# Patient Record
Sex: MALE | Race: WHITE | NOT HISPANIC OR LATINO
[De-identification: names, ages, dates, MRNs, and addresses within clinical notes are randomized per-mention and may not be internally consistent; named-entity substitution may affect disease eponyms.]

---

## 2017-07-18 ENCOUNTER — TRANSCRIPTION ENCOUNTER (OUTPATIENT)
Age: 62
End: 2017-07-18

## 2018-06-18 ENCOUNTER — APPOINTMENT (OUTPATIENT)
Dept: RADIATION ONCOLOGY | Facility: CLINIC | Age: 63
End: 2018-06-18
Payer: COMMERCIAL

## 2018-06-18 VITALS
WEIGHT: 156.19 LBS | HEIGHT: 67 IN | BODY MASS INDEX: 24.52 KG/M2 | HEART RATE: 57 BPM | TEMPERATURE: 98.2 F | DIASTOLIC BLOOD PRESSURE: 67 MMHG | RESPIRATION RATE: 16 BRPM | OXYGEN SATURATION: 97 % | SYSTOLIC BLOOD PRESSURE: 114 MMHG

## 2018-06-18 PROCEDURE — 99213 OFFICE O/P EST LOW 20 MIN: CPT

## 2020-02-09 ENCOUNTER — INPATIENT (INPATIENT)
Facility: HOSPITAL | Age: 65
LOS: 0 days | Discharge: HOME | DRG: 312 | End: 2020-02-10
Attending: HOSPITALIST | Admitting: FAMILY MEDICINE
Payer: MEDICARE

## 2020-02-09 VITALS — HEIGHT: 67 IN | WEIGHT: 154.98 LBS

## 2020-02-09 DIAGNOSIS — Z85.810 PERSONAL HISTORY OF MALIGNANT NEOPLASM OF TONGUE: ICD-10-CM

## 2020-02-09 DIAGNOSIS — R55 SYNCOPE AND COLLAPSE: ICD-10-CM

## 2020-02-09 DIAGNOSIS — Z87.891 PERSONAL HISTORY OF NICOTINE DEPENDENCE: ICD-10-CM
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
## 2020-02-09 DIAGNOSIS — S61.012A LACERATION WITHOUT FOREIGN BODY OF LEFT THUMB WITHOUT DAMAGE TO NAIL, INITIAL ENCOUNTER: ICD-10-CM

## 2020-02-09 DIAGNOSIS — R00.1 BRADYCARDIA, UNSPECIFIED: ICD-10-CM

## 2020-02-09 DIAGNOSIS — E78.5 HYPERLIPIDEMIA, UNSPECIFIED: ICD-10-CM
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
## 2020-02-09 DIAGNOSIS — R73.03 PREDIABETES: ICD-10-CM

## 2020-02-09 DIAGNOSIS — E03.9 HYPOTHYROIDISM, UNSPECIFIED: ICD-10-CM

## 2020-02-09 LAB
ALBUMIN SERPL ELPH-MCNC: 3.7 G/DL — SIGNIFICANT CHANGE UP (ref 3.3–5)
ALP SERPL-CCNC: 88 U/L — SIGNIFICANT CHANGE UP (ref 40–120)
ALT FLD-CCNC: 20 U/L — SIGNIFICANT CHANGE UP (ref 12–78)
ANION GAP SERPL CALC-SCNC: 4 MMOL/L — LOW (ref 5–17)
APTT BLD: 28.3 SEC — SIGNIFICANT CHANGE UP (ref 27.5–36.3)
AST SERPL-CCNC: 15 U/L — SIGNIFICANT CHANGE UP (ref 15–37)
BASOPHILS # BLD AUTO: 0.03 K/UL — SIGNIFICANT CHANGE UP (ref 0–0.2)
BASOPHILS NFR BLD AUTO: 0.4 % — SIGNIFICANT CHANGE UP (ref 0–2)
BILIRUB SERPL-MCNC: 0.3 MG/DL — SIGNIFICANT CHANGE UP (ref 0.2–1.2)
BUN SERPL-MCNC: 11 MG/DL — SIGNIFICANT CHANGE UP (ref 7–23)
CALCIUM SERPL-MCNC: 8.9 MG/DL — SIGNIFICANT CHANGE UP (ref 8.5–10.1)
CHLORIDE SERPL-SCNC: 107 MMOL/L — SIGNIFICANT CHANGE UP (ref 96–108)
CO2 SERPL-SCNC: 29 MMOL/L — SIGNIFICANT CHANGE UP (ref 22–31)
CREAT SERPL-MCNC: 1.12 MG/DL — SIGNIFICANT CHANGE UP (ref 0.5–1.3)
EOSINOPHIL # BLD AUTO: 0.21 K/UL — SIGNIFICANT CHANGE UP (ref 0–0.5)
EOSINOPHIL NFR BLD AUTO: 2.7 % — SIGNIFICANT CHANGE UP (ref 0–6)
GLUCOSE SERPL-MCNC: 95 MG/DL — SIGNIFICANT CHANGE UP (ref 70–99)
HCT VFR BLD CALC: 41.1 % — SIGNIFICANT CHANGE UP (ref 39–50)
HGB BLD-MCNC: 14.4 G/DL — SIGNIFICANT CHANGE UP (ref 13–17)
IMM GRANULOCYTES NFR BLD AUTO: 0.4 % — SIGNIFICANT CHANGE UP (ref 0–1.5)
INR BLD: 1.04 RATIO — SIGNIFICANT CHANGE UP (ref 0.88–1.16)
LYMPHOCYTES # BLD AUTO: 0.96 K/UL — LOW (ref 1–3.3)
LYMPHOCYTES # BLD AUTO: 12.3 % — LOW (ref 13–44)
MAGNESIUM SERPL-MCNC: 1.9 MG/DL — SIGNIFICANT CHANGE UP (ref 1.6–2.6)
MCHC RBC-ENTMCNC: 32.1 PG — SIGNIFICANT CHANGE UP (ref 27–34)
MCHC RBC-ENTMCNC: 35 GM/DL — SIGNIFICANT CHANGE UP (ref 32–36)
MCV RBC AUTO: 91.5 FL — SIGNIFICANT CHANGE UP (ref 80–100)
MONOCYTES # BLD AUTO: 0.77 K/UL — SIGNIFICANT CHANGE UP (ref 0–0.9)
MONOCYTES NFR BLD AUTO: 9.8 % — SIGNIFICANT CHANGE UP (ref 2–14)
NEUTROPHILS # BLD AUTO: 5.82 K/UL — SIGNIFICANT CHANGE UP (ref 1.8–7.4)
NEUTROPHILS NFR BLD AUTO: 74.4 % — SIGNIFICANT CHANGE UP (ref 43–77)
PLATELET # BLD AUTO: 251 K/UL — SIGNIFICANT CHANGE UP (ref 150–400)
POTASSIUM SERPL-MCNC: 3.9 MMOL/L — SIGNIFICANT CHANGE UP (ref 3.5–5.3)
POTASSIUM SERPL-SCNC: 3.9 MMOL/L — SIGNIFICANT CHANGE UP (ref 3.5–5.3)
PROT SERPL-MCNC: 6.7 GM/DL — SIGNIFICANT CHANGE UP (ref 6–8.3)
PROTHROM AB SERPL-ACNC: 11.6 SEC — SIGNIFICANT CHANGE UP (ref 10–12.9)
RBC # BLD: 4.49 M/UL — SIGNIFICANT CHANGE UP (ref 4.2–5.8)
RBC # FLD: 11.7 % — SIGNIFICANT CHANGE UP (ref 10.3–14.5)
SODIUM SERPL-SCNC: 140 MMOL/L — SIGNIFICANT CHANGE UP (ref 135–145)
TROPONIN I SERPL-MCNC: <0.015 NG/ML — SIGNIFICANT CHANGE UP (ref 0.01–0.04)
TROPONIN I SERPL-MCNC: <0.015 NG/ML — SIGNIFICANT CHANGE UP (ref 0.01–0.04)
TSH SERPL-MCNC: 10.1 UU/ML — HIGH (ref 0.34–4.82)
WBC # BLD: 7.82 K/UL — SIGNIFICANT CHANGE UP (ref 3.8–10.5)
WBC # FLD AUTO: 7.82 K/UL — SIGNIFICANT CHANGE UP (ref 3.8–10.5)

## 2020-02-09 PROCEDURE — 84443 ASSAY THYROID STIM HORMONE: CPT

## 2020-02-09 PROCEDURE — 85730 THROMBOPLASTIN TIME PARTIAL: CPT

## 2020-02-09 PROCEDURE — 85027 COMPLETE CBC AUTOMATED: CPT

## 2020-02-09 PROCEDURE — 93005 ELECTROCARDIOGRAM TRACING: CPT

## 2020-02-09 PROCEDURE — 84436 ASSAY OF TOTAL THYROXINE: CPT

## 2020-02-09 PROCEDURE — 84481 FREE ASSAY (FT-3): CPT

## 2020-02-09 PROCEDURE — 82962 GLUCOSE BLOOD TEST: CPT

## 2020-02-09 PROCEDURE — 84484 ASSAY OF TROPONIN QUANT: CPT | Mod: 91

## 2020-02-09 PROCEDURE — 99285 EMERGENCY DEPT VISIT HI MDM: CPT | Mod: 25

## 2020-02-09 PROCEDURE — 84100 ASSAY OF PHOSPHORUS: CPT

## 2020-02-09 PROCEDURE — 86618 LYME DISEASE ANTIBODY: CPT

## 2020-02-09 PROCEDURE — 83735 ASSAY OF MAGNESIUM: CPT

## 2020-02-09 PROCEDURE — 80061 LIPID PANEL: CPT

## 2020-02-09 PROCEDURE — 83036 HEMOGLOBIN GLYCOSYLATED A1C: CPT

## 2020-02-09 PROCEDURE — 85610 PROTHROMBIN TIME: CPT

## 2020-02-09 PROCEDURE — 80048 BASIC METABOLIC PNL TOTAL CA: CPT

## 2020-02-09 PROCEDURE — 85025 COMPLETE CBC W/AUTO DIFF WBC: CPT

## 2020-02-09 PROCEDURE — 80053 COMPREHEN METABOLIC PANEL: CPT

## 2020-02-09 PROCEDURE — 36415 COLL VENOUS BLD VENIPUNCTURE: CPT

## 2020-02-09 PROCEDURE — 86803 HEPATITIS C AB TEST: CPT

## 2020-02-09 PROCEDURE — 71045 X-RAY EXAM CHEST 1 VIEW: CPT

## 2020-02-09 PROCEDURE — 93306 TTE W/DOPPLER COMPLETE: CPT

## 2020-02-09 RX ORDER — LEVOTHYROXINE SODIUM 125 MCG
25 TABLET ORAL DAILY
Refills: 0 | Status: DISCONTINUED | OUTPATIENT
Start: 2020-02-09 | End: 2020-02-10

## 2020-02-09 RX ORDER — LACTOBACILLUS ACIDOPHILUS 100MM CELL
1 CAPSULE ORAL
Refills: 0 | Status: DISCONTINUED | OUTPATIENT
Start: 2020-02-09 | End: 2020-02-10

## 2020-02-09 RX ORDER — ATORVASTATIN CALCIUM 80 MG/1
80 TABLET, FILM COATED ORAL AT BEDTIME
Refills: 0 | Status: DISCONTINUED | OUTPATIENT
Start: 2020-02-09 | End: 2020-02-10

## 2020-02-09 RX ORDER — ACETAMINOPHEN 500 MG
650 TABLET ORAL EVERY 6 HOURS
Refills: 0 | Status: DISCONTINUED | OUTPATIENT
Start: 2020-02-09 | End: 2020-02-10

## 2020-02-09 RX ORDER — SODIUM CHLORIDE 9 MG/ML
1000 INJECTION, SOLUTION INTRAVENOUS
Refills: 0 | Status: COMPLETED | OUTPATIENT
Start: 2020-02-09 | End: 2020-02-09

## 2020-02-09 RX ORDER — CEPHALEXIN 500 MG
500 CAPSULE ORAL
Refills: 0 | Status: DISCONTINUED | OUTPATIENT
Start: 2020-02-09 | End: 2020-02-10

## 2020-02-09 RX ORDER — ACETAMINOPHEN 500 MG
650 TABLET ORAL ONCE
Refills: 0 | Status: DISCONTINUED | OUTPATIENT
Start: 2020-02-09 | End: 2020-02-10

## 2020-02-09 RX ADMIN — Medication 500 MILLIGRAM(S): at 16:49

## 2020-02-09 RX ADMIN — Medication 500 MILLIGRAM(S): at 23:26

## 2020-02-09 RX ADMIN — SODIUM CHLORIDE 125 MILLILITER(S): 9 INJECTION, SOLUTION INTRAVENOUS at 21:48

## 2020-02-09 RX ADMIN — Medication 1 TABLET(S): at 23:26

## 2020-02-09 RX ADMIN — ATORVASTATIN CALCIUM 80 MILLIGRAM(S): 80 TABLET, FILM COATED ORAL at 21:48

## 2020-02-09 NOTE — H&P ADULT - ASSESSMENT
66 yo male with a pmh/o HLD, former smoker, s/p tongue cancer, hypothyroidism, finger laceration, preDM, admitted for:    Near syncope in setting of bradycardia, trauma  -likely vasovagal in etiology, as pt with laceration to finger just prior to sx. with associated severe pain, diaphoresis, and in ED pt with asymptomatic bradycardia as noted on EKG with sinus meche ranging in mid 40's without other abnormality s/p anesthesia admin for lac repair, now sustaining in 60-70's on monitor  -Pt is an athlete, long term h/o gymnastics  -Pt did not lose consciousness as per him, wife states he was wide eyed for one second?, questionable LOC, pt denies  -HIE review shows sinus bradycardia since 2016  -TTE ordered  -i/o's  -orthostatics  -trend trop, neg x 1  -lyme titer sent by ED  -fall precautions  -cardiology consult  -f/u AM cbc, bmp, coags, mg, phos    Laceration to finger s/p trauma  -wound care consult  -cont ppx abx + bacid  -pain control  -similar episode in 2016 to R 2nd digit as per HIE, seen at     Hypothyroidism  -Just diagnosed as outpatient, TSH at that time 9  -f/u with pmd  -just started on synthroid 25mcg daily    PreDM-denies h/o  -HbA1c (HIE 2016) 6.1, f/u rpt   -f/u rpt, consider carb restriction pending result    HLD  -total cholesterol (HIE 2016) 285   -cont rosuvastatin 20 mg po qd, just started by PMD  -DASH/TLC diet  -diet education, pt eating take out on interview  -f/u lipid panel

## 2020-02-09 NOTE — ED STATDOCS - OBJECTIVE STATEMENT
64 y/o male with PMHx of tongue CA presents to the ED c/o L hand thumb laceration with assoc. pain. Pt was working with wood and sliced his thumb, sustaining lac. Denies numbness, tingling. Pt also with vasovagal syncopal episode after seeing injury. No other acute complaints or injury at this time. Tetanus UTD.

## 2020-02-09 NOTE — ED ADULT TRIAGE NOTE - CHIEF COMPLAINT QUOTE
Patient comes in with laceration to left thumb with razor blade. Patient states he was wood cutting when injury happened. Bleeding controlled at triage. Patient wrapped thumb to control bleeding. Patient states near syncopal episode after seeing injury on his thumb.

## 2020-02-09 NOTE — ED STATDOCS - CARE PLAN
Principal Discharge DX:	Symptomatic bradycardia  Secondary Diagnosis:	Syncope and collapse  Secondary Diagnosis:	Laceration of left thumb without foreign body without damage to nail, initial encounter  Secondary Diagnosis:	Hypothyroidism, unspecified type

## 2020-02-09 NOTE — H&P ADULT - ATTENDING COMMENTS
16 min spent discussing advanced care planning with patient and wife. Pt is full code and accepts CPR and intubation if indicated with his wife to act as proxy should he be unable to make decisions.

## 2020-02-09 NOTE — H&P ADULT - NSICDXFAMILYHX_GEN_ALL_CORE_FT
FAMILY HISTORY:  Maternal family history of cancer, mother  from cancer  No family history of diabetes mellitus, or syncope

## 2020-02-09 NOTE — ED STATDOCS - PROGRESS NOTE DETAILS
64 y/o M with PMH of tongue CA presents with left thumb laceration. Pt states he was working with wood and accidentally cut his thumb with a razor. tetanus is up to date. Wife at bedside states patient had syncopal episode after seeing finger. No other noted injuries. Denies fever, chills, nausea, vomiting, head trauma, CP, SOB. PE: Well appearing. Cardiac: s1s2, bradycardic. Lungs: CTAB. Abdomen: NBS x4, soft, nontender. MSK: 2.5cm V-shaped laceration to dorsal aspect left thumb with slow bleeding. Sensation intact to light touch. Full ROM in thumb. 5/5 UE strength. A/P: 1) Laceration: plan for repair, antibiotics. 2) Bradycardia/syncope. Plan for EKG, reassess. - Clement Booth PA-C EKG prior to lac repair, sinus bradycardia rate of 45. Pt placed on pulse-ox prior to repair, HR ranged 34-53 during repair, primarily staying 40-45bpm. Pt reported feeling lightheaded when HR <40 bpm. states baseline HR in 70s. Denies beta-blocker use, accidental consumption of medication. Will assess labs, CXR, cardiac monitor. Likely admission for symptomatic bradycardia. - Clement Booth PA-C

## 2020-02-09 NOTE — PATIENT PROFILE ADULT - NSPROMUTANXFEARADDRESSFT_GEN_A_NUR
Have You Had Laser Hair Removal Before?: has had previous treatment Number Of Treatments: 2 Keep pt informed

## 2020-02-09 NOTE — ED ADULT NURSE NOTE - OBJECTIVE STATEMENT
Patient presents to ED complaining of laceration. Patient complaining of laceration to L thumb. Patient cut thumb on razor blade, became dizzy diaphoretic, near syncope. Wife witnessed, -LOC, patient did not fall or hit head. Patient denies CP, SOB, dizziness, HA, blurred vision.

## 2020-02-09 NOTE — ED STATDOCS - SECONDARY DIAGNOSIS.
Syncope and collapse Laceration of left thumb without foreign body without damage to nail, initial encounter Hypothyroidism, unspecified type

## 2020-02-09 NOTE — ED STATDOCS - ATTENDING CONTRIBUTION TO CARE
I, Terri Mehta DO,  performed the initial face to face bedside interview with this patient regarding history of present illness, review of symptoms and relevant past medical, social and family history.  I completed an independent physical examination.  I was the initial provider who evaluated this patient. I have signed out the follow up of any pending tests (i.e. labs, radiological studies) to the ACP.  I have communicated the patient’s plan of care and disposition with the ACP.  The history, relevant review of systems, past medical and surgical history, medical decision making, and physical examination was documented by the scribe in my presence and I attest to the accuracy of the documentation.

## 2020-02-09 NOTE — H&P ADULT - HISTORY OF PRESENT ILLNESS
Pt is a 64 yo male with a pmh/o HLD, former smoker, s/p tongue cancer, hypothyroidism, finger laceration, preDM?, who presents to ED after sustaining a laceration to his finger at home and 'almost passing out'. Wife at bedside states she noticed him to be sweaty after cutting his finger and looked glassy eyed for about a sec. before coming back to. Pt states during this time he could see and hear her talking but did not know what was happening and was describes seeing white light shining during that second before regaining mentation. Pt did not fall, did not collapse, did not hit head, no parasthesias, shaking, incontinence, cp, palpitations, n/v/d. In ED finger laceration repaired and pt noted to have sinus bradycardia on EKG on avg. 40's while at rest and on monitor anywhere from 50-60bpm. Upon chart review pt has a h/o sinus bradycardia, in HIE two  visits note HR  HR 54 in 2017, 49 in 2016. Of note, pt is an athlete, long time gymnast.

## 2020-02-10 ENCOUNTER — TRANSCRIPTION ENCOUNTER (OUTPATIENT)
Age: 65
End: 2020-02-10

## 2020-02-10 VITALS
HEART RATE: 53 BPM | OXYGEN SATURATION: 97 % | SYSTOLIC BLOOD PRESSURE: 113 MMHG | RESPIRATION RATE: 18 BRPM | TEMPERATURE: 98 F | DIASTOLIC BLOOD PRESSURE: 70 MMHG

## 2020-02-10 LAB
ANION GAP SERPL CALC-SCNC: 5 MMOL/L — SIGNIFICANT CHANGE UP (ref 5–17)
APTT BLD: 30.4 SEC — SIGNIFICANT CHANGE UP (ref 27.5–36.3)
B BURGDOR C6 AB SER-ACNC: NEGATIVE — SIGNIFICANT CHANGE UP
B BURGDOR IGG+IGM SER-ACNC: 0.08 INDEX — SIGNIFICANT CHANGE UP (ref 0.01–0.89)
BUN SERPL-MCNC: 12 MG/DL — SIGNIFICANT CHANGE UP (ref 7–23)
CALCIUM SERPL-MCNC: 8.8 MG/DL — SIGNIFICANT CHANGE UP (ref 8.5–10.1)
CHLORIDE SERPL-SCNC: 112 MMOL/L — HIGH (ref 96–108)
CHOLEST SERPL-MCNC: 201 MG/DL — HIGH (ref 10–199)
CO2 SERPL-SCNC: 26 MMOL/L — SIGNIFICANT CHANGE UP (ref 22–31)
CREAT SERPL-MCNC: 0.95 MG/DL — SIGNIFICANT CHANGE UP (ref 0.5–1.3)
GLUCOSE SERPL-MCNC: 104 MG/DL — HIGH (ref 70–99)
HBA1C BLD-MCNC: 6 % — HIGH (ref 4–5.6)
HCT VFR BLD CALC: 40.1 % — SIGNIFICANT CHANGE UP (ref 39–50)
HCV AB S/CO SERPL IA: 0.16 S/CO — SIGNIFICANT CHANGE UP (ref 0–0.99)
HCV AB SERPL-IMP: SIGNIFICANT CHANGE UP
HDLC SERPL-MCNC: 46 MG/DL — SIGNIFICANT CHANGE UP
HGB BLD-MCNC: 13.8 G/DL — SIGNIFICANT CHANGE UP (ref 13–17)
INR BLD: 1.07 RATIO — SIGNIFICANT CHANGE UP (ref 0.88–1.16)
LIPID PNL WITH DIRECT LDL SERPL: 124 MG/DL — HIGH
MAGNESIUM SERPL-MCNC: 2 MG/DL — SIGNIFICANT CHANGE UP (ref 1.6–2.6)
MCHC RBC-ENTMCNC: 31.4 PG — SIGNIFICANT CHANGE UP (ref 27–34)
MCHC RBC-ENTMCNC: 34.4 GM/DL — SIGNIFICANT CHANGE UP (ref 32–36)
MCV RBC AUTO: 91.3 FL — SIGNIFICANT CHANGE UP (ref 80–100)
PHOSPHATE SERPL-MCNC: 2.9 MG/DL — SIGNIFICANT CHANGE UP (ref 2.5–4.5)
PLATELET # BLD AUTO: 236 K/UL — SIGNIFICANT CHANGE UP (ref 150–400)
POTASSIUM SERPL-MCNC: 3.8 MMOL/L — SIGNIFICANT CHANGE UP (ref 3.5–5.3)
POTASSIUM SERPL-SCNC: 3.8 MMOL/L — SIGNIFICANT CHANGE UP (ref 3.5–5.3)
PROTHROM AB SERPL-ACNC: 11.9 SEC — SIGNIFICANT CHANGE UP (ref 10–12.9)
RBC # BLD: 4.39 M/UL — SIGNIFICANT CHANGE UP (ref 4.2–5.8)
RBC # FLD: 11.8 % — SIGNIFICANT CHANGE UP (ref 10.3–14.5)
SODIUM SERPL-SCNC: 143 MMOL/L — SIGNIFICANT CHANGE UP (ref 135–145)
T3FREE SERPL-MCNC: 2.52 PG/ML — SIGNIFICANT CHANGE UP (ref 1.8–4.6)
T4 AB SER-ACNC: 3.8 UG/DL — LOW (ref 4.6–12)
TOTAL CHOLESTEROL/HDL RATIO MEASUREMENT: 4.4 RATIO — SIGNIFICANT CHANGE UP (ref 3.4–9.6)
TRIGL SERPL-MCNC: 155 MG/DL — HIGH (ref 10–149)
TROPONIN I SERPL-MCNC: <0.015 NG/ML — SIGNIFICANT CHANGE UP (ref 0.01–0.04)
WBC # BLD: 7.37 K/UL — SIGNIFICANT CHANGE UP (ref 3.8–10.5)
WBC # FLD AUTO: 7.37 K/UL — SIGNIFICANT CHANGE UP (ref 3.8–10.5)

## 2020-02-10 RX ORDER — LEVOTHYROXINE SODIUM 125 MCG
50 TABLET ORAL DAILY
Refills: 0 | Status: DISCONTINUED | OUTPATIENT
Start: 2020-02-10 | End: 2020-02-10

## 2020-02-10 RX ORDER — LEVOTHYROXINE SODIUM 125 MCG
1 TABLET ORAL
Qty: 30 | Refills: 0
Start: 2020-02-10 | End: 2020-03-10

## 2020-02-10 RX ORDER — CEPHALEXIN 500 MG
1 CAPSULE ORAL
Qty: 20 | Refills: 0
Start: 2020-02-10 | End: 2020-02-14

## 2020-02-10 RX ORDER — LEVOTHYROXINE SODIUM 125 MCG
25 TABLET ORAL DAILY
Refills: 0 | Status: DISCONTINUED | OUTPATIENT
Start: 2020-02-10 | End: 2020-02-10

## 2020-02-10 RX ADMIN — Medication 500 MILLIGRAM(S): at 11:58

## 2020-02-10 RX ADMIN — Medication 25 MICROGRAM(S): at 05:25

## 2020-02-10 RX ADMIN — Medication 1 TABLET(S): at 05:25

## 2020-02-10 RX ADMIN — Medication 500 MILLIGRAM(S): at 05:25

## 2020-02-10 NOTE — DISCHARGE NOTE PROVIDER - HOSPITAL COURSE
66 yo male with a PMHx of HLD, former smoker, s/p tongue cancer, hypothyroidism, who presents to ED after sustaining a laceration to his finger at home and 'almost passing out'. Wife at bedside states she noticed him to be sweaty after cutting his finger and looked glassy eyed for about a sec. before coming back to. Patient was admitted to [facility] for near syncopal episode. Patient was admitted to telemetry unit overnight as EKG showed sinus bradycardia. Patient was evaluated by cardiology, TTE was performed. Patient was recently diagnosed with hypothyroidism and was prescribed 25mcg Levothyroxine but patient admits that he hasn't picked up his prescription. Prescription was sent. Laceration of the left thumb was repaired in ED, requiring 7 stiches. Patient was prescribed Keflex QID x5 days. Rest of the hospital course was unremarkable. Patient should follow-up with PCP soon after discharge     At this time, patient is medically optimized to be discharged home. 66 yo male with a PMHx of HLD, former smoker, s/p tongue cancer, hypothyroidism, who presents to ED after sustaining a laceration to his finger at home and 'almost passing out'. Wife at bedside states she noticed him to be sweaty after cutting his finger and looked glassy eyed for about a sec. before coming back to. Patient was admitted to [facility] for near syncopal episode. Patient was admitted to telemetry unit overnight as EKG showed sinus bradycardia. Patient was evaluated by cardiology, TTE was performed. Lyme panel was negative. Patient was recently diagnosed with hypothyroidism and was prescribed 25mcg Levothyroxine but patient admits that he hasn't picked up his prescription. Prescription was resent. Laceration of the left thumb was repaired in ED, requiring 7 stiches. Patient was prescribed Keflex QID x5 days. Rest of the hospital course was unremarkable. Patient should follow-up with PCP soon after discharge     At this time, patient is medically optimized to be discharged home.         SUBJECTIVE: Pt seen and evaluated at bedside. No acute events overnight. No headache, dizziness, chest pain, palpitations. Pain well controlled         REVIEW OF SYSTEMS:    CONSTITUTIONAL: No weakness, fevers or chills    EYES/ENT: No visual changes;  No vertigo or throat pain     NECK: No pain or stiffness    RESPIRATORY: No cough, wheezing, hemoptysis; No shortness of breath    CARDIOVASCULAR: No chest pain or palpitations    GASTROINTESTINAL: No abdominal or epigastric pain. No nausea, vomiting, or hematemesis; No diarrhea or constipation. No melena or hematochezia.    GENITOURINARY: No dysuria, frequency or hematuria    NEUROLOGICAL: No numbness or weakness    SKIN: No itching, burning, rashes, or lesions     All other review of systems is negative unless indicated above        Vital Signs Last 24 Hrs    T(C): 36.7 (10 Feb 2020 11:11), Max: 37 (09 Feb 2020 16:25)    T(F): 98.1 (10 Feb 2020 11:11), Max: 98.6 (09 Feb 2020 16:25)    HR: 53 (10 Feb 2020 11:11) (48 - 73)    BP: 113/70 (10 Feb 2020 11:11) (106/69 - 113/78) 66 yo male with a PMHx of HLD, former smoker, s/p tongue cancer, hypothyroidism, who presents to ED after sustaining a laceration to his finger at home and 'almost passing out'. Wife at bedside states she noticed him to be sweaty after cutting his finger and looked glassy eyed for about a sec. before coming back to. Patient was admitted to [facility] for near syncopal episode. Patient was admitted to telemetry unit overnight as EKG showed sinus bradycardia. Patient was evaluated by cardiology, TTE was performed. Lyme panel was negative. Patient was recently diagnosed with hypothyroidism and was prescribed 25mcg Levothyroxine but patient admits that he hasn't picked up his prescription. Prescription was resent. Laceration of the left thumb was repaired in ED, requiring 7 stiches. Patient was prescribed Keflex QID x5 days. Rest of the hospital course was unremarkable. Patient should follow-up with PCP soon after discharge     At this time, patient is medically optimized to be discharged home.         SUBJECTIVE: Pt seen and evaluated at bedside. No acute events overnight. No headache, dizziness, chest pain, palpitations. Pain well controlled         REVIEW OF SYSTEMS:    CONSTITUTIONAL: No weakness, fevers or chills    EYES/ENT: No visual changes;  No vertigo or throat pain     NECK: No pain or stiffness    RESPIRATORY: No cough, wheezing, hemoptysis; No shortness of breath    CARDIOVASCULAR: No chest pain or palpitations    GASTROINTESTINAL: No abdominal or epigastric pain. No nausea, vomiting, or hematemesis; No diarrhea or constipation. No melena or hematochezia.    GENITOURINARY: No dysuria, frequency or hematuria    NEUROLOGICAL: No numbness or weakness    SKIN: No itching, burning, rashes, or lesions     All other review of systems is negative unless indicated above        Vital Signs Last 24 Hrs    T(C): 36.7 (10 Feb 2020 11:11), Max: 37 (09 Feb 2020 16:25)    T(F): 98.1 (10 Feb 2020 11:11), Max: 98.6 (09 Feb 2020 16:25)    HR: 53 (10 Feb 2020 11:11) (48 - 73)    BP: 113/70 (10 Feb 2020 11:11) (106/69 - 113/78)        PHYSICAL EXAM:    Constitutional: NAD, awake and alert, well-developed    HEENT: PERR, EOMI, Normal Hearing, MMM    Neck: Soft and supple, No LAD, No JVD    Respiratory: Breath sounds are clear bilaterally, No wheezing, rales or rhonchi    Cardiovascular: S1 and S2, +bradycardic    Gastrointestinal: Bowel Sounds present, soft, nontender, nondistended, no guarding, no rebound    Extremities: No peripheral edema, LEFT HAND: +sutures in place, secured in dressing, dressing c/d/i, FROM, NV intact distally     Vascular: 2+ peripheral pulses    Neurological: A/O x 3, no focal deficits    Musculoskeletal: 5/5 strength b/l upper and lower extremities    Skin: No rashes            < from: Xray Chest 1 View- PORTABLE-Urgent (02.09.20 @ 16:41) >        EXAM:  XR CHEST PORTABLE URGENT 1V                                  PROCEDURE DATE:  02/09/2020                  INTERPRETATION:  Chest one view        HISTORY: Bradycardia        COMPARISON STUDY: None available        Frontal expiratory view of the chest shows the heart to be normal in size. The lungs are clear and there is no evidence of pneumothorax nor pleural effusion.        IMPRESSION:    No active pulmonary disease.        Thank you for the courtesy of this referral.        < end of copied text > 66 yo male with a PMHx of HLD, former smoker, s/p tongue cancer, hypothyroidism, who presents to ED after sustaining a laceration to his finger at home and 'almost passing out'. Wife at bedside states she noticed him to be sweaty after cutting his finger and looked glassy eyed for about a sec. before coming back to. Patient was admitted to [facility] for near syncopal episode. Patient was admitted to telemetry unit overnight as EKG showed sinus bradycardia. Troponins x3 were negative. Patient was evaluated by cardiology, TTE was performed. Orthostatic and Lyme panel was negative. Patient was recently diagnosed with hypothyroidism and was prescribed 25mcg Levothyroxine but patient admits that he hasn't picked up his prescription. Prescription was resent. Laceration of the left thumb was repaired in ED, requiring 7 stiches. Patient was prescribed Keflex QID x5 days. Rest of the hospital course was unremarkable. Patient should follow-up with PCP soon after discharge     At this time, patient is medically optimized to be discharged home.         SUBJECTIVE: Pt seen and evaluated at bedside. No acute events overnight. No headache, dizziness, chest pain, palpitations. Pain well controlled         REVIEW OF SYSTEMS:    CONSTITUTIONAL: No weakness, fevers or chills    EYES/ENT: No visual changes;  No vertigo or throat pain     NECK: No pain or stiffness    RESPIRATORY: No cough, wheezing, hemoptysis; No shortness of breath    CARDIOVASCULAR: No chest pain or palpitations    GASTROINTESTINAL: No abdominal or epigastric pain. No nausea, vomiting, or hematemesis; No diarrhea or constipation. No melena or hematochezia.    GENITOURINARY: No dysuria, frequency or hematuria    NEUROLOGICAL: No numbness or weakness    SKIN: No itching, burning, rashes, or lesions     All other review of systems is negative unless indicated above        Vital Signs Last 24 Hrs    T(C): 36.7 (10 Feb 2020 11:11), Max: 37 (09 Feb 2020 16:25)    T(F): 98.1 (10 Feb 2020 11:11), Max: 98.6 (09 Feb 2020 16:25)    HR: 53 (10 Feb 2020 11:11) (48 - 73)    BP: 113/70 (10 Feb 2020 11:11) (106/69 - 113/78)        PHYSICAL EXAM:    Constitutional: NAD, awake and alert, well-developed    HEENT: PERR, EOMI, Normal Hearing, MMM    Neck: Soft and supple, No LAD, No JVD    Respiratory: Breath sounds are clear bilaterally, No wheezing, rales or rhonchi    Cardiovascular: S1 and S2, +bradycardic    Gastrointestinal: Bowel Sounds present, soft, nontender, nondistended, no guarding, no rebound    Extremities: No peripheral edema, LEFT HAND: +sutures in place, secured in dressing, dressing c/d/i, FROM, NV intact distally     Vascular: 2+ peripheral pulses    Neurological: A/O x 3, no focal deficits    Musculoskeletal: 5/5 strength b/l upper and lower extremities    Skin: No rashes            < from: Xray Chest 1 View- PORTABLE-Urgent (02.09.20 @ 16:41) >        EXAM:  XR CHEST PORTABLE URGENT 1V                                  PROCEDURE DATE:  02/09/2020                  INTERPRETATION:  Chest one view        HISTORY: Bradycardia        COMPARISON STUDY: None available        Frontal expiratory view of the chest shows the heart to be normal in size. The lungs are clear and there is no evidence of pneumothorax nor pleural effusion.        IMPRESSION:    No active pulmonary disease.        Thank you for the courtesy of this referral.        < end of copied text > 66 yo male with a PMHx of HLD, former smoker, s/p tongue cancer, hypothyroidism, who presents to ED after sustaining a laceration to his finger at home and 'almost passing out'. Wife at bedside states she noticed him to be sweaty after cutting his finger and looked glassy eyed for about a sec. before coming back to. Patient was admitted to [facility] for near syncopal episode. Patient was admitted to telemetry unit overnight as EKG showed sinus bradycardia. Troponins x3 were negative. Patient was evaluated by cardiology, TTE was performed. Orthostatic and Lyme panel was negative. Patient was recently diagnosed with hypothyroidism and was prescribed 25mcg Levothyroxine but patient admits that he hasn't picked up his prescription. Prescription was resent. Laceration of the left thumb was repaired in ED, requiring 7 stiches. Patient was prescribed Keflex QID x5 days. Rest of the hospital course was unremarkable. Patient should follow-up with PCP soon after discharge     At this time, patient is medically optimized to be discharged home.         Vital Signs Last 24 Hrs    T(C): 36.7 (10 Feb 2020 11:11), Max: 37 (09 Feb 2020 16:25)    T(F): 98.1 (10 Feb 2020 11:11), Max: 98.6 (09 Feb 2020 16:25)    HR: 53 (10 Feb 2020 11:11) (48 - 73)    BP: 113/70 (10 Feb 2020 11:11) (106/69 - 113/78)        PHYSICAL EXAM:    Constitutional: NAD, awake and alert, well-developed    HEENT: PERR, EOMI, Normal Hearing, MMM    Neck: Soft and supple, No LAD, No JVD    Respiratory: Breath sounds are clear bilaterally, No wheezing, rales or rhonchi    Cardiovascular: S1 and S2, +bradycardic    Gastrointestinal: Bowel Sounds present, soft, nontender, nondistended, no guarding, no rebound    Extremities: No peripheral edema, LEFT HAND: +sutures in place, secured in dressing, dressing c/d/i, FROM, NV intact distally     Vascular: 2+ peripheral pulses    Neurological: A/O x 3, no focal deficits    Musculoskeletal: 5/5 strength b/l upper and lower extremities    Skin: Lacerations dressed on R 2nd finger            < from: Xray Chest 1 View- PORTABLE-Urgent (02.09.20 @ 16:41) >        EXAM:  XR CHEST PORTABLE URGENT 1V                                  PROCEDURE DATE:  02/09/2020                  INTERPRETATION:  Chest one view        HISTORY: Bradycardia        COMPARISON STUDY: None available        Frontal expiratory view of the chest shows the heart to be normal in size. The lungs are clear and there is no evidence of pneumothorax nor pleural effusion.        IMPRESSION:    No active pulmonary disease.        Thank you for the courtesy of this referral.        < end of copied text >

## 2020-02-10 NOTE — DISCHARGE NOTE PROVIDER - NSDCCPCAREPLAN_GEN_ALL_CORE_FT
PRINCIPAL DISCHARGE DIAGNOSIS  Diagnosis: Vasovagal near-syncope  Assessment and Plan of Treatment: Syncope is also called fainting or passing out. Syncope is a sudden, temporary loss of consciousness, followed by a fall from a standing or sitting position.   You should follow up with your PCP soon after discharge.      SECONDARY DISCHARGE DIAGNOSES  Diagnosis: Sinus bradycardia  Assessment and Plan of Treatment: Bradycardia is a slow heart rate, usually fewer than 60 beats per minute. A slow heart rate is normal for some people, such as athletes, and needs no treatment. Bradycardia may also be caused by health conditions that do need treatment. Your healthcare provider will tell you what heart rate is too low for you.  Call your doctor or cardiologist if:  You feel lightheaded or faint.  Your pulse rate is lower than healthcare providers say it should be, even with treatment.  You are more tired than usual, even with treatment.  You have questions or concerns about your condition or care.    Diagnosis: Hypothyroidism, unspecified type  Assessment and Plan of Treatment: Hypothyroidism is a condition that develops when the thyroid gland does not make enough thyroid hormone. Thyroid hormones help control body temperature, heart rate, growth, and weight.  You should start taking your Synthroid that was precribed. You should follow up with your PCP to check your thyroid levels       Diagnosis: Laceration of left thumb without foreign body without damage to nail, initial encounter  Assessment and Plan of Treatment: You are discharged on Kelfex (antiobiotic) which you will be taking 4 times a day for 5 days. You should take antibiotics with food and water.   Do not get your wound wet until your healthcare provider says it is okay. Do not soak your wound in water. Do not go swimming until your healthcare provider says it is okay. Carefully wash the wound with soap and water. Gently pat the area dry or allow it to air dry.  Change your bandages when they get wet, dirty, or after washing. Apply new, clean bandages as directed. Do not apply elastic bandages or tape too tight. Do not put powders or lotions over your incision.  Apply antibiotic ointment as directed. Your healthcare provider may give you antibiotic ointment to put over your wound if you have stitches. If you have strips of tape over your incision, let them dry up and fall off on their own. If they do not fall off within 14 days, gently remove them. If you have glue over your wound, do not remove or pick at it. If your glue comes off, do not replace it with glue that you have at home.  Check your wound every day for signs of infection, such as swelling, redness, or pus.  Follow-up with PCP soon after discharge.

## 2020-02-10 NOTE — DISCHARGE NOTE PROVIDER - NSDCMRMEDTOKEN_GEN_ALL_CORE_FT
cephalexin 500 mg oral capsule: 1 cap(s) orally 4 times a day for 5 day course  rosuvastatin 20 mg oral tablet: 1 tab(s) orally once a day  Synthroid 25 mcg (0.025 mg) oral tablet: 1 tab(s) orally once a day

## 2020-02-10 NOTE — ADVANCED PRACTICE NURSE CONSULT - RECOMMEDATIONS
1) Keep left thumb dry and clean.  2) Change dressing daily in hospital; may keep open to air at home  3) Follow-up for suture removal in 10 days

## 2020-02-10 NOTE — PROGRESS NOTE ADULT - SUBJECTIVE AND OBJECTIVE BOX
Pt has been seen and examined with FP resident, resident supervised agree with a/p       Patient is a 65y old  Male who presents with a chief complaint of near syncope (09 Feb 2020 20:07)        HPI:  Pt is a 64 yo male with a pmh/o HLD, former smoker, s/p tongue cancer, hypothyroidism, finger laceration, preDM?, who presents to ED after sustaining a laceration to his finger at home and 'almost passing out'.   PHYSICAL EXAM:  Vital Signs Last 24 Hrs  T(C): 36.6 (10 Feb 2020 05:21), Max: 37 (09 Feb 2020 16:25)  T(F): 97.9 (10 Feb 2020 05:21), Max: 98.6 (09 Feb 2020 16:25)  HR: 48 (10 Feb 2020 05:21) (48 - 73)  BP: 107/56 (10 Feb 2020 05:21) (106/69 - 113/78)  BP(mean): --  RR: 18 (10 Feb 2020 05:21) (17 - 18)  SpO2: 98% (10 Feb 2020 05:21) (97% - 99%)  general- comfortable   -rs-aeeb,cta  -cvs-s1s2 normal   -p/a-soft,bs+  -extremity- no asymmetrical swelling noted   -cns- non focal         A/P    #Possible vasovagal syncope   -if cleared by cardiology team, d/c home today with further management as an outpt, time spent 40 minutes     #elevated TSH noted - increase his synthroid dose - follow up with pmd for further adjustment

## 2020-02-10 NOTE — DISCHARGE NOTE PROVIDER - CARE PROVIDER_API CALL
Kulwant Meraz)  Internal Medicine  [address], [address]  [place] [address]  Phone: [phone]  Fax: [phone]  Follow Up Time:

## 2020-02-10 NOTE — DISCHARGE NOTE NURSING/CASE MANAGEMENT/SOCIAL WORK - PATIENT PORTAL LINK FT
You can access the FollowMyHealth Patient Portal offered by [facility] by registering at the following website: [email or web address]. By joining [employer]’s FollowMyHealth portal, you will also be able to view your health information using other applications (apps) compatible with our system.

## 2020-02-10 NOTE — PROGRESS NOTE ADULT - SUBJECTIVE AND OBJECTIVE BOX
HPI: Pt is a 64 yo male with a pmh/o HLD, former smoker, s/p tongue cancer, hypothyroidism, finger laceration, preDM?, who presents to ED after sustaining a laceration to his finger at home and 'almost passing out'. Wife at bedside states she noticed him to be sweaty after cutting his finger and looked glassy eyed for about a sec. before coming back to. Pt states during this time he could see and hear her talking but did not know what was happening and was describes seeing white light shining during that second before regaining mentation. Pt did not fall, did not collapse, did not hit head, no parasthesias, shaking, incontinence, cp, palpitations, n/v/d. In ED finger laceration repaired and pt noted to have sinus bradycardia on EKG on avg. 40's while at rest and on monitor anywhere from 50-60bpm. Upon chart review pt has a h/o sinus bradycardia, in HIE two UC visits note HR  HR 54 in 2017, 49 in 2016. Of note, pt is an athlete, long time gymnast.    SUBJECTIVE: Pt seen and evaluated at bedside. No acute events overnight. No headache, dizziness, chest pain, palpitations. Pain well controlled     REVIEW OF SYSTEMS:  CONSTITUTIONAL: No weakness, fevers or chills  EYES/ENT: No visual changes;  No vertigo or throat pain   NECK: No pain or stiffness  RESPIRATORY: No cough, wheezing, hemoptysis; No shortness of breath  CARDIOVASCULAR: No chest pain or palpitations  GASTROINTESTINAL: No abdominal or epigastric pain. No nausea, vomiting, or hematemesis; No diarrhea or constipation. No melena or hematochezia.  GENITOURINARY: No dysuria, frequency or hematuria  NEUROLOGICAL: No numbness or weakness  SKIN: No itching, burning, rashes, or lesions   All other review of systems is negative unless indicated above    Vital Signs Last 24 Hrs  T(C): 36.7 (10 Feb 2020 11:11), Max: 37 (09 Feb 2020 16:25)  T(F): 98.1 (10 Feb 2020 11:11), Max: 98.6 (09 Feb 2020 16:25)  HR: 53 (10 Feb 2020 11:11) (48 - 73)  BP: 113/70 (10 Feb 2020 11:11) (106/69 - 113/78)  BP(mean): --  RR: 18 (10 Feb 2020 11:11) (17 - 18)  SpO2: 97% (10 Feb 2020 11:11) (97% - 99%)    I&O's Summary      CAPILLARY BLOOD GLUCOSE      POCT Blood Glucose.: 143 mg/dL (09 Feb 2020 14:59)      PHYSICAL EXAM:  Constitutional: NAD, awake and alert, well-developed  HEENT: PERR, EOMI, Normal Hearing, MMM  Neck: Soft and supple, No LAD, No JVD  Respiratory: Breath sounds are clear bilaterally, No wheezing, rales or rhonchi  Cardiovascular: S1 and S2, regular rate and rhythm, no Murmurs, gallops or rubs  Gastrointestinal: Bowel Sounds present, soft, nontender, nondistended, no guarding, no rebound  Extremities: No peripheral edema  Vascular: 2+ peripheral pulses  Neurological: A/O x 3, no focal deficits  Musculoskeletal: 5/5 strength b/l upper and lower extremities  Skin: No rashes    MEDICATIONS:  MEDICATIONS  (STANDING):  atorvastatin 80 milliGRAM(s) Oral at bedtime  cephalexin 500 milliGRAM(s) Oral four times a day  lactobacillus acidophilus 1 Tablet(s) Oral two times a day  levothyroxine 25 MICROGram(s) Oral daily      LABS: All Labs Reviewed:                        13.8   7.37  )-----------( 236      ( 10 Feb 2020 07:31 )             40.1     02-10    143  |  112<H>  |  12  ----------------------------<  104<H>  3.8   |  26  |  0.95    Ca    8.8      10 Feb 2020 07:31  Phos  2.9     02-10  Mg     2.0     02-10    TPro  6.7  /  Alb  3.7  /  TBili  0.3  /  DBili  x   /  AST  15  /  ALT  20  /  AlkPhos  88  02-09    PT/INR - ( 10 Feb 2020 07:31 )   PT: 11.9 sec;   INR: 1.07 ratio         PTT - ( 10 Feb 2020 07:31 )  PTT:30.4 sec  CARDIAC MARKERS ( 10 Feb 2020 07:31 )  <0.015 ng/mL / x     / x     / x     / x      CARDIAC MARKERS ( 09 Feb 2020 19:44 )  <0.015 ng/mL / x     / x     / x     / x      CARDIAC MARKERS ( 09 Feb 2020 16:15 )  <0.015 ng/mL / x     / x     / x     / x          Blood Culture:     RADIOLOGY/EKG:    DVT PPX:    ADVANCED DIRECTIVE:    DISPOSITION: HPI: Pt is a 66 yo male with a pmh/o HLD, former smoker, s/p tongue cancer, hypothyroidism, finger laceration, preDM?, who presents to ED after sustaining a laceration to his finger at home and 'almost passing out'. Wife at bedside states she noticed him to be sweaty after cutting his finger and looked glassy eyed for about a sec. before coming back to. Pt states during this time he could see and hear her talking but did not know what was happening and was describes seeing white light shining during that second before regaining mentation. Pt did not fall, did not collapse, did not hit head, no parasthesias, shaking, incontinence, cp, palpitations, n/v/d. In ED finger laceration repaired and pt noted to have sinus bradycardia on EKG on avg. 40's while at rest and on monitor anywhere from 50-60bpm. Upon chart review pt has a h/o sinus bradycardia, in HIE two UC visits note HR  HR 54 in 2017, 49 in 2016. Of note, pt is an athlete, long time gymnast.    SUBJECTIVE: Pt seen and evaluated at bedside. No acute events overnight. No headache, dizziness, chest pain, palpitations. Pain well controlled     REVIEW OF SYSTEMS:  CONSTITUTIONAL: No weakness, fevers or chills  EYES/ENT: No visual changes;  No vertigo or throat pain   NECK: No pain or stiffness  RESPIRATORY: No cough, wheezing, hemoptysis; No shortness of breath  CARDIOVASCULAR: No chest pain or palpitations  GASTROINTESTINAL: No abdominal or epigastric pain. No nausea, vomiting, or hematemesis; No diarrhea or constipation. No melena or hematochezia.  GENITOURINARY: No dysuria, frequency or hematuria  NEUROLOGICAL: No numbness or weakness  SKIN: No itching, burning, rashes, or lesions   All other review of systems is negative unless indicated above    Vital Signs Last 24 Hrs  T(C): 36.7 (10 Feb 2020 11:11), Max: 37 (09 Feb 2020 16:25)  T(F): 98.1 (10 Feb 2020 11:11), Max: 98.6 (09 Feb 2020 16:25)  HR: 53 (10 Feb 2020 11:11) (48 - 73)  BP: 113/70 (10 Feb 2020 11:11) (106/69 - 113/78)  BP(mean): --  RR: 18 (10 Feb 2020 11:11) (17 - 18)  SpO2: 97% (10 Feb 2020 11:11) (97% - 99%)    I&O's Summary      CAPILLARY BLOOD GLUCOSE      POCT Blood Glucose.: 143 mg/dL (09 Feb 2020 14:59)      PHYSICAL EXAM:  Constitutional: NAD, awake and alert, well-developed  HEENT: PERR, EOMI, Normal Hearing, MMM  Neck: Soft and supple, No LAD, No JVD  Respiratory: Breath sounds are clear bilaterally, No wheezing, rales or rhonchi  Cardiovascular: S1 and S2, +bradycardic  Gastrointestinal: Bowel Sounds present, soft, nontender, nondistended, no guarding, no rebound  Extremities: No peripheral edema, LEFT HAND: +sutures in place, secured in dressing, dressing c/d/i, FROM, NV intact distally   Vascular: 2+ peripheral pulses  Neurological: A/O x 3, no focal deficits  Musculoskeletal: 5/5 strength b/l upper and lower extremities  Skin: No rashes    MEDICATIONS:  MEDICATIONS  (STANDING):  atorvastatin 80 milliGRAM(s) Oral at bedtime  cephalexin 500 milliGRAM(s) Oral four times a day  lactobacillus acidophilus 1 Tablet(s) Oral two times a day  levothyroxine 25 MICROGram(s) Oral daily      LABS: All Labs Reviewed:                        13.8   7.37  )-----------( 236      ( 10 Feb 2020 07:31 )             40.1     02-10    143  |  112<H>  |  12  ----------------------------<  104<H>  3.8   |  26  |  0.95    Ca    8.8      10 Feb 2020 07:31  Phos  2.9     02-10  Mg     2.0     02-10    TPro  6.7  /  Alb  3.7  /  TBili  0.3  /  DBili  x   /  AST  15  /  ALT  20  /  AlkPhos  88  02-09    PT/INR - ( 10 Feb 2020 07:31 )   PT: 11.9 sec;   INR: 1.07 ratio         PTT - ( 10 Feb 2020 07:31 )  PTT:30.4 sec  CARDIAC MARKERS ( 10 Feb 2020 07:31 )  <0.015 ng/mL / x     / x     / x     / x      CARDIAC MARKERS ( 09 Feb 2020 19:44 )  <0.015 ng/mL / x     / x     / x     / x      CARDIAC MARKERS ( 09 Feb 2020 16:15 )  <0.015 ng/mL / x     / x     / x     / x          Blood Culture:     RADIOLOGY/EKG:    DVT PPX:    ADVANCED DIRECTIVE:    DISPOSITION: HPI: Pt is a 64 yo male with a pmh/o HLD, former smoker, s/p tongue cancer, hypothyroidism, finger laceration, preDM?, who presents to ED after sustaining a laceration to his finger at home and 'almost passing out'. Wife at bedside states she noticed him to be sweaty after cutting his finger and looked glassy eyed for about a sec. before coming back to. Pt states during this time he could see and hear her talking but did not know what was happening and was describes seeing white light shining during that second before regaining mentation. Pt did not fall, did not collapse, did not hit head, no parasthesias, shaking, incontinence, cp, palpitations, n/v/d. In ED finger laceration repaired and pt noted to have sinus bradycardia on EKG on avg. 40's while at rest and on monitor anywhere from 50-60bpm. Upon chart review pt has a h/o sinus bradycardia, in HIE two  visits note HR  HR 54 in 2017, 49 in 2016. Of note, pt is an athlete, long time gymnast.    SUBJECTIVE: Pt seen and evaluated at bedside. No acute events overnight. No headache, dizziness, chest pain, palpitations. Pain well controlled. Dressing clean dry and intact. Offer no other complaints   TELE REVIEWED: HR in low 40s, when patient was sleeping, otherwise 50-60s when awake.     REVIEW OF SYSTEMS:  CONSTITUTIONAL: No weakness, fevers or chills  EYES/ENT: No visual changes;  No vertigo or throat pain   NECK: No pain or stiffness  RESPIRATORY: No cough, wheezing, hemoptysis; No shortness of breath  CARDIOVASCULAR: No chest pain or palpitations  GASTROINTESTINAL: No abdominal or epigastric pain. No nausea, vomiting, or hematemesis; No diarrhea or constipation. No melena or hematochezia.  GENITOURINARY: No dysuria, frequency or hematuria  NEUROLOGICAL: No numbness or weakness  SKIN: No itching, burning, rashes, or lesions   All other review of systems is negative unless indicated above    Vital Signs Last 24 Hrs  T(C): 36.7 (10 Feb 2020 11:11), Max: 37 (09 Feb 2020 16:25)  T(F): 98.1 (10 Feb 2020 11:11), Max: 98.6 (09 Feb 2020 16:25)  HR: 53 (10 Feb 2020 11:11) (48 - 73)  BP: 113/70 (10 Feb 2020 11:11) (106/69 - 113/78)  RR: 18 (10 Feb 2020 11:11) (17 - 18)  SpO2: 97% (10 Feb 2020 11:11) (97% - 99%)      CAPILLARY BLOOD GLUCOSE  POCT Blood Glucose.: 143 mg/dL (09 Feb 2020 14:59)      PHYSICAL EXAM:  Constitutional: NAD, awake and alert, well-developed  HEENT: PERR, EOMI, Normal Hearing, MMM  Neck: Soft and supple, No LAD, No JVD  Respiratory: Breath sounds are clear bilaterally, No wheezing, rales or rhonchi  Cardiovascular: S1 and S2, +bradycardic  Gastrointestinal: Bowel Sounds present, soft, nontender, nondistended, no guarding, no rebound  Extremities: No peripheral edema, LEFT HAND: +sutures in place, secured in dressing, dressing c/d/i, FROM, NV intact distally   Vascular: 2+ peripheral pulses  Neurological: A/O x 3, no focal deficits  Musculoskeletal: 5/5 strength b/l upper and lower extremities  Skin: No rashes    MEDICATIONS:  MEDICATIONS  (STANDING):  atorvastatin 80 milliGRAM(s) Oral at bedtime  cephalexin 500 milliGRAM(s) Oral four times a day  lactobacillus acidophilus 1 Tablet(s) Oral two times a day  levothyroxine 25 MICROGram(s) Oral daily      LABS: All Labs Reviewed:                        13.8   7.37  )-----------( 236      ( 10 Feb 2020 07:31 )             40.1     02-10    143  |  112<H>  |  12  ----------------------------<  104<H>  3.8   |  26  |  0.95    Ca    8.8      10 Feb 2020 07:31  Phos  2.9     02-10  Mg     2.0     02-10    TPro  6.7  /  Alb  3.7  /  TBili  0.3  /  DBili  x   /  AST  15  /  ALT  20  /  AlkPhos  88  02-09    PT/INR - ( 10 Feb 2020 07:31 )   PT: 11.9 sec;   INR: 1.07 ratio         PTT - ( 10 Feb 2020 07:31 )  PTT:30.4 sec  CARDIAC MARKERS ( 10 Feb 2020 07:31 )  <0.015 ng/mL / x     / x     / x     / x      CARDIAC MARKERS ( 09 Feb 2020 19:44 )  <0.015 ng/mL / x     / x     / x     / x      CARDIAC MARKERS ( 09 Feb 2020 16:15 )  <0.015 ng/mL / x     / x     / x     / x          Blood Culture:     RADIOLOGY/EKG:  < from: Xray Chest 1 View- PORTABLE-Urgent (02.09.20 @ 16:41) >  EXAM:  XR CHEST PORTABLE URGENT 1V                            PROCEDURE DATE:  02/09/2020          INTERPRETATION:  Chest one view    HISTORY: Bradycardia    COMPARISON STUDY: None available    Frontal expiratory view of the chest shows the heart to be normal in size. The lungs are clear and there is no evidence of pneumothorax nor pleural effusion.    IMPRESSION:  No active pulmonary disease.    Thank you for the courtesy of this referral.    < end of copied text >      DVT PPX:    ADVANCED DIRECTIVE:    DISPOSITION:

## 2020-02-10 NOTE — ADVANCED PRACTICE NURSE CONSULT - ASSESSMENT
This is a 65 year old male that was admitted to the hospital on 2/9/2020 for laceration to left thumb.  PMH- HLD, former smoker, s/p tongue cancer, hypothyroidism, finger laceration, preDM?, who presents to ED after sustaining a laceration to his finger at home and 'almost passing out".    Consulted for thumb laceration. Patient presents sitting in chair at the bedside. Reports that he was using a sharp knife cutting something at home and cut his finger. Old dressing removed to thumb. Area cleansed with soap and water. Sutures intact. No odor, drainage, or erythema. Patient denies pain. Extremity warm to touch. Positive radial pulse. Patient reports sensation to thumb when light touch applied. New kerlix placed over thumb to protect. Instructed patient to keep area clean and dry and not to shower hand. Gloves given to patient to wear in shower to keep thumb dry. Extra dressings and supplies also given to patient to bring home. Patient reports he has to follow-up to have sutures removed in 10 days.

## 2020-02-10 NOTE — PROGRESS NOTE ADULT - ASSESSMENT
66 yo male with a pmh/o HLD, former smoker, s/p tongue cancer, hypothyroidism, finger laceration, preDM, admitted for:    Near syncope in setting of bradycardia, trauma  -likely vasovagal in etiology, as pt with laceration to finger just prior to sx. with associated severe pain, diaphoresis, and in ED pt with asymptomatic bradycardia as noted on EKG with sinus meche ranging in mid 40's without other abnormality s/p anesthesia admin for lac repair, otherwise HR in 50s-60s  -athlete, long term h/o gymnastics  -no LOC   -TTE performed, awaiting results   -orthostatics negativa  -troponin negative   -lyme titer: negative  -cardiology consult pending      Laceration to finger s/p trauma  -Keflex 500 mg BID x 5 days   -pain control  -wound care   -TDaP UTD     Hypothyroidism  -Synthroid 25mcg       HLD  -Crestor 20mg QD   -DASH/TLC diet    #anticipating discharge today

## 2020-02-18 ENCOUNTER — EMERGENCY (EMERGENCY)
Facility: HOSPITAL | Age: 65
LOS: 0 days | Discharge: HOME | End: 2020-02-18
Attending: EMERGENCY MEDICINE
Payer: MEDICARE

## 2020-02-18 VITALS — WEIGHT: 154.98 LBS | HEIGHT: 67 IN

## 2020-02-18 VITALS
TEMPERATURE: 98 F | DIASTOLIC BLOOD PRESSURE: 69 MMHG | SYSTOLIC BLOOD PRESSURE: 97 MMHG | RESPIRATION RATE: 16 BRPM | OXYGEN SATURATION: 97 % | HEART RATE: 54 BPM

## 2020-02-18 DIAGNOSIS — E03.9 HYPOTHYROIDISM, UNSPECIFIED: ICD-10-CM

## 2020-02-18 DIAGNOSIS — Z85.810 PERSONAL HISTORY OF MALIGNANT NEOPLASM OF TONGUE: ICD-10-CM

## 2020-02-18 DIAGNOSIS — Z48.02 ENCOUNTER FOR REMOVAL OF SUTURES: ICD-10-CM

## 2020-02-18 DIAGNOSIS — E78.5 HYPERLIPIDEMIA, UNSPECIFIED: ICD-10-CM

## 2020-02-18 PROCEDURE — L9994: CPT

## 2020-02-18 PROCEDURE — G0463: CPT

## 2020-02-18 NOTE — ED STATDOCS - PROGRESS NOTE DETAILS
64 yo male with a PMH of tongue CA, hld, hypothyroid presents with suture removal to the L thumb afte cutting himself while working at home. Denies redness, d/c, fever. Pt came a little early for suture removal but states that he will be going to [place] today. Advised pt of the risks of wound opening early with sutures removed and pt is ok and would like them removed. -Herminio Perry PA-C

## 2020-02-18 NOTE — ED STATDOCS - CLINICAL SUMMARY MEDICAL DECISION MAKING FREE TEXT BOX
PT is early for suture removal. Discussed risk benefits of removal. Pt aware it can dehisce if removed early. Pt comfortable. Will remove at this time. d/c

## 2020-02-18 NOTE — ED STATDOCS - PATIENT PORTAL LINK FT
You can access the FollowMyHealth Patient Portal offered by [facility] by registering at the following website: [email or web address]. By joining [employer]’s FollowMyHealth portal, you will also be able to view your health information using other applications (apps) compatible with our system.

## 2020-02-18 NOTE — ED STATDOCS - SKIN, MLM
7 sutures in place to L first digit along PIP joint along lateral aspect. Clean dry intact. Cap refill less than 2 seconds.

## 2020-04-14 RX ORDER — LEVOTHYROXINE SODIUM 125 MCG
1 TABLET ORAL
Qty: 0 | Refills: 0 | DISCHARGE

## 2020-08-25 NOTE — H&P ADULT - CLICK TO LAUNCH ORM
. Bactrim Counseling:  I discussed with the patient the risks of sulfa antibiotics including but not limited to GI upset, allergic reaction, drug rash, diarrhea, dizziness, photosensitivity, and yeast infections.  Rarely, more serious reactions can occur including but not limited to aplastic anemia, agranulocytosis, methemoglobinemia, blood dyscrasias, liver or kidney failure, lung infiltrates or desquamative/blistering drug rashes.

## 2020-09-26 PROBLEM — E78.5 HYPERLIPIDEMIA, UNSPECIFIED: Chronic | Status: ACTIVE | Noted: 2020-02-09

## 2020-09-26 PROBLEM — Z86.39 PERSONAL HISTORY OF OTHER ENDOCRINE, NUTRITIONAL AND METABOLIC DISEASE: Chronic | Status: ACTIVE | Noted: 2020-02-09

## 2020-10-01 ENCOUNTER — OUTPATIENT (OUTPATIENT)
Dept: OUTPATIENT SERVICES | Facility: HOSPITAL | Age: 65
LOS: 1 days | End: 2020-10-01
Payer: MEDICARE

## 2020-10-01 VITALS
DIASTOLIC BLOOD PRESSURE: 83 MMHG | HEART RATE: 63 BPM | OXYGEN SATURATION: 99 % | TEMPERATURE: 98 F | WEIGHT: 151.9 LBS | RESPIRATION RATE: 14 BRPM | HEIGHT: 66.5 IN | SYSTOLIC BLOOD PRESSURE: 132 MMHG

## 2020-10-01 DIAGNOSIS — Z01.818 ENCOUNTER FOR OTHER PREPROCEDURAL EXAMINATION: ICD-10-CM

## 2020-10-01 DIAGNOSIS — K40.90 UNILATERAL INGUINAL HERNIA, WITHOUT OBSTRUCTION OR GANGRENE, NOT SPECIFIED AS RECURRENT: ICD-10-CM

## 2020-10-01 DIAGNOSIS — Z98.52 VASECTOMY STATUS: Chronic | ICD-10-CM

## 2020-10-01 NOTE — H&P PST ADULT - NSICDXPASTMEDICALHX_GEN_ALL_CORE_FT
PAST MEDICAL HISTORY:  CA - Cancer of Tongue had radiation-cancer free for 7 yrs    H/O: hypothyroidism     HLD (hyperlipidemia)

## 2020-10-01 NOTE — H&P PST ADULT - ASSESSMENT
this is a 66 y/o male who is scheduled for right hernia repair on 10/8/20 this is a 64 y/o male who is scheduled for right hernia repair on 10/8/20        COVOD test negative

## 2020-10-01 NOTE — H&P PST ADULT - HISTORY OF PRESENT ILLNESS
this is a 66 y/o male who had a coughing fit about 2.5 weeks ago and felt a tinge right testicle; then felt a bulge in right groin; diagnosed with hernia; to have repair of same

## 2020-10-01 NOTE — H&P PST ADULT - NSICDXFAMILYHX_GEN_ALL_CORE_FT
FAMILY HISTORY:  FH: lung cancer, father  Maternal family history of cancer, mother  from cancer-breast, parkinsons-age 88 living  No family history of diabetes mellitus, or syncope

## 2020-10-01 NOTE — H&P PST ADULT - NSANTHOSAYNRD_GEN_A_CORE
No. SAVAGE screening performed.  STOP BANG Legend: 0-2 = LOW Risk; 3-4 = INTERMEDIATE Risk; 5-8 = HIGH Risk

## 2020-10-01 NOTE — H&P PST ADULT - ENDOCRINE
Referred by: Christine Barnhart MD; Medical Diagnosis (from order):    Diagnosis Information      Diagnosis    719.41 (ICD-9-CM) - M25.511 (ICD-10-CM) - Right shoulder pain, unspecified chronicity              Physical Therapy -  Initial Evaluation    Visit:  1   Treatment Diagnosis: right: shoulder symptoms with increased pain/symptoms, impaired posture, impaired range of motion, impaired strength, impaired activity tolerance  Date of onset:  Chronic longstanding history   Chart reviewed at time of initial evaluation (relevant co-morbidities, allergies, tests and medications listed): Patient Active Problem List:     Carotid artery disease (CMS/HCC)     Claudication (CMS/HCC)     History of colon polyps     Abnormal CT of the chest     Chronic bilateral low back pain without sciatica     Daytime hypersomnia     History of laryngeal cancer     Hyperlipidemia     Malignant neoplasm of overlapping sites of bladder (CMS/HCC)     Obstructive sleep apnea syndrome     Psychophysiological insomnia     Tobacco use     PTSD (post-traumatic stress disorder)     Elevated PSA     Coronary artery disease involving native coronary artery of native heart     Rhinitis, chronic    *allergies and medication list reviewed*  Diagnostic Tests: X-ray: reviewed.  MRI studies: reviewed.     SUBJECTIVE                                                                                                             Pt presents to PT with reports of chronic, longstanding history of right shoulder \"ache\" in both the anterior and posterior R shoulder \"which has been present for years.\" Denies any macrotrauma. Patient identified a \"bump\" present in the right suprascapular region in January 2020, \"which doesn't hurt unless you press hard on it.\"  A right shoulder US was obtained on 1/20/20 revealing: \"Focused sonographic imaging was performed in the region of the rightsuprascapular palpable lump. Deep to muscle, a vague hypoechoic mass is measured at 2.0 x  1.6 x 1.4 cm. MRI with contrast is recommended for further assessment to exclude sarcoma.\" MRI was obtained on 3/12/20 revealing: mild partial-thickness articular sided tearing and fraying of the infraspinatus with associated tendinosis. There is also mild articular sided fraying and tendinosis of the supraspinatus. Pt underwent PT in January 2020 with improvements in mobility, however, continued pain. Pt followed up with ortho and was advised to proceed with PT as well as obtain further US imaging to evaluate palpable mass in right suprascapular region. Since previous bout of therapy, pt states that the pain has improved, but the mobility \"is not there.\" Pt has been compliant with previously prescribed HEP including supine AAROM, resisted IR/ER, and resisted bicep curls. Reports greatest difficulty with lifting and overhead activity. PMH remarkable for previous left shoulder surgery.            Pain / Symptoms:  Pain/symptom is: intermittent  Pain rating (out of 10): Current: 0 ; Best: 0; Worst: 0  Location: N/A    Quality / Description:. N/A  Alleviating Factors:. N/A    Progression since onset: improved  Function:   Limitations / Exacerbation Factors: difficulty, Pt is a  - is unable to do the praise (bilateral scaption) for an extended period, house/yard work, overhead activity, holding grandchildren   Prior Level of Function:, Reduced difficulty with above functional tasks   Patient Goals: increased motion and increased strength    Prior treatment: outpatient PT  Discharged from hospital, home health, or skilled nursing facility in last 30 days: no    Home Environment:   Patient lives with son and daughter-in-law  Assistance available: as needed  Feels safe at home/work/school.  2 or more falls or an unexplained fall with injury in the last year:  No    OBJECTIVE                                                                                                                     Observation:   Cervical:  forward head   Shoulder: rounded    Right Scapula: depressed  Range of Motion (ROM)   (norms in parentheses, degrees unless noted, active unless noted):   Shoulder:     - Flexion (180):        • Left: 142         • Right: 105 pain     - Extension (50):        • Left: 75         • Right: 70     - Abduction (180):        • Left: 150         • Right: 58    - Internal Rotation at 0°:          • Left: 55        • Right: 40  Details / Comments: ER: left: level of T7, Right: level of T10     *unable to assess PROM due to notable guarding in all planes*    Supine flexion AAROM: 150 degrees  Supine abduction AAROM: 110 degrees  Supine ER AAROM: 70 degrees   Standing flexion AAROM: 130 degrees     *excessive UT recruitment/shoulder shrug with flexion and abduction     Strength  (out of 5 unless noted, standard test position unless noted, lbs tested with hand held dynamometer)   Shoulder:     - Flexion:         • Left: WNL         • Right: 3+, pain     - Extension:           • Left: WNL        • Right: 4 and 4+    - Abduction:        • Left: WNL        • Right: 3, pain    - Adduction:           • Left: WNL    - Internal Rotation:          • Right (at 0°): 4-    - External Rotation:         • Left (at 0°): WNL        • Right (at 0°) :4-     Palpation:       Right: Upper Trapezius: tender; Infraspinatus: tender; Supraspinatus: tender;      Quick Disabilities of the Arm, Shoulder and Hand (QDASH): Score: 28 (11-55), Calculated Score 38.64 (0-100)  scored 0-100; a higher score indicates greater disability    TREATMENT                                                                                                                  Therapeutic Exercise:  Patient educated in pathokinesiology of current condition, plan of care, and therapist and patient responsibilities.   Patient educated in importance of \"active rest\" and appropriate activity modification at this time to prevent exacerbation/worsening of symptoms and to allow for  tissue recovery.   Discussed importance of continued mobility through pain free range to prevent adhesive capsulitis.     *all exercises instructed in pain free ROM, RUE   GH flexion wall slides with LUE assist x 5 reps (130 degrees)  Supine flexion AAROM with dowel x 5 reps (150 degrees)   Supine ER AAROM with dowel x 5 reps (70 degrees)   Submaximal isometric flexion/extension, IR/ER, abduction/adduction 5s hold x 3 reps each, towel roll placed under elbow, cues for maintenance of scapular set and performance in pain-free range.         Skilled input: verbal instruction/cues and tactile instruction/cues    Writer verbally educated and received verbal consent for hand placement, positioning of patient, and techniques to be performed today from patient for therapist position for techniques, hand placement and palpation for techniques and clothing adjustments for techniques as described above and how they are pertinent to the patient's plan of care.    Home Exercise Program: (*above indicates provided as part of home exercise program)  Access Code: [email or web address]   URL: [email or web address]   Date: 06/22/2020   Prepared by: Arnold Vasquez      Exercises  · Supine Shoulder Flexion with Dowel - 10 reps - 1-2 sets - 1x daily - 5x weekly  · Supine Shoulder External Rotation with Resistance - 10 reps - 1-2 sets - 1x daily - 5x weekly  · Supine Shoulder Abduction AAROM with Dowel - 10 reps - 1-2 sets - 1x daily - 5x weekly  · Shoulder Flexion Wall Walk - 10 reps - 1-2 sets - 1x daily - 5x weekly  · Standing Isometric Shoulder Flexion with Doorway - Arm Bent - 10 reps - 1-2 sets - 5 hold - 1x daily - 5x weekly  · Standing Isometric Shoulder Extension with Doorway - Arm Bent - 10 reps - 1-2 sets - 5 hold - 1x daily - 5x weekly  · Standing Isometric Shoulder Internal Rotation with Towel Roll at Doorway - 10 reps - 1-2 sets - 5 hold - 1x daily - 5x weekly  · Standing Isometric Shoulder External Rotation with Doorway -  10 reps - 1-2 sets - 5 hold - 1x daily - 5x weekly  · Standing Isometric Shoulder Abduction with Doorway - Arm Bent - 10 reps - 1-2 sets - 5 hold - 1x daily - 5x weekly  · Standing Isometric Shoulder Adduction with Ball - 10 reps - 1-2 sets - 5 hold - 1x daily - 5x weekly  · Scapular Retraction with Resistance - 10 reps - 1-2 sets - 5-10 hold - 1x daily - 5x weekly          ASSESSMENT                                                                                                            right shoulder, increased pain/symptoms, impaired posture, impaired range of motion, impaired strength and impaired activity tolerance.  Pt presents with signs and symptoms consistent with right supraspinatus and infraspinatus pathology as confirmed by MRI.     Prognosis: patient will benefit from skilled therapy  Rehabilitative Potential: good  Predicted patient presentation: Moderate (evolving) - Patient comorbidities and complexities, as defined above, may have varying impact on steady progress for prescribed plan of care.      Pain/symptoms after session: 0  Patient Education:   Who will be receiving education: patient  Are they ready to learn: yes  Preferred learning style: written and verbal  Barriers to learning: no barriers apparent at this time  Results of above outlined education: Verbalizes understanding and Demonstrates understanding    This note sent for referring provider signature      PLAN                                                                                                                           The following skilled interventions to be implemented to achieve goals listed below:  Activities of Daily Living/Self Care (21351)  Dry Needling - Unlisted physical medicine/rehabilitation service or procedure (09141/55404.02)  Manual Therapy (35747)  Neuromuscular Re-Education (30208)  Therapeutic Activity (98108)  Therapeutic Exercise (68638)  Electrical Stimulation (85748//72636)   Heat/Cold  (08622)  Ultrasound/Phonophoresis (52005)  Strapping      Frequency / Duration: 1 times per week tapering as patient progresses for an estimated total of 8 visits for 8 weeks    patient involved in and agreed to plan of care and goals.  Patient given attendance policy at time of initial evaluation.    Suggestions for next session as indicated: Progress per plan of care; assess response to HEP; initiate flexion table stretch, standing abduction AAROM, pulleys, supine flexion AROM, SL ER AROM    GOALS                                                                                                                       Long Term Goals: To be met by end of plan of care:      Home Exercise Program: Independent with progressed and modified home exercise program (HEP)      Pain: Decrease pain/symptoms to 2 with overhead activity   Strength: Improve involved strength to  4-, right shoulder abduction and flexion   Range of Motion: Improve involved range of motion (ROM) to   WFL abduction and flexion     Reaching: Reach at and above shoulder height, out to side, behind back, overhead and with reported manageable/tolerable difficulty     Patient Reported Outcome Measure: Improvement in function /disability/impairment as indicated by Quick DASH (minimal clinically important difference = 15.91) < or =   20       Procedures and total treatment time documented Time Entry flowsheet.     details…

## 2020-10-01 NOTE — H&P PST ADULT - NSICDXPROBLEM_GEN_ALL_CORE_FT
PROBLEM DIAGNOSES  Problem: Right inguinal hernia  Assessment and Plan: right inguinal hernia repair, covid appt given. preop instructions given, patient had bloodwork and ekg and went to pcp for medical clearance

## 2020-10-02 DIAGNOSIS — Z11.59 ENCOUNTER FOR SCREENING FOR OTHER VIRAL DISEASES: ICD-10-CM

## 2020-10-02 PROCEDURE — G0463: CPT

## 2020-10-05 NOTE — ASU DISCHARGE PLAN (ADULT/PEDIATRIC) - ASU DC SPECIAL INSTRUCTIONSFT
REST! Apply ice packs to incision sites 20 mins on, 20 mins off every 4 hours for the first 24 hours.    If taking narcotic pain medications, may take COLACE (OTC stool softener) as directed to prevent constipation.    Increase ambulation and utilize incentive spirometer as directed.  Please remove On-Q pain pump catheter in 48 hours (when empty) and apply a band-aid to the insertion site.  Please call Dr. NANCY Pedroza's office ([phone]) to schedule a follow-up appointment to be seen in 7-10 days. REST! Apply ice packs to incision sites 20 mins on, 20 mins off every 4 hours for the first 24 hours.    If taking narcotic pain medications, may take COLACE (OTC stool softener) as directed to prevent constipation.    Increase ambulation and utilize incentive spirometer as directed.    Please call [facility] office ([phone]) to schedule a follow-up appointment to be seen in 10-14 days.

## 2020-10-05 NOTE — ASU DISCHARGE PLAN (ADULT/PEDIATRIC) - CARE PROVIDER_API CALL
Ruben Pedroza S  SURGERY  [facility], [address] [place] [address]  Phone: [phone]  Fax: [phone]  Follow Up Time:    Madison Manning  COLON/RECTAL SURGERY  [address] [facility], [address]  [place] [address]  Phone: [phone]  Fax: [phone]  Follow Up Time:

## 2020-10-05 NOTE — ASU DISCHARGE PLAN (ADULT/PEDIATRIC) - FOLLOW UP APPOINTMENTS
911 or go to the nearest Emergency Room/[facility] 911 or go to the nearest Emergency Room

## 2020-10-05 NOTE — ASU DISCHARGE PLAN (ADULT/PEDIATRIC) - PROCEDURE
RIGHT inguinal hernia repair with mesh, insertion of on-Q pain pump RIGHT inguinal hernia repair with mesh

## 2020-10-05 NOTE — ASU DISCHARGE PLAN (ADULT/PEDIATRIC) - CALL YOUR DOCTOR IF YOU HAVE ANY OF THE FOLLOWING:
Bleeding that does not stop/Excessive diarrhea/Fever greater than (need to indicate Fahrenheit or Celsius)/Swelling that gets worse/Wound/Surgical Site with redness, or foul smelling discharge or pus/Unable to urinate/Inability to tolerate liquids or foods/Increased irritability or sluggishness/Nausea and vomiting that does not stop/Pain not relieved by Medications/Numbness, tingling, color or temperature change to extremity

## 2020-10-06 ENCOUNTER — OUTPATIENT (OUTPATIENT)
Dept: OUTPATIENT SERVICES | Facility: HOSPITAL | Age: 65
LOS: 1 days | End: 2020-10-06
Payer: MEDICARE

## 2020-10-06 DIAGNOSIS — Z98.52 VASECTOMY STATUS: Chronic | ICD-10-CM

## 2020-10-06 DIAGNOSIS — Z11.59 ENCOUNTER FOR SCREENING FOR OTHER VIRAL DISEASES: ICD-10-CM

## 2020-10-06 LAB — SARS-COV-2 RNA SPEC QL NAA+PROBE: SIGNIFICANT CHANGE UP

## 2020-10-06 PROCEDURE — U0003: CPT

## 2020-10-07 ENCOUNTER — TRANSCRIPTION ENCOUNTER (OUTPATIENT)
Age: 65
End: 2020-10-07

## 2020-10-07 NOTE — ASU PATIENT PROFILE, ADULT - PMH
CA - Cancer of Tongue  had radiation-cancer free for 7 yrs  H/O: hypothyroidism    HLD (hyperlipidemia)

## 2020-10-08 ENCOUNTER — OUTPATIENT (OUTPATIENT)
Dept: OUTPATIENT SERVICES | Facility: HOSPITAL | Age: 65
LOS: 1 days | End: 2020-10-08
Payer: MEDICARE

## 2020-10-08 ENCOUNTER — RESULT REVIEW (OUTPATIENT)
Age: 65
End: 2020-10-08

## 2020-10-08 VITALS
HEART RATE: 63 BPM | RESPIRATION RATE: 12 BRPM | SYSTOLIC BLOOD PRESSURE: 132 MMHG | TEMPERATURE: 98 F | DIASTOLIC BLOOD PRESSURE: 83 MMHG | HEIGHT: 65 IN | WEIGHT: 147.49 LBS | OXYGEN SATURATION: 97 %

## 2020-10-08 VITALS
RESPIRATION RATE: 16 BRPM | OXYGEN SATURATION: 100 % | DIASTOLIC BLOOD PRESSURE: 84 MMHG | HEART RATE: 60 BPM | SYSTOLIC BLOOD PRESSURE: 136 MMHG | TEMPERATURE: 98 F

## 2020-10-08 DIAGNOSIS — K40.90 UNILATERAL INGUINAL HERNIA, WITHOUT OBSTRUCTION OR GANGRENE, NOT SPECIFIED AS RECURRENT: ICD-10-CM

## 2020-10-08 DIAGNOSIS — Z98.52 VASECTOMY STATUS: Chronic | ICD-10-CM

## 2020-10-08 PROCEDURE — 88302 TISSUE EXAM BY PATHOLOGIST: CPT

## 2020-10-08 PROCEDURE — 88302 TISSUE EXAM BY PATHOLOGIST: CPT | Mod: 26

## 2020-10-08 PROCEDURE — 88304 TISSUE EXAM BY PATHOLOGIST: CPT | Mod: 26

## 2020-10-08 PROCEDURE — C1781: CPT

## 2020-10-08 PROCEDURE — 88304 TISSUE EXAM BY PATHOLOGIST: CPT

## 2020-10-08 PROCEDURE — 49505 PRP I/HERN INIT REDUC >5 YR: CPT | Mod: RT

## 2020-10-08 RX ORDER — OXYCODONE HYDROCHLORIDE 5 MG/1
5 TABLET ORAL ONCE
Refills: 0 | Status: DISCONTINUED | OUTPATIENT
Start: 2020-10-08 | End: 2020-10-08

## 2020-10-08 RX ORDER — CEFAZOLIN SODIUM 1 G
2000 VIAL (EA) INJECTION ONCE
Refills: 0 | Status: COMPLETED | OUTPATIENT
Start: 2020-10-08 | End: 2020-10-08

## 2020-10-08 RX ORDER — ONDANSETRON 8 MG/1
4 TABLET, FILM COATED ORAL ONCE
Refills: 0 | Status: DISCONTINUED | OUTPATIENT
Start: 2020-10-08 | End: 2020-10-08

## 2020-10-08 RX ORDER — OXYCODONE AND ACETAMINOPHEN 5; 325 MG/1; MG/1
1 TABLET ORAL
Qty: 20 | Refills: 0
Start: 2020-10-08

## 2020-10-08 RX ORDER — HYDROMORPHONE HYDROCHLORIDE 2 MG/ML
0.5 INJECTION INTRAMUSCULAR; INTRAVENOUS; SUBCUTANEOUS
Refills: 0 | Status: DISCONTINUED | OUTPATIENT
Start: 2020-10-08 | End: 2020-10-08

## 2020-10-08 RX ORDER — CHLORHEXIDINE GLUCONATE 213 G/1000ML
1 SOLUTION TOPICAL ONCE
Refills: 0 | Status: COMPLETED | OUTPATIENT
Start: 2020-10-08 | End: 2020-10-08

## 2020-10-08 RX ADMIN — CHLORHEXIDINE GLUCONATE 1 APPLICATION(S): 213 SOLUTION TOPICAL at 07:00

## 2020-10-08 NOTE — BRIEF OPERATIVE NOTE - NSICDXBRIEFPROCEDURE_GEN_ALL_CORE_FT
PROCEDURES:  Open repair of inguinal hernia using mesh in adult 08-Oct-2020 08:39:19 RIGHT Olya Valdez

## 2020-11-29 ENCOUNTER — EMERGENCY (EMERGENCY)
Facility: HOSPITAL | Age: 65
LOS: 0 days | Discharge: HOME | End: 2020-11-29
Attending: EMERGENCY MEDICINE
Payer: MEDICARE

## 2020-11-29 VITALS
HEART RATE: 62 BPM | SYSTOLIC BLOOD PRESSURE: 125 MMHG | OXYGEN SATURATION: 95 % | RESPIRATION RATE: 17 BRPM | DIASTOLIC BLOOD PRESSURE: 65 MMHG | TEMPERATURE: 98 F

## 2020-11-29 VITALS
DIASTOLIC BLOOD PRESSURE: 72 MMHG | HEART RATE: 59 BPM | RESPIRATION RATE: 18 BRPM | WEIGHT: 154.98 LBS | SYSTOLIC BLOOD PRESSURE: 118 MMHG | OXYGEN SATURATION: 98 % | HEIGHT: 65 IN | TEMPERATURE: 98 F

## 2020-11-29 DIAGNOSIS — J02.9 ACUTE PHARYNGITIS, UNSPECIFIED: ICD-10-CM

## 2020-11-29 DIAGNOSIS — Z92.3 PERSONAL HISTORY OF IRRADIATION: ICD-10-CM

## 2020-11-29 DIAGNOSIS — Z85.810 PERSONAL HISTORY OF MALIGNANT NEOPLASM OF TONGUE: ICD-10-CM

## 2020-11-29 DIAGNOSIS — R07.0 PAIN IN THROAT: ICD-10-CM

## 2020-11-29 DIAGNOSIS — M54.2 CERVICALGIA: ICD-10-CM

## 2020-11-29 DIAGNOSIS — E78.5 HYPERLIPIDEMIA, UNSPECIFIED: ICD-10-CM

## 2020-11-29 DIAGNOSIS — Z87.891 PERSONAL HISTORY OF NICOTINE DEPENDENCE: ICD-10-CM

## 2020-11-29 DIAGNOSIS — E03.9 HYPOTHYROIDISM, UNSPECIFIED: ICD-10-CM

## 2020-11-29 DIAGNOSIS — Z98.52 VASECTOMY STATUS: Chronic | ICD-10-CM

## 2020-11-29 LAB
ANION GAP SERPL CALC-SCNC: 3 MMOL/L — LOW (ref 5–17)
BASOPHILS # BLD AUTO: 0.05 K/UL — SIGNIFICANT CHANGE UP (ref 0–0.2)
BASOPHILS NFR BLD AUTO: 0.4 % — SIGNIFICANT CHANGE UP (ref 0–2)
BUN SERPL-MCNC: 13 MG/DL — SIGNIFICANT CHANGE UP (ref 7–23)
CALCIUM SERPL-MCNC: 9.1 MG/DL — SIGNIFICANT CHANGE UP (ref 8.5–10.1)
CHLORIDE SERPL-SCNC: 108 MMOL/L — SIGNIFICANT CHANGE UP (ref 96–108)
CO2 SERPL-SCNC: 30 MMOL/L — SIGNIFICANT CHANGE UP (ref 22–31)
CREAT SERPL-MCNC: 0.94 MG/DL — SIGNIFICANT CHANGE UP (ref 0.5–1.3)
EOSINOPHIL # BLD AUTO: 0.25 K/UL — SIGNIFICANT CHANGE UP (ref 0–0.5)
EOSINOPHIL NFR BLD AUTO: 1.8 % — SIGNIFICANT CHANGE UP (ref 0–6)
GLUCOSE SERPL-MCNC: 115 MG/DL — HIGH (ref 70–99)
HCT VFR BLD CALC: 44.2 % — SIGNIFICANT CHANGE UP (ref 39–50)
HGB BLD-MCNC: 15.2 G/DL — SIGNIFICANT CHANGE UP (ref 13–17)
IMM GRANULOCYTES NFR BLD AUTO: 0.4 % — SIGNIFICANT CHANGE UP (ref 0–1.5)
LYMPHOCYTES # BLD AUTO: 1.26 K/UL — SIGNIFICANT CHANGE UP (ref 1–3.3)
LYMPHOCYTES # BLD AUTO: 9.1 % — LOW (ref 13–44)
MCHC RBC-ENTMCNC: 31.2 PG — SIGNIFICANT CHANGE UP (ref 27–34)
MCHC RBC-ENTMCNC: 34.4 GM/DL — SIGNIFICANT CHANGE UP (ref 32–36)
MCV RBC AUTO: 90.8 FL — SIGNIFICANT CHANGE UP (ref 80–100)
MONOCYTES # BLD AUTO: 1.24 K/UL — HIGH (ref 0–0.9)
MONOCYTES NFR BLD AUTO: 9 % — SIGNIFICANT CHANGE UP (ref 2–14)
NEUTROPHILS # BLD AUTO: 10.94 K/UL — HIGH (ref 1.8–7.4)
NEUTROPHILS NFR BLD AUTO: 79.3 % — HIGH (ref 43–77)
PLATELET # BLD AUTO: 261 K/UL — SIGNIFICANT CHANGE UP (ref 150–400)
POTASSIUM SERPL-MCNC: 3.7 MMOL/L — SIGNIFICANT CHANGE UP (ref 3.5–5.3)
POTASSIUM SERPL-SCNC: 3.7 MMOL/L — SIGNIFICANT CHANGE UP (ref 3.5–5.3)
RBC # BLD: 4.87 M/UL — SIGNIFICANT CHANGE UP (ref 4.2–5.8)
RBC # FLD: 11.9 % — SIGNIFICANT CHANGE UP (ref 10.3–14.5)
S PYO AG SPEC QL IA: NEGATIVE — SIGNIFICANT CHANGE UP
SARS-COV-2 RNA SPEC QL NAA+PROBE: SIGNIFICANT CHANGE UP
SODIUM SERPL-SCNC: 141 MMOL/L — SIGNIFICANT CHANGE UP (ref 135–145)
WBC # BLD: 13.79 K/UL — HIGH (ref 3.8–10.5)
WBC # FLD AUTO: 13.79 K/UL — HIGH (ref 3.8–10.5)

## 2020-11-29 PROCEDURE — 80048 BASIC METABOLIC PNL TOTAL CA: CPT

## 2020-11-29 PROCEDURE — 96365 THER/PROPH/DIAG IV INF INIT: CPT | Mod: XU

## 2020-11-29 PROCEDURE — 70491 CT SOFT TISSUE NECK W/DYE: CPT | Mod: 26

## 2020-11-29 PROCEDURE — 99284 EMERGENCY DEPT VISIT MOD MDM: CPT

## 2020-11-29 PROCEDURE — 87081 CULTURE SCREEN ONLY: CPT

## 2020-11-29 PROCEDURE — 99284 EMERGENCY DEPT VISIT MOD MDM: CPT | Mod: 25

## 2020-11-29 PROCEDURE — 85025 COMPLETE CBC W/AUTO DIFF WBC: CPT

## 2020-11-29 PROCEDURE — U0003: CPT

## 2020-11-29 PROCEDURE — 96361 HYDRATE IV INFUSION ADD-ON: CPT

## 2020-11-29 PROCEDURE — 87880 STREP A ASSAY W/OPTIC: CPT

## 2020-11-29 PROCEDURE — 36415 COLL VENOUS BLD VENIPUNCTURE: CPT

## 2020-11-29 PROCEDURE — 70491 CT SOFT TISSUE NECK W/DYE: CPT

## 2020-11-29 RX ORDER — DEXAMETHASONE 0.5 MG/5ML
10 ELIXIR ORAL ONCE
Refills: 0 | Status: COMPLETED | OUTPATIENT
Start: 2020-11-29 | End: 2020-11-29

## 2020-11-29 RX ORDER — SODIUM CHLORIDE 9 MG/ML
1000 INJECTION INTRAMUSCULAR; INTRAVENOUS; SUBCUTANEOUS ONCE
Refills: 0 | Status: COMPLETED | OUTPATIENT
Start: 2020-11-29 | End: 2020-11-29

## 2020-11-29 RX ORDER — DIPHENHYDRAMINE HYDROCHLORIDE AND LIDOCAINE HYDROCHLORIDE AND ALUMINUM HYDROXIDE AND MAGNESIUM HYDRO
30 KIT ONCE
Refills: 0 | Status: COMPLETED | OUTPATIENT
Start: 2020-11-29 | End: 2020-11-29

## 2020-11-29 RX ADMIN — SODIUM CHLORIDE 1000 MILLILITER(S): 9 INJECTION INTRAMUSCULAR; INTRAVENOUS; SUBCUTANEOUS at 02:47

## 2020-11-29 RX ADMIN — SODIUM CHLORIDE 2000 MILLILITER(S): 9 INJECTION INTRAMUSCULAR; INTRAVENOUS; SUBCUTANEOUS at 01:57

## 2020-11-29 RX ADMIN — Medication 10 MILLIGRAM(S): at 03:33

## 2020-11-29 RX ADMIN — Medication 1 TABLET(S): at 03:32

## 2020-11-29 RX ADMIN — Medication 102 MILLIGRAM(S): at 02:44

## 2020-11-29 RX ADMIN — DIPHENHYDRAMINE HYDROCHLORIDE AND LIDOCAINE HYDROCHLORIDE AND ALUMINUM HYDROXIDE AND MAGNESIUM HYDRO 30 MILLILITER(S): KIT at 03:52

## 2020-11-29 NOTE — ED ADULT TRIAGE NOTE - CHIEF COMPLAINT QUOTE
Pt arrives to ED ambulatory complaining of sore throat, swollen lymph nodes. Hx throat cancer, hypothyroid, HLD.

## 2020-11-29 NOTE — ED ADULT NURSE NOTE - CHPI ED NUR TIMING2
Review of Systems/Medical History  Patient summary reviewed  Chart reviewed      Cardiovascular   Pulmonary  Pneumonia,        GI/Hepatic            Endo/Other     GYN       Hematology   Musculoskeletal       Neurology    CNS neoplasm , intracranial mass,    Psychology           Physical Exam    Airway    Mallampati score: I  TM Distance: >3 FB  Neck ROM: full     Dental       Cardiovascular      Pulmonary      Other Findings        Anesthesia Plan  ASA Score- 4     Anesthesia Type- general with ASA Monitors  Additional Monitors:   Airway Plan: ETT  Plan Factors-    Induction- intravenous  Postoperative Plan-     Informed Consent- Anesthetic plan and risks discussed with patient  I personally reviewed this patient with the CRNA  Discussed and agreed on the Anesthesia Plan with the CRNA  Mike Coyle sudden onset

## 2020-11-29 NOTE — ED PROVIDER NOTE - OBJECTIVE STATEMENT
64 yo male with a PMHx of HLD, former smoker, s/p tongue cancer, hypothyroidism 66 yo male with a PMHx of HLD, former smoker, s/p tongue cancer with radiation treatment about 12 years ago, hypothyroidism c/o sore throat and right anterior neck pain with LAD since yesterday afternoon.  Pt denies any f/c/r, cough, cp or SOB.  Pt hasn't had any recent known sick contacts or travel.  Pt denies neck stiffness or headache.  Pt is not drooling and denies trismus. No recent trauma.

## 2020-11-29 NOTE — ED PROVIDER NOTE - CARE PROVIDER_API CALL
Moose Galeana)  Otolaryngology  [address] [place] [address]  Phone: [phone]  Fax: [phone]  Follow Up Time: 1-3 Days

## 2020-11-29 NOTE — ED PROVIDER NOTE - PATIENT PORTAL LINK FT
You can access the FollowMyHealth Patient Portal offered by [facility] by registering at the following website: [email or web address]. By joining [employer]’s FollowMyHealth portal, you will also be able to view your health information using other applications (apps) compatible with our system.

## 2020-11-29 NOTE — ED ADULT NURSE NOTE - OBJECTIVE STATEMENT
Patient presents to ED complaining of sore throat since this morning. Complains of pain 8/10. Denies any fevers, chills or cough. History of throat cancer. Reports taking tylenol with no relief.

## 2020-11-29 NOTE — ED PROVIDER NOTE - THROAT FINDINGS
uvula midline/no exudate/THROAT RED/NO STRIDOR/NO DROOLING/no trismus; tender right anterior cervical LAD

## 2020-11-29 NOTE — ED PROVIDER NOTE - CLINICAL SUMMARY MEDICAL DECISION MAKING FREE TEXT BOX
Pt p/w sore throat and right anterior LAD without trismus, drooling or stridor.  Pt is non-toxic appearing.  Plan: IV fluids, Decadron IV, rapid strep, Covid swab, CBC, BMP, CT neck to r/o abscess

## 2020-12-03 ENCOUNTER — TRANSCRIPTION ENCOUNTER (OUTPATIENT)
Age: 65
End: 2020-12-03

## 2020-12-10 ENCOUNTER — APPOINTMENT (OUTPATIENT)
Dept: OTOLARYNGOLOGY | Facility: CLINIC | Age: 65
End: 2020-12-10
Payer: MEDICARE

## 2020-12-10 VITALS
HEART RATE: 69 BPM | SYSTOLIC BLOOD PRESSURE: 111 MMHG | TEMPERATURE: 97.1 F | DIASTOLIC BLOOD PRESSURE: 70 MMHG | HEIGHT: 66.5 IN | WEIGHT: 152 LBS | BODY MASS INDEX: 24.14 KG/M2

## 2020-12-10 DIAGNOSIS — Z78.9 OTHER SPECIFIED HEALTH STATUS: ICD-10-CM

## 2020-12-10 DIAGNOSIS — Z80.8 FAMILY HISTORY OF MALIGNANT NEOPLASM OF OTHER ORGANS OR SYSTEMS: ICD-10-CM

## 2020-12-10 DIAGNOSIS — Z80.3 FAMILY HISTORY OF MALIGNANT NEOPLASM OF BREAST: ICD-10-CM

## 2020-12-10 DIAGNOSIS — E03.9 HYPOTHYROIDISM, UNSPECIFIED: ICD-10-CM

## 2020-12-10 DIAGNOSIS — E78.00 PURE HYPERCHOLESTEROLEMIA, UNSPECIFIED: ICD-10-CM

## 2020-12-10 DIAGNOSIS — Z80.1 FAMILY HISTORY OF MALIGNANT NEOPLASM OF TRACHEA, BRONCHUS AND LUNG: ICD-10-CM

## 2020-12-10 PROCEDURE — 31575 DIAGNOSTIC LARYNGOSCOPY: CPT

## 2020-12-10 PROCEDURE — 99204 OFFICE O/P NEW MOD 45 MIN: CPT | Mod: 25

## 2020-12-10 NOTE — HISTORY OF PRESENT ILLNESS
[de-identified] : 65 yro male pt referred by Dr. Sotero Segundo with hx of base of tongue SCC dx 2010. Pt had 35 sessions of radiation, completed in 2010, pt was f/up for 7 years.\par Pt reports that 2 weeks ago he had dysphagia, pain of right neck and swollen lymph nodes, went to the ED at [facility]. As per pt, was told CT did not show anything. Pt was negative for Strep, was discharged with Amoxicillin for 10 days, which helped symptoms. \par Today c/o mild throat pain, dysphagia with liquids and solids for  2 weeks, reports 2-3 pound weight loss. Denies neck pain, swollen neck, dyspnea, dysphonia, fever, chills. Pt is COVID negative, smokes marijuana recreationally, drinks about 2 beers/week.  \par \par \par CT 11/29/20: IMPRESSION:\par Right-sided retropharyngeal edema extending from the C1-C2 level to the C6-C7 level with associated mass effect on the hypopharynx. There is edema tracking along the right sided strap muscles, right sternocleidomastoid muscle and right-sided scalene muscles. Trace edema in the right mandibular space. No obvious source for infection is identified on the CT scan. No discrete abscess or drainable fluid collection.\par \par Post radiation changes in the neck. No gross CT evidence of tumor recurrence. Follow-up laryngoscopy with ENT and follow-up contrast enhanced MRI as an outpatient may be obtained for further evaluation.\par \par Worsening atherosclerosis in both internal carotid arteries. There is borderline mild stenosis at the left ICA origin. There is a short segment moderate stenosis in the proximal left ICA, which appears to be associated with a chronic focal dissection.\par

## 2020-12-10 NOTE — CONSULT LETTER
[Dear  ___] : Dear  [unfilled], [Consult Letter:] : I had the pleasure of evaluating your patient, [unfilled]. [Please see my note below.] : Please see my note below. [Consult Closing:] : Thank you very much for allowing me to participate in the care of this patient.  If you have any questions, please do not hesitate to contact me. [Sincerely,] : Sincerely, [FreeTextEntry2] : Dr Sotero Segundo and Dr Kulwant Meraz [FreeTextEntry3] : \par Dinh Wang MD, FACS\par \par Otolaryngology-Head and Neck Surgery\par [employer] [facility]\par

## 2020-12-10 NOTE — REASON FOR VISIT
[Initial Evaluation] : an initial evaluation for [FreeTextEntry2] : hx base of tongue cancer , dysphagia

## 2020-12-22 ENCOUNTER — OUTPATIENT (OUTPATIENT)
Dept: OUTPATIENT SERVICES | Facility: HOSPITAL | Age: 65
LOS: 1 days | End: 2020-12-22
Payer: MEDICARE

## 2020-12-22 ENCOUNTER — APPOINTMENT (OUTPATIENT)
Dept: MRI IMAGING | Facility: CLINIC | Age: 65
End: 2020-12-22
Payer: MEDICARE

## 2020-12-22 DIAGNOSIS — Z00.8 ENCOUNTER FOR OTHER GENERAL EXAMINATION: ICD-10-CM

## 2020-12-22 DIAGNOSIS — Z98.52 VASECTOMY STATUS: Chronic | ICD-10-CM

## 2020-12-22 PROCEDURE — 70542 MRI ORBIT/FACE/NECK W/DYE: CPT | Mod: 26

## 2020-12-22 PROCEDURE — A9585: CPT

## 2020-12-22 PROCEDURE — 70542 MRI ORBIT/FACE/NECK W/DYE: CPT

## 2020-12-23 ENCOUNTER — TRANSCRIPTION ENCOUNTER (OUTPATIENT)
Age: 65
End: 2020-12-23

## 2021-01-07 ENCOUNTER — APPOINTMENT (OUTPATIENT)
Dept: OTOLARYNGOLOGY | Facility: CLINIC | Age: 66
End: 2021-01-07
Payer: MEDICARE

## 2021-01-07 VITALS
WEIGHT: 154 LBS | HEIGHT: 66.5 IN | HEART RATE: 54 BPM | SYSTOLIC BLOOD PRESSURE: 133 MMHG | DIASTOLIC BLOOD PRESSURE: 85 MMHG | BODY MASS INDEX: 24.46 KG/M2

## 2021-01-07 DIAGNOSIS — J39.0 RETROPHARYNGEAL AND PARAPHARYNGEAL ABSCESS: ICD-10-CM

## 2021-01-07 PROCEDURE — 99214 OFFICE O/P EST MOD 30 MIN: CPT | Mod: 25

## 2021-01-07 PROCEDURE — 31575 DIAGNOSTIC LARYNGOSCOPY: CPT

## 2021-01-07 NOTE — REASON FOR VISIT
[Subsequent Evaluation] : a subsequent evaluation for [FreeTextEntry2] : hx base of tongue cancer and dysphagia

## 2021-01-07 NOTE — CONSULT LETTER
[Dear  ___] : Dear  [unfilled], [Courtesy Letter:] : I had the pleasure of seeing your patient, [unfilled], in my office today. [Please see my note below.] : Please see my note below. [Consult Closing:] : Thank you very much for allowing me to participate in the care of this patient.  If you have any questions, please do not hesitate to contact me. [Sincerely,] : Sincerely, [FreeTextEntry2] : Dr Sotero Segundo and Dr Kulwant Meraz  [FreeTextEntry3] : \par Dinh Wang MD, FACS\par \par Otolaryngology-Head and Neck Surgery\par [employer] [facility]\par

## 2021-01-07 NOTE — HISTORY OF PRESENT ILLNESS
[de-identified] : 65 yro male pt here to review MRI results. Pt was referred by Dr. Sotero Segundo with hx of base of tongue SCC dx 2010. Pt had 35 sessions of radiation, completed in 2010, pt was f/up for 7 years.\par Today pt reports that symptoms are better since last visit. Just some slight throat irrigation with throat clearing. Denies neck pain, swollen neck, or swollen lymph nodes, dysphagia, dyspnea, dysphonia, fever, chills. Weight stable. \par \par MRI 12/26/2020: IMPRESSION:  Limited evaluation of the oral cavity/oropharynx due to dental artifact.\par No aerodigestive abnormality identified.\par No cervical adenopathy.\par Complete review of systems which was performed during a previous encounter was reviewed with the patient and there are no changes except as stated in the HPI section.\par \par \par

## 2021-04-13 ENCOUNTER — TRANSCRIPTION ENCOUNTER (OUTPATIENT)
Age: 66
End: 2021-04-13

## 2021-04-17 ENCOUNTER — TRANSCRIPTION ENCOUNTER (OUTPATIENT)
Age: 66
End: 2021-04-17

## 2021-05-17 ENCOUNTER — TRANSCRIPTION ENCOUNTER (OUTPATIENT)
Age: 66
End: 2021-05-17

## 2021-07-20 ENCOUNTER — TRANSCRIPTION ENCOUNTER (OUTPATIENT)
Age: 66
End: 2021-07-20

## 2021-09-30 ENCOUNTER — TRANSCRIPTION ENCOUNTER (OUTPATIENT)
Age: 66
End: 2021-09-30

## 2021-12-15 ENCOUNTER — TRANSCRIPTION ENCOUNTER (OUTPATIENT)
Age: 66
End: 2021-12-15

## 2022-01-13 ENCOUNTER — APPOINTMENT (OUTPATIENT)
Dept: OTOLARYNGOLOGY | Facility: CLINIC | Age: 67
End: 2022-01-13

## 2022-01-28 ENCOUNTER — APPOINTMENT (OUTPATIENT)
Dept: WOUND CARE | Facility: CLINIC | Age: 67
End: 2022-01-28
Payer: MEDICARE

## 2022-01-28 PROCEDURE — 99204 OFFICE O/P NEW MOD 45 MIN: CPT

## 2022-01-28 NOTE — PHYSICAL EXAM
[Normal Thyroid] : the thyroid was normal [Normal Breath Sounds] : Normal breath sounds [Normal Heart Sounds] : normal heart sounds [de-identified] : NAD [de-identified] : wnl [de-identified] : LETICIAL [Midline] : trachea located in midline position [Normal] : no rashes

## 2022-01-28 NOTE — ASSESSMENT
[FreeTextEntry1] : -65 yro male pt with hx of base of tongue SCC dx 2010. Pt had 35 sessions of radiation, completed in 2010,\par Pt reports that 2 weeks ago he had dysphagia, pain of right neck \par  Neck pain and pharyngeal swelling on CT scan.\par - Was treated with steroids and amoxicillin.\par - Scoped by ENT without significant findings.\par - Discussed possible diagnosis including infection, inflammation of lymphedema. More suggestive of inflammation, possible retropharyngeal tendinitis, delayed radiation injury.\par \par Patient candidate for HBO treatment\par Thorough education and orientation provided\par Patient signed the consents\par Chest Xray ordered\par Await for auth

## 2022-01-28 NOTE — HISTORY OF PRESENT ILLNESS
[de-identified] : 65 yro male pt here for evaluation of HBO treatment.  Pt was referred by Dr. Greenberg Leobardo dentist.\par Pt reports that 2 weeks ago he had dysphagia, pain of right neck and swollen lymph nodes, went to the ED at [facility]. As per pt, was told CT did not show anything. Pt was negative for Strep, was discharged with Amoxicillin for 10 days, which helped symptoms. \par Today c/o mild throat pain, dysphagia with liquids and solids for 2 weeks, reports 2-3 pound weight loss. Denies neck pain, swollen neck, dyspnea, dysphonia, fever, chills. Pt is COVID negative.\par MRI 12/26/2020: IMPRESSION:  Limited evaluation of the oral cavity/oropharynx due to dental artifact.\par No aerodigestive abnormality identified.\par No cervical adenopathy.\par Complete review of systems which was performed during a previous encounter was reviewed with the patient and there are no changes except as stated in the HPI section.\par \par \par

## 2022-02-09 ENCOUNTER — RESULT REVIEW (OUTPATIENT)
Age: 67
End: 2022-02-09

## 2022-02-09 ENCOUNTER — APPOINTMENT (OUTPATIENT)
Dept: RADIOLOGY | Facility: CLINIC | Age: 67
End: 2022-02-09
Payer: MEDICARE

## 2022-02-09 ENCOUNTER — OUTPATIENT (OUTPATIENT)
Dept: OUTPATIENT SERVICES | Facility: HOSPITAL | Age: 67
LOS: 1 days | End: 2022-02-09
Payer: MEDICARE

## 2022-02-09 DIAGNOSIS — Z00.8 ENCOUNTER FOR OTHER GENERAL EXAMINATION: ICD-10-CM

## 2022-02-09 DIAGNOSIS — Z98.52 VASECTOMY STATUS: Chronic | ICD-10-CM

## 2022-02-09 PROCEDURE — 71046 X-RAY EXAM CHEST 2 VIEWS: CPT | Mod: 26

## 2022-02-09 PROCEDURE — 71046 X-RAY EXAM CHEST 2 VIEWS: CPT

## 2022-03-29 NOTE — ED ADULT TRIAGE NOTE - HEIGHT IN INCHES
General FM note    Shai Austin is a 26 y.o. female who presents today for follow up on her  medical conditions as noted below. Shai Austin is c/o of   Chief Complaint   Patient presents with    Weight Management    Otalgia     left       Patient Active Problem List:     Obesity (BMI 30.0-34. 5)     Dichorionic diamniotic twin pregnancy in first trimester     Obesity affecting pregnancy in first trimester     Previous  delivery, antepartum condition or complication     Dichorionic diamniotic twin pregnancy in second trimester     Obesity affecting pregnancy in second trimester     Dichorionic diamniotic twin pregnancy in third trimester     Obesity affecting pregnancy in third trimester     Past Medical History:   Diagnosis Date    Depression       Past Surgical History:   Procedure Laterality Date     SECTION  2014    WISDOM TOOTH EXTRACTION       No family history on file. Current Outpatient Medications   Medication Sig Dispense Refill    phentermine (ADIPEX-P) 37.5 MG tablet Take 1 tablet by mouth every morning (before breakfast) for 30 days. 30 tablet 0    albuterol sulfate HFA (PROVENTIL HFA) 108 (90 Base) MCG/ACT inhaler Inhale 2 puffs into the lungs every 6 hours as needed for Wheezing 18 g 3    ibuprofen (ADVIL;MOTRIN) 600 MG tablet Take 1 tablet by mouth every 6 hours as needed for Pain 120 tablet 0    hydrocortisone 2.5 % cream Apply topically 2 times daily. 20 g 1    desogestrel-ethinyl estradiol (KARIVA) 0.15-0.02/0.01 MG (21/5) per tablet Take 1 tablet by mouth daily 1 packet 3    buPROPion (WELLBUTRIN XL) 150 MG extended release tablet Take 1 tablet by mouth every morning 30 tablet 3    ferrous sulfate (IRON 325) 325 (65 Fe) MG tablet take 1 tablet by mouth twice a day as directed 30 tablet 0    medroxyPROGESTERone (PROVERA) 10 MG tablet Take 2 tablets by mouth 3 times daily for 7 days 42 tablet 0    Misc.  Devices MISC 1 each by Does not apply route once for 1 dose Compression stocking pair. Moderate compression 1 each 0    vitamin D (ERGOCALCIFEROL) 1.25 MG (93814 UT) CAPS capsule Take 1 capsule by mouth once a week 30 capsule 0     No current facility-administered medications for this visit. ALLERGIES:  No Known Allergies    Social History     Tobacco Use    Smoking status: Never Smoker    Smokeless tobacco: Never Used   Substance Use Topics    Alcohol use: No     Alcohol/week: 0.0 standard drinks      Body mass index is 36.26 kg/m². /85   Pulse 85   Temp 98.1 °F (36.7 °C)   Wt 235 lb (106.6 kg)   SpO2 100%   BMI 36.26 kg/m²     Subjective:      HPI    26 y.o. female coming today for weight check. Lost 4 pounds with the 1. prescription of Adipex. Diet: healthy diet, no red meat, no beef or pork. Small portions. Sea food diet. Exercises: not able to go to the Gym due to transportation. Side effects: none. L ear discomfort - since taking out the ear pods - better. She tells me that she did have some acute pain once but she did not know what it was since then she has not been wearing her ear pods. Review of Systems   Constitutional: Negative for fever and unexpected weight change. Pertinent items are noted in HPI. Objective:   Physical Exam  Constitutional: VS (see above). General appearance: normal development, habitus and attention, no deformities. No distress. Eyes: normal conjunctiva and lids. Right tympanic membrane normal.  Left tympanic membrane slight perforation seen. CAV: RRR, no RMG. No edema lower extremities. Pulmo: CTA bilateral, no CWR. Skin: no rashes, lesions or ulcers. Musculoskeletal: normal gait. Nails: no clubbing or cyanosis. Psychiatric: alert and oriented to place, time and person. Normal mood and affect. Assessment:       Diagnosis Orders   1.  Class 2 obesity due to excess calories without serious comorbidity with body mass index (BMI) of 39.0 to 39.9 in adult  phentermine (ADIPEX-P) 37.5 MG tablet   2. Perforation of left tympanic membrane         Plan:      2. Prescription of Adipex provided. Continue exercise and diet. Follow-up as indicated. Patient will continue current diet and exercise regimen. I discussed with her to exercise at least 30 minutes 5 times a week. Decrease carbohydrate intake. Increase fibers and protein. See me back in 4 weeks for weight check. Call if any changes. Stop Adipex if you have any side effects. I discussed with patient that a tympanic membrane perforation will heal without any treatment. Patient will not use any hearing or ear pods anymore. Return in about 4 weeks (around 4/26/2022), or if symptoms worsen or fail to improve, for weight. No orders of the defined types were placed in this encounter. Orders Placed This Encounter   Medications    phentermine (ADIPEX-P) 37.5 MG tablet     Sig: Take 1 tablet by mouth every morning (before breakfast) for 30 days. Dispense:  30 tablet     Refill:  0       Call or return to clinic prn if these symptoms worsen or fail to improve as anticipated. I have reviewed the instructions with the patient, answering all questions to her satisfaction. Dia received counseling on the following healthy behaviors: nutrition, exercise, and medication adherence  Reviewed prior labs and health maintenance. Continue current medications, diet and exercise. Discussed use, benefit, and side effects of prescribed medications. Barriers to medication compliance addressed. Patient given educational materials - see patient instructions. All patient questions answered. Patient voiced understanding.       Electronically signed by Jim Stevens MD on 3/29/2022 at 10:17 AM       (Please note that portions of this note were completed with a voice recognition program. Efforts were made to edit the dictations but occasionally words are mis-transcribed.) 5

## 2022-04-26 ENCOUNTER — APPOINTMENT (OUTPATIENT)
Dept: WOUND CARE | Facility: HOSPITAL | Age: 67
End: 2022-04-26
Payer: MEDICARE

## 2022-04-26 ENCOUNTER — OUTPATIENT (OUTPATIENT)
Dept: OUTPATIENT SERVICES | Facility: HOSPITAL | Age: 67
LOS: 1 days | Discharge: HOME | End: 2022-04-26
Payer: MEDICARE

## 2022-04-26 VITALS
HEIGHT: 66 IN | OXYGEN SATURATION: 95 % | BODY MASS INDEX: 24.75 KG/M2 | SYSTOLIC BLOOD PRESSURE: 113 MMHG | RESPIRATION RATE: 20 BRPM | WEIGHT: 154 LBS | DIASTOLIC BLOOD PRESSURE: 75 MMHG | TEMPERATURE: 97.1 F | HEART RATE: 66 BPM

## 2022-04-26 DIAGNOSIS — Z85.818 PERSONAL HISTORY OF MALIGNANT NEOPLASM OF OTHER SITES OF LIP, ORAL CAVITY, AND PHARYNX: ICD-10-CM

## 2022-04-26 DIAGNOSIS — T66.XXXS RADIATION SICKNESS, UNSPECIFIED, SEQUELA: ICD-10-CM

## 2022-04-26 DIAGNOSIS — M27.2 INFLAMMATORY CONDITIONS OF JAWS: ICD-10-CM

## 2022-04-26 DIAGNOSIS — U07.1 COVID-19: ICD-10-CM

## 2022-04-26 DIAGNOSIS — Z98.52 VASECTOMY STATUS: Chronic | ICD-10-CM

## 2022-04-26 DIAGNOSIS — Z86.19 PERSONAL HISTORY OF OTHER INFECTIOUS AND PARASITIC DISEASES: ICD-10-CM

## 2022-04-26 DIAGNOSIS — Z86.39 PERSONAL HISTORY OF OTHER ENDOCRINE, NUTRITIONAL AND METABOLIC DISEASE: ICD-10-CM

## 2022-04-26 PROCEDURE — G0463: CPT

## 2022-04-26 PROCEDURE — 99213 OFFICE O/P EST LOW 20 MIN: CPT

## 2022-04-26 NOTE — PLAN
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

## 2022-04-26 NOTE — PHYSICAL EXAM
[Normal Breath Sounds] : Normal breath sounds [Normal Heart Sounds] : normal heart sounds [Alert] : alert [Oriented to Person] : oriented to person [Oriented to Place] : oriented to place [Oriented to Time] : oriented to time [Calm] : calm [de-identified] : WD WN 66 Y/O white male in NAD [de-identified] : KEVIN DUNNEM intact [FreeTextEntry1] : ORN- No open wound

## 2022-04-26 NOTE — ASSESSMENT
[Verbal] : Verbal [Written] : Written [Handout] : Handout [Patient] : Patient [Good - alert, interested, motivated] : Good - alert, interested, motivated [Verbalizes knowledge/Understanding] : Verbalizes knowledge/understanding [Signs and symptoms of infection] : sign and symptoms of infection [How and When to Call] : how and when to call [Labs and Tests] : labs and tests [Hyperbaric Therapy] : hyperbaric therapy [Patient responsibility to plan of care] : patient responsibility to plan of care [Stable] : stable [Home] : Home [Ambulatory] : Ambulatory [] : No [FreeTextEntry2] : Alteration in Dental status related to Radiation Therapy- promote optimal Dental status [FreeTextEntry4] : Dr Sutton\par HBOT ordered by Dr Sutton (ORN- Evaristo protocol)- HBO preauth sheet submitted\par HBO orientation provided- pt declined HBO orientation video & states he has already viewed it 01/2022- Dr Sutton advised & aware/ all consents signed\par Pt not interested in having Tooth Extractions until after return from a Business Conference & 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

## 2022-04-26 NOTE — HISTORY OF PRESENT ILLNESS
[FreeTextEntry1] : Pt states he was diagnosed with Squamous Cell Carcinoma 2010 & underwent Radiation Therapy (35 txs) & has been having dental problems & has to undergo Tooth Extractions related to ORN- Pt states he was recommended by his Oral surgeon (Dr Leobardo Valenzeula [phone]) & Radiation Oncologist (Dr Chase Patrick [phone]) to undergo HBOT prior to Oral Surgery

## 2022-04-26 NOTE — REVIEW OF SYSTEMS
[Negative] : Heme/Lymph [FreeTextEntry2] : had covid [FreeTextEntry5] : hyperlipidemia [FreeTextEntry7] : Squamous cell Ca of the tongue [de-identified] : hypothyroid

## 2022-04-27 DIAGNOSIS — M27.2 INFLAMMATORY CONDITIONS OF JAWS: ICD-10-CM

## 2022-04-27 DIAGNOSIS — Z98.890 OTHER SPECIFIED POSTPROCEDURAL STATES: ICD-10-CM

## 2022-04-27 DIAGNOSIS — Z80.1 FAMILY HISTORY OF MALIGNANT NEOPLASM OF TRACHEA, BRONCHUS AND LUNG: ICD-10-CM

## 2022-04-27 DIAGNOSIS — Z86.16 PERSONAL HISTORY OF COVID-19: ICD-10-CM

## 2022-04-27 DIAGNOSIS — Z80.8 FAMILY HISTORY OF MALIGNANT NEOPLASM OF OTHER ORGANS OR SYSTEMS: ICD-10-CM

## 2022-04-27 DIAGNOSIS — Z85.810 PERSONAL HISTORY OF MALIGNANT NEOPLASM OF TONGUE: ICD-10-CM

## 2022-04-27 DIAGNOSIS — Z79.890 HORMONE REPLACEMENT THERAPY: ICD-10-CM

## 2022-04-27 DIAGNOSIS — Z79.82 LONG TERM (CURRENT) USE OF ASPIRIN: ICD-10-CM

## 2022-04-27 DIAGNOSIS — Z80.3 FAMILY HISTORY OF MALIGNANT NEOPLASM OF BREAST: ICD-10-CM

## 2022-04-27 DIAGNOSIS — E03.9 HYPOTHYROIDISM, UNSPECIFIED: ICD-10-CM

## 2022-04-27 DIAGNOSIS — Z79.899 OTHER LONG TERM (CURRENT) DRUG THERAPY: ICD-10-CM

## 2022-04-27 DIAGNOSIS — E78.00 PURE HYPERCHOLESTEROLEMIA, UNSPECIFIED: ICD-10-CM

## 2022-04-28 ENCOUNTER — NON-APPOINTMENT (OUTPATIENT)
Age: 67
End: 2022-04-28

## 2022-05-16 ENCOUNTER — NON-APPOINTMENT (OUTPATIENT)
Age: 67
End: 2022-05-16

## 2022-06-27 ENCOUNTER — NON-APPOINTMENT (OUTPATIENT)
Age: 67
End: 2022-06-27

## 2022-07-05 ENCOUNTER — APPOINTMENT (OUTPATIENT)
Dept: HYPERBARIC MEDICINE | Facility: HOSPITAL | Age: 67
End: 2022-07-05

## 2022-07-06 ENCOUNTER — APPOINTMENT (OUTPATIENT)
Dept: HYPERBARIC MEDICINE | Facility: HOSPITAL | Age: 67
End: 2022-07-06

## 2022-07-06 ENCOUNTER — OUTPATIENT (OUTPATIENT)
Dept: OUTPATIENT SERVICES | Facility: HOSPITAL | Age: 67
LOS: 1 days | Discharge: HOME | End: 2022-07-06
Payer: MEDICARE

## 2022-07-06 ENCOUNTER — NON-APPOINTMENT (OUTPATIENT)
Age: 67
End: 2022-07-06

## 2022-07-06 VITALS
WEIGHT: 154 LBS | DIASTOLIC BLOOD PRESSURE: 82 MMHG | SYSTOLIC BLOOD PRESSURE: 118 MMHG | OXYGEN SATURATION: 95 % | RESPIRATION RATE: 20 BRPM | HEART RATE: 72 BPM | BODY MASS INDEX: 24.75 KG/M2 | TEMPERATURE: 97.9 F | HEIGHT: 66 IN

## 2022-07-06 DIAGNOSIS — Z98.52 VASECTOMY STATUS: Chronic | ICD-10-CM

## 2022-07-06 DIAGNOSIS — L97.801 NON-PRESSURE CHRONIC ULCER OF OTHER PART OF UNSPECIFIED LOWER LEG LIMITED TO BREAKDOWN OF SKIN: ICD-10-CM

## 2022-07-06 DIAGNOSIS — M27.2 INFLAMMATORY CONDITIONS OF JAWS: ICD-10-CM

## 2022-07-06 LAB
A1C WITH ESTIMATED AVERAGE GLUCOSE RESULT: 6 % — HIGH (ref 4–5.6)
ALBUMIN SERPL ELPH-MCNC: 4 G/DL — SIGNIFICANT CHANGE UP (ref 3.3–5)
ALP SERPL-CCNC: 117 U/L — SIGNIFICANT CHANGE UP (ref 40–120)
ALT FLD-CCNC: 33 U/L — SIGNIFICANT CHANGE UP (ref 12–78)
ANION GAP SERPL CALC-SCNC: 5 MMOL/L — SIGNIFICANT CHANGE UP (ref 5–17)
AST SERPL-CCNC: 20 U/L — SIGNIFICANT CHANGE UP (ref 15–37)
BASOPHILS # BLD AUTO: 0.03 K/UL — SIGNIFICANT CHANGE UP (ref 0–0.2)
BASOPHILS NFR BLD AUTO: 0.4 % — SIGNIFICANT CHANGE UP (ref 0–2)
BILIRUB SERPL-MCNC: 0.4 MG/DL — SIGNIFICANT CHANGE UP (ref 0.2–1.2)
BUN SERPL-MCNC: 11 MG/DL — SIGNIFICANT CHANGE UP (ref 7–23)
CALCIUM SERPL-MCNC: 9.4 MG/DL — SIGNIFICANT CHANGE UP (ref 8.5–10.1)
CHLORIDE SERPL-SCNC: 106 MMOL/L — SIGNIFICANT CHANGE UP (ref 96–108)
CO2 SERPL-SCNC: 32 MMOL/L — HIGH (ref 22–31)
CREAT SERPL-MCNC: 1 MG/DL — SIGNIFICANT CHANGE UP (ref 0.5–1.3)
CRP SERPL-MCNC: <3 MG/L — SIGNIFICANT CHANGE UP
EGFR: 82 ML/MIN/1.73M2 — SIGNIFICANT CHANGE UP
EOSINOPHIL # BLD AUTO: 0.28 K/UL — SIGNIFICANT CHANGE UP (ref 0–0.5)
EOSINOPHIL NFR BLD AUTO: 3.8 % — SIGNIFICANT CHANGE UP (ref 0–6)
ERYTHROCYTE [SEDIMENTATION RATE] IN BLOOD: 3 MM/HR — SIGNIFICANT CHANGE UP (ref 0–20)
ESTIMATED AVERAGE GLUCOSE: 126 MG/DL — HIGH (ref 68–114)
GLUCOSE SERPL-MCNC: 126 MG/DL — HIGH (ref 70–99)
HCT VFR BLD CALC: 46.3 % — SIGNIFICANT CHANGE UP (ref 39–50)
HGB BLD-MCNC: 15.5 G/DL — SIGNIFICANT CHANGE UP (ref 13–17)
IMM GRANULOCYTES NFR BLD AUTO: 0.4 % — SIGNIFICANT CHANGE UP (ref 0–1.5)
LYMPHOCYTES # BLD AUTO: 1.22 K/UL — SIGNIFICANT CHANGE UP (ref 1–3.3)
LYMPHOCYTES # BLD AUTO: 16.6 % — SIGNIFICANT CHANGE UP (ref 13–44)
MCHC RBC-ENTMCNC: 31.1 PG — SIGNIFICANT CHANGE UP (ref 27–34)
MCHC RBC-ENTMCNC: 33.5 GM/DL — SIGNIFICANT CHANGE UP (ref 32–36)
MCV RBC AUTO: 92.8 FL — SIGNIFICANT CHANGE UP (ref 80–100)
MONOCYTES # BLD AUTO: 0.7 K/UL — SIGNIFICANT CHANGE UP (ref 0–0.9)
MONOCYTES NFR BLD AUTO: 9.5 % — SIGNIFICANT CHANGE UP (ref 2–14)
NEUTROPHILS # BLD AUTO: 5.08 K/UL — SIGNIFICANT CHANGE UP (ref 1.8–7.4)
NEUTROPHILS NFR BLD AUTO: 69.3 % — SIGNIFICANT CHANGE UP (ref 43–77)
NRBC # BLD: 0 /100 WBCS — SIGNIFICANT CHANGE UP (ref 0–0)
PLATELET # BLD AUTO: 239 K/UL — SIGNIFICANT CHANGE UP (ref 150–400)
POTASSIUM SERPL-MCNC: 4.2 MMOL/L — SIGNIFICANT CHANGE UP (ref 3.5–5.3)
POTASSIUM SERPL-SCNC: 4.2 MMOL/L — SIGNIFICANT CHANGE UP (ref 3.5–5.3)
PREALB SERPL-MCNC: 33 MG/DL — SIGNIFICANT CHANGE UP (ref 20–40)
PROT SERPL-MCNC: 7.3 G/DL — SIGNIFICANT CHANGE UP (ref 6–8.3)
RBC # BLD: 4.99 M/UL — SIGNIFICANT CHANGE UP (ref 4.2–5.8)
RBC # FLD: 12.1 % — SIGNIFICANT CHANGE UP (ref 10.3–14.5)
SODIUM SERPL-SCNC: 143 MMOL/L — SIGNIFICANT CHANGE UP (ref 135–145)
WBC # BLD: 7.34 K/UL — SIGNIFICANT CHANGE UP (ref 3.8–10.5)
WBC # FLD AUTO: 7.34 K/UL — SIGNIFICANT CHANGE UP (ref 3.8–10.5)

## 2022-07-06 PROCEDURE — 36415 COLL VENOUS BLD VENIPUNCTURE: CPT

## 2022-07-06 PROCEDURE — 71046 X-RAY EXAM CHEST 2 VIEWS: CPT | Mod: 26

## 2022-07-06 PROCEDURE — G0463: CPT

## 2022-07-06 PROCEDURE — 83036 HEMOGLOBIN GLYCOSYLATED A1C: CPT

## 2022-07-06 PROCEDURE — 85652 RBC SED RATE AUTOMATED: CPT

## 2022-07-06 PROCEDURE — U0003: CPT

## 2022-07-06 PROCEDURE — 86140 C-REACTIVE PROTEIN: CPT

## 2022-07-06 PROCEDURE — U0005: CPT

## 2022-07-06 PROCEDURE — 85025 COMPLETE CBC W/AUTO DIFF WBC: CPT

## 2022-07-06 PROCEDURE — 84134 ASSAY OF PREALBUMIN: CPT

## 2022-07-06 PROCEDURE — 71046 X-RAY EXAM CHEST 2 VIEWS: CPT

## 2022-07-06 PROCEDURE — 80053 COMPREHEN METABOLIC PANEL: CPT

## 2022-07-07 ENCOUNTER — APPOINTMENT (OUTPATIENT)
Dept: HYPERBARIC MEDICINE | Facility: HOSPITAL | Age: 67
End: 2022-07-07

## 2022-07-07 ENCOUNTER — OUTPATIENT (OUTPATIENT)
Dept: OUTPATIENT SERVICES | Facility: HOSPITAL | Age: 67
LOS: 1 days | Discharge: HOME | End: 2022-07-07
Payer: MEDICARE

## 2022-07-07 VITALS
DIASTOLIC BLOOD PRESSURE: 79 MMHG | TEMPERATURE: 98.1 F | OXYGEN SATURATION: 98 % | SYSTOLIC BLOOD PRESSURE: 146 MMHG | HEART RATE: 57 BPM | RESPIRATION RATE: 18 BRPM

## 2022-07-07 VITALS
DIASTOLIC BLOOD PRESSURE: 88 MMHG | RESPIRATION RATE: 18 BRPM | HEART RATE: 74 BPM | OXYGEN SATURATION: 98 % | TEMPERATURE: 98.3 F | SYSTOLIC BLOOD PRESSURE: 136 MMHG

## 2022-07-07 DIAGNOSIS — M27.2 INFLAMMATORY CONDITIONS OF JAWS: ICD-10-CM

## 2022-07-07 DIAGNOSIS — Z98.52 VASECTOMY STATUS: Chronic | ICD-10-CM

## 2022-07-07 DIAGNOSIS — Z85.810 PERSONAL HISTORY OF MALIGNANT NEOPLASM OF TONGUE: ICD-10-CM

## 2022-07-07 DIAGNOSIS — Z20.822 CONTACT WITH AND (SUSPECTED) EXPOSURE TO COVID-19: ICD-10-CM

## 2022-07-07 DIAGNOSIS — Z01.30 ENCOUNTER FOR EXAMINATION OF BLOOD PRESSURE WITHOUT ABNORMAL FINDINGS: ICD-10-CM

## 2022-07-07 LAB — SARS-COV-2 RNA SPEC QL NAA+PROBE: SIGNIFICANT CHANGE UP

## 2022-07-07 PROCEDURE — G0277: CPT

## 2022-07-07 PROCEDURE — 82962 GLUCOSE BLOOD TEST: CPT

## 2022-07-07 PROCEDURE — 99183 HYPERBARIC OXYGEN THERAPY: CPT

## 2022-07-08 ENCOUNTER — APPOINTMENT (OUTPATIENT)
Dept: HYPERBARIC MEDICINE | Facility: HOSPITAL | Age: 67
End: 2022-07-08

## 2022-07-08 ENCOUNTER — OUTPATIENT (OUTPATIENT)
Dept: OUTPATIENT SERVICES | Facility: HOSPITAL | Age: 67
LOS: 1 days | End: 2022-07-08
Payer: MEDICARE

## 2022-07-08 VITALS
DIASTOLIC BLOOD PRESSURE: 89 MMHG | RESPIRATION RATE: 20 BRPM | HEART RATE: 54 BPM | SYSTOLIC BLOOD PRESSURE: 133 MMHG | TEMPERATURE: 97.7 F | OXYGEN SATURATION: 98 %

## 2022-07-08 VITALS
SYSTOLIC BLOOD PRESSURE: 130 MMHG | RESPIRATION RATE: 18 BRPM | DIASTOLIC BLOOD PRESSURE: 87 MMHG | OXYGEN SATURATION: 98 % | TEMPERATURE: 98.4 F | HEART RATE: 74 BPM

## 2022-07-08 DIAGNOSIS — Z98.52 VASECTOMY STATUS: Chronic | ICD-10-CM

## 2022-07-08 DIAGNOSIS — M27.2 INFLAMMATORY CONDITIONS OF JAWS: ICD-10-CM

## 2022-07-08 PROCEDURE — 82962 GLUCOSE BLOOD TEST: CPT

## 2022-07-08 PROCEDURE — 99183 HYPERBARIC OXYGEN THERAPY: CPT

## 2022-07-08 NOTE — ADDENDUM
[FreeTextEntry1] : The pt. presented to [facility] ambulatory and A&Ox3 for scheduled initial HBOT.\par The pt. was provided a detail overview of the pre-, intra- and post-HBOT patient experience, as well as review of communication with HBO techs., use of NRB AIR MASK over course of HBOT, equalization techniques and importance of frequent clearing during descent. \par The pt. was provided an opportunity ot ask questions re. HBOT; all of the pt's questions were answered and concerns addressed to the pt's satisfaction x CHT prior to start of HBOT.\par \par During the pre-dive screening, the pt. admitted to use of deodorant during shower this morning; the pt. was instructed to remove deodorant using soap and water; pt. complied. \par The pt. presented to HBO suite with contraband -- wrist watch, wedding band, and orange rubber bracelet. \par The pt. was instructed to remove same and complied; the pt. stated he stored these personal effects in his locker. \par The pt. additionally advised of oral hardware that can be removed. \par Due to loose fitting nature of hardware presenting a potential choking/airway hazard, CHT requested that it be removed prior to start of HBOT; same was removed by the patient and stored in locker prior to start of HBO pre-procedure process.\par \par The pt's pre-dive screening found the pt. to be within acceptable limits to begin HBOT.\par "HBO-TO" secondary PDS performed x CHT found the pt. fit to begin HBOTx2. \par \par The pt. began descent towards Rx'd HBOT tx. depth of 2.4 PEACE in chamber # 2 @ 2.2 PSI/min. \par @ approx. 14 PSI(g), the pt. c/o R ear not clearing without pain or discomfort. \par Pt's descent was halted and the pt. was ascended to point of total relief of R ear "clogging" at 10 PSI(g), where the pt. performed successful equalization of R ear and consented to resume descent towards tx. depth.\par The pt's descent resumed @ 2.2 PSI/min. and was without further incident.\par The pt. arrived at 2.4 PEACE and stated total comfort in B/L ears.\par The pt. was observed with visible chest motion and without incident for the duration of observed HBOT.\par The pt. was administered intermittent med. air over course of HBOT without incident. . \par Pt tolerated air breaks well. \par Pt ascended from 2.4 PEACE @ 2.2 PSI/min without incident. \par Pt tolerated tx well.\par \par \par

## 2022-07-08 NOTE — ASSESSMENT
[No change from previous assessment] : No change from previous assessment [Patient prepared for dive] : Patient prepared for dive [Patient undergoing HBO treatment for __________] : Patient undergoing HBO treatment for [unfilled] [Patient descended without problem for 9 minutes] : Patient descended without problem for 9 minutes [No dizziness or thirst] :  No dizziness or thirst [No ear problems] : No ear problems [Vital signs stable] : Vital signs stable [No Chest Pain, shortness of breath] : No Chest Pain, shortness of breath [Tolerating dive well] : Tolerating dive well [Respiratory Rate Stable] : Respiratory Rate Stable [No chest pain, shortness of breath, or ear pain] :  No chest pain, shortness of breath, or ear pain  [Tolerated Ascent well] : Tolerated Ascent well [Vital Signs stable] : Vital Signs stable [No] : No [A physician was present throughout the entire HBOT] : A physician was present throughout the entire HBOT [Clinically Stable] : Clinically stable [Continue Treatment Plan] : Continue treatment plan [FreeTextEntry2] : none

## 2022-07-08 NOTE — PROCEDURE
[Outpatient] : Outpatient [Ambulatory] : Patient is ambulatory. [THIS CHAMBER HAS BEEN CLEANED / DISINFECTED] : This chamber has been cleaned / disinfected according to local and hospital policy and procedure prior to this treatment. [____] : Post-Dive: Time - [unfilled] [___] : Post-Dive: Value - [unfilled] mg/dL [Patient demonstrated and verbalized proper technique for using air break mask] : Patient demonstrated and verbalized proper technique for using air break mask [Patient educated on the risks of SMOKING prior to HBOT with understanding] : Patient educated on the risks of SMOKING prior to HBOT with understanding [Patient educated on the risks of CONSUMING ALCOHOL prior to HBOT with understanding] : Patient educated on the risks of CONSUMING ALCOHOL prior to HBOT with understanding [100% Cotton] : 100% cotton [Empty all pockets] : empty all pockets [No hair oils, wigs, hairpieces, pins] : no hair oils, wigs, hairpieces, pins  [Pre tx medications] : pre tx medications  [No make-up, creams] : no make-up, creams  [No jewelry] : no jewelry  [No matches, cigarettes, lighters] : no matches, cigarettes, lighters  [Hearing aid removed] : hearing aid removed [Dentures removed] : dentures removed [Ground bracelet on pt's wrist] : ground bracelet on pt's wrist  [Contacts removed] : contacts removed  [Remove nail polish] : remove nail polish  [No reading material] : no reading material  [Bra, undergarments removed] : bra, undergarments removed  [No contraindicated dressings] : no contraindicated dressings [Ground Wire - VISUAL Verification - Intact/Free of Obstruction] : Ground Wire - VISUAL Verification - Intact/Free of Obstruction  [Ground Continuity - Verified < 1 ohm w/ Wrist Strap Colton] : Ground Continuity - Verified < 1 ohm w/ Wrist Strap Colton [Number: ___] : Number: [unfilled] [Diagnosis: ___] : Diagnosis: [unfilled] [Clear all fields] : clear all fields [] : No [FreeTextEntry2] : MD Rx Verified Prior to Start of HBOT [FreeTextEntry8] : 17 : 00 [FreeTextEntry6] : 16 : 46 [de-identified] : 17 : 30 [de-identified] : 17 : 35 [de-identified] : 18:05 [de-identified] : 18:10 [de-identified] : 18:40 [de-identified] : 18:50 [de-identified] : 124 Mins [de-identified] : Herman, 07/06/22

## 2022-07-11 ENCOUNTER — APPOINTMENT (OUTPATIENT)
Dept: HYPERBARIC MEDICINE | Facility: HOSPITAL | Age: 67
End: 2022-07-11

## 2022-07-11 ENCOUNTER — OUTPATIENT (OUTPATIENT)
Dept: OUTPATIENT SERVICES | Facility: HOSPITAL | Age: 67
LOS: 1 days | Discharge: HOME | End: 2022-07-11
Payer: MEDICARE

## 2022-07-11 VITALS
HEART RATE: 53 BPM | RESPIRATION RATE: 18 BRPM | DIASTOLIC BLOOD PRESSURE: 76 MMHG | SYSTOLIC BLOOD PRESSURE: 141 MMHG | OXYGEN SATURATION: 97 % | TEMPERATURE: 97.3 F

## 2022-07-11 VITALS
DIASTOLIC BLOOD PRESSURE: 79 MMHG | HEART RATE: 49 BPM | SYSTOLIC BLOOD PRESSURE: 138 MMHG | RESPIRATION RATE: 18 BRPM | TEMPERATURE: 98.4 F | OXYGEN SATURATION: 98 %

## 2022-07-11 DIAGNOSIS — M27.2 INFLAMMATORY CONDITIONS OF JAWS: ICD-10-CM

## 2022-07-11 DIAGNOSIS — Z98.52 VASECTOMY STATUS: Chronic | ICD-10-CM

## 2022-07-11 DIAGNOSIS — Z85.810 PERSONAL HISTORY OF MALIGNANT NEOPLASM OF TONGUE: ICD-10-CM

## 2022-07-11 PROCEDURE — G0277: CPT

## 2022-07-11 PROCEDURE — 82962 GLUCOSE BLOOD TEST: CPT

## 2022-07-11 PROCEDURE — 99183 HYPERBARIC OXYGEN THERAPY: CPT

## 2022-07-12 ENCOUNTER — APPOINTMENT (OUTPATIENT)
Dept: HYPERBARIC MEDICINE | Facility: HOSPITAL | Age: 67
End: 2022-07-12

## 2022-07-12 ENCOUNTER — OUTPATIENT (OUTPATIENT)
Dept: OUTPATIENT SERVICES | Facility: HOSPITAL | Age: 67
LOS: 1 days | Discharge: HOME | End: 2022-07-12
Payer: MEDICARE

## 2022-07-12 VITALS
RESPIRATION RATE: 20 BRPM | TEMPERATURE: 97 F | DIASTOLIC BLOOD PRESSURE: 74 MMHG | HEART RATE: 59 BPM | SYSTOLIC BLOOD PRESSURE: 165 MMHG | OXYGEN SATURATION: 97 %

## 2022-07-12 VITALS
SYSTOLIC BLOOD PRESSURE: 149 MMHG | DIASTOLIC BLOOD PRESSURE: 85 MMHG | TEMPERATURE: 97.5 F | HEART RATE: 51 BPM | RESPIRATION RATE: 18 BRPM | OXYGEN SATURATION: 98 %

## 2022-07-12 DIAGNOSIS — M27.2 INFLAMMATORY CONDITIONS OF JAWS: ICD-10-CM

## 2022-07-12 DIAGNOSIS — Z98.52 VASECTOMY STATUS: Chronic | ICD-10-CM

## 2022-07-12 DIAGNOSIS — Z85.810 PERSONAL HISTORY OF MALIGNANT NEOPLASM OF TONGUE: ICD-10-CM

## 2022-07-12 PROCEDURE — 99183 HYPERBARIC OXYGEN THERAPY: CPT

## 2022-07-12 PROCEDURE — G0277: CPT

## 2022-07-12 PROCEDURE — 82962 GLUCOSE BLOOD TEST: CPT

## 2022-07-12 NOTE — ADDENDUM
[FreeTextEntry1] : The pt. presented to [facility] ambulatory and A&Ox3 for scheduled HBOT.\par \par The pt. was observed to be wearing wrist watch, wedding band, and bracelet upon initial entry to HBO suite threshold. CHT alerted pt. to same and pt. returned to changing room, where he removed items and placed same in his belonging bag. \par \par \par \par During PDS, the pt stated use of AFRIN NASAL DECONGESTANT SPRAY approx. 1 hr. prior to arrival @ [facility].\par The pt. advised of planned ENT F/U per MD suggestion scheduled for this Weds., 07/13/22. \par Pt. stated same should not interfere with HBOT. \par The pt. was provided EARPLANES and observed correctly placing same prior to start of HBOT.\par \par The pt's pre-dive screening was found to be within acceptable limits to begin HBOT.\par "HBO-TO" secondary PDS found the pt. fit to begin HBOTx2.\par The pt. descended @ 2.2 PSI/min. to Rx'd HBOT tx. depth of 2.4 PEACE in chamber # 2 without incident.\par The pt. was observed with visible chest motion and without incident for the duration of HBOT.\par The pt. was administered intermittent med. air over course of HBOT without incident. \par PT ASCENDED FROM 2.4 PEACE @ 2.2 PSI/MIN WITHOUT INCIDENT\par PT TOLERATED TX WELL\par

## 2022-07-12 NOTE — PROCEDURE
[Outpatient] : Outpatient [Ambulatory] : Patient is ambulatory. [THIS CHAMBER HAS BEEN CLEANED / DISINFECTED] : This chamber has been cleaned / disinfected according to local and hospital policy and procedure prior to this treatment. [____] : Post-Dive: Time - [unfilled] [___] : Post-Dive: Value - [unfilled] mg/dL [Patient demonstrated and verbalized proper technique for using air break mask] : Patient demonstrated and verbalized proper technique for using air break mask [Patient educated on the risks of SMOKING prior to HBOT with understanding] : Patient educated on the risks of SMOKING prior to HBOT with understanding [Patient educated on the risks of CONSUMING ALCOHOL prior to HBOT with understanding] : Patient educated on the risks of CONSUMING ALCOHOL prior to HBOT with understanding [100% Cotton] : 100% cotton [Empty all pockets] : empty all pockets [No hair oils, wigs, hairpieces, pins] : no hair oils, wigs, hairpieces, pins  [Pre tx medications] : pre tx medications  [No make-up, creams] : no make-up, creams  [No jewelry] : no jewelry  [No matches, cigarettes, lighters] : no matches, cigarettes, lighters  [Hearing aid removed] : hearing aid removed [Dentures removed] : dentures removed [Ground bracelet on pt's wrist] : ground bracelet on pt's wrist  [Contacts removed] : contacts removed  [Remove nail polish] : remove nail polish  [No reading material] : no reading material  [Bra, undergarments removed] : bra, undergarments removed  [No contraindicated dressings] : no contraindicated dressings [Ground Wire - VISUAL Verification - Intact/Free of Obstruction] : Ground Wire - VISUAL Verification - Intact/Free of Obstruction  [Ground Continuity - Verified < 1 ohm w/ Wrist Strap Colton] : Ground Continuity - Verified < 1 ohm w/ Wrist Strap Colton [Number: ___] : Number: [unfilled] [Diagnosis: ___] : Diagnosis: [unfilled] [Clear all fields] : clear all fields [] : No [FreeTextEntry2] : MD Rx Verified [Hard Copy] [FreeTextEntry4] : 100 [FreeTextEntry6] : 16 : 38 [FreeTextEntry8] : 16 : 48 [de-identified] : 17 : 18 [de-identified] : 2391 [de-identified] : 9120 [de-identified] : 1751 [de-identified] : 1824 [de-identified] : 1838 [de-identified] : 120 MINUTES

## 2022-07-13 ENCOUNTER — APPOINTMENT (OUTPATIENT)
Dept: HYPERBARIC MEDICINE | Facility: HOSPITAL | Age: 67
End: 2022-07-13

## 2022-07-13 ENCOUNTER — OUTPATIENT (OUTPATIENT)
Dept: OUTPATIENT SERVICES | Facility: HOSPITAL | Age: 67
LOS: 1 days | Discharge: HOME | End: 2022-07-13
Payer: MEDICARE

## 2022-07-13 VITALS
HEART RATE: 65 BPM | RESPIRATION RATE: 18 BRPM | SYSTOLIC BLOOD PRESSURE: 150 MMHG | DIASTOLIC BLOOD PRESSURE: 81 MMHG | TEMPERATURE: 97.6 F | OXYGEN SATURATION: 100 %

## 2022-07-13 VITALS
DIASTOLIC BLOOD PRESSURE: 88 MMHG | HEART RATE: 66 BPM | SYSTOLIC BLOOD PRESSURE: 154 MMHG | OXYGEN SATURATION: 97 % | RESPIRATION RATE: 18 BRPM | TEMPERATURE: 97.7 F

## 2022-07-13 DIAGNOSIS — M27.2 INFLAMMATORY CONDITIONS OF JAWS: ICD-10-CM

## 2022-07-13 DIAGNOSIS — Z98.52 VASECTOMY STATUS: Chronic | ICD-10-CM

## 2022-07-13 DIAGNOSIS — Z85.810 PERSONAL HISTORY OF MALIGNANT NEOPLASM OF TONGUE: ICD-10-CM

## 2022-07-13 PROCEDURE — G0277: CPT

## 2022-07-13 PROCEDURE — U0003: CPT

## 2022-07-13 PROCEDURE — 99183 HYPERBARIC OXYGEN THERAPY: CPT

## 2022-07-13 PROCEDURE — 82962 GLUCOSE BLOOD TEST: CPT

## 2022-07-13 PROCEDURE — U0005: CPT

## 2022-07-13 NOTE — ADDENDUM
[FreeTextEntry1] : Patients Order Verified Prior to Treatment. \par Patients Secondary Contraindication Examination Check Preformed by CHT. \par Patients Contraindication List and Pre-Check List, Verified and Signed by Technician and CHT Prior to Patients Treatment. \par Prior to hbo, pts bg was below safe parameters for HBOT. Pt was provided with 2 juices in attempt to raise bg. After retest pts bg was within safe parameters for hbot. \par \par Patient descended to 2.4 PEACE @ 2.2 PSI/MIN without incident in chamber #2.\par Patient resting comfortably @ depth, equal chest rise observed throughout treatment.\par PT TOLERATED AIR BREAKS WELL\par PT ASCENDED FROM 2.4 PEACE @ 2.2 PSI/MIN WITHOUT INCIDENT\par PT TOLERATED TX WELL\par \par \par \par \par

## 2022-07-13 NOTE — PROCEDURE
[Outpatient] : Outpatient [Ambulatory] : Patient is ambulatory. [THIS CHAMBER HAS BEEN CLEANED / DISINFECTED] : This chamber has been cleaned / disinfected according to local and hospital policy and procedure prior to this treatment. [Patient demonstrated and verbalized proper technique for using air break mask] : Patient demonstrated and verbalized proper technique for using air break mask [Patient educated on the risks of SMOKING prior to HBOT with understanding] : Patient educated on the risks of SMOKING prior to HBOT with understanding [Patient educated on the risks of CONSUMING ALCOHOL prior to HBOT with understanding] : Patient educated on the risks of CONSUMING ALCOHOL prior to HBOT with understanding [100% Cotton] : 100% cotton [Empty all pockets] : empty all pockets [No hair oils, wigs, hairpieces, pins] : no hair oils, wigs, hairpieces, pins  [Pre tx medications] : pre tx medications  [No make-up, creams] : no make-up, creams  [No jewelry] : no jewelry  [No matches, cigarettes, lighters] : no matches, cigarettes, lighters  [Hearing aid removed] : hearing aid removed [Dentures removed] : dentures removed [Ground bracelet on pt's wrist] : ground bracelet on pt's wrist  [Contacts removed] : contacts removed  [Remove nail polish] : remove nail polish  [No reading material] : no reading material  [Bra, undergarments removed] : bra, undergarments removed  [No contraindicated dressings] : no contraindicated dressings [Ground Wire - VISUAL Verification - Intact/Free of Obstruction] : Ground Wire - VISUAL Verification - Intact/Free of Obstruction  [Ground Continuity - Verified < 1 ohm w/ Wrist Strap Colton] : Ground Continuity - Verified < 1 ohm w/ Wrist Strap Colton [Number: ___] : Number: [unfilled] [Diagnosis: ___] : Diagnosis: [unfilled] [____] : Post-Dive: Time - [unfilled] [___] : Post-Dive: Value - [unfilled] mg/dL [Clear all fields] : clear all fields [] : No [FreeTextEntry4] : 100 [FreeTextEntry6] : 8900 [FreeTextEntry8] : 3229 [de-identified] : 9322 [de-identified] : 5541 [de-identified] : 2950 [de-identified] : 2786 [de-identified] : 3203 [de-identified] : 2577 [de-identified] : 120 MINUTES

## 2022-07-13 NOTE — ADDENDUM
[FreeTextEntry1] : PT ARRIVED A&OX3 \par ALL VITALS WITHIN PARAMETERS FOR HBOT WITH THE EXCEPTION OF BGL\par MD NOTIFIED\par PT GIVEN 16 GRAMS OF SUGAR VIA 2 JUICE, AS PER MD, NO RETEST REQUIRED\par PT CLEARED TO BEGIN HBOT\par PT PRE-DIVE CHECKLIST SIGNED AND WITNESSED BY CHT\par PT DESCENDED TO 2.4 PEACE @ 2.2 PSI/MIN IN CHAMBER #3  WITHOUT INCIDENT\par PT RESTING AT TX DEPTH WITH VISIBLE CHEST RISE AND FALL OBSERVED CHAMBER SIDE\par PT ASCENDED TO SURFACE PRESSURE WITHOUT INCIDENT\par \par

## 2022-07-14 ENCOUNTER — OUTPATIENT (OUTPATIENT)
Dept: OUTPATIENT SERVICES | Facility: HOSPITAL | Age: 67
LOS: 1 days | Discharge: HOME | End: 2022-07-14
Payer: MEDICARE

## 2022-07-14 ENCOUNTER — APPOINTMENT (OUTPATIENT)
Dept: HYPERBARIC MEDICINE | Facility: HOSPITAL | Age: 67
End: 2022-07-14

## 2022-07-14 VITALS
TEMPERATURE: 97.2 F | DIASTOLIC BLOOD PRESSURE: 78 MMHG | SYSTOLIC BLOOD PRESSURE: 129 MMHG | HEART RATE: 61 BPM | OXYGEN SATURATION: 99 % | RESPIRATION RATE: 18 BRPM

## 2022-07-14 VITALS
HEART RATE: 66 BPM | OXYGEN SATURATION: 99 % | TEMPERATURE: 98.9 F | RESPIRATION RATE: 16 BRPM | SYSTOLIC BLOOD PRESSURE: 118 MMHG | DIASTOLIC BLOOD PRESSURE: 76 MMHG

## 2022-07-14 DIAGNOSIS — M27.2 INFLAMMATORY CONDITIONS OF JAWS: ICD-10-CM

## 2022-07-14 DIAGNOSIS — Z98.52 VASECTOMY STATUS: Chronic | ICD-10-CM

## 2022-07-14 LAB — SARS-COV-2 RNA SPEC QL NAA+PROBE: SIGNIFICANT CHANGE UP

## 2022-07-14 PROCEDURE — G0277: CPT

## 2022-07-14 PROCEDURE — 99183 HYPERBARIC OXYGEN THERAPY: CPT

## 2022-07-14 PROCEDURE — 82962 GLUCOSE BLOOD TEST: CPT

## 2022-07-14 NOTE — ADDENDUM
[FreeTextEntry1] : PT ARRIVED A&OX3 \par ALL VITALS WITHIN PARAMETERS FOR HBOT\par PT PRE-DIVE CHECKLIST SIGNED AND WITNESSED BY CHT\par PT DESCENDED TO 2.4 PEACE @ 2.2 PSI/MIN IN CHAMBER #2  WITHOUT INCIDENT\par PT RESTING AT TX DEPTH WITH VISIBLE CHEST RISE AND FALL OBSERVED CHAMBER SIDE\par PT TOLERATED AIR BREAKS WELL\par PT ASCENDED FROM 2.4 PEACE @ 2.2 PSI/MIN WITHOUT INCIDENT\par PT TOLERATED TX WELL\par

## 2022-07-14 NOTE — PROCEDURE
[Outpatient] : Outpatient [Ambulatory] : Patient is ambulatory. [THIS CHAMBER HAS BEEN CLEANED / DISINFECTED] : This chamber has been cleaned / disinfected according to local and hospital policy and procedure prior to this treatment. [Patient demonstrated and verbalized proper technique for using air break mask] : Patient demonstrated and verbalized proper technique for using air break mask [Patient educated on the risks of SMOKING prior to HBOT with understanding] : Patient educated on the risks of SMOKING prior to HBOT with understanding [Patient educated on the risks of CONSUMING ALCOHOL prior to HBOT with understanding] : Patient educated on the risks of CONSUMING ALCOHOL prior to HBOT with understanding [100% Cotton] : 100% cotton [Empty all pockets] : empty all pockets [No hair oils, wigs, hairpieces, pins] : no hair oils, wigs, hairpieces, pins  [Pre tx medications] : pre tx medications  [No make-up, creams] : no make-up, creams  [No jewelry] : no jewelry  [No matches, cigarettes, lighters] : no matches, cigarettes, lighters  [Hearing aid removed] : hearing aid removed [Dentures removed] : dentures removed [Ground bracelet on pt's wrist] : ground bracelet on pt's wrist  [Contacts removed] : contacts removed  [Remove nail polish] : remove nail polish  [No reading material] : no reading material  [Bra, undergarments removed] : bra, undergarments removed  [No contraindicated dressings] : no contraindicated dressings [Ground Wire - VISUAL Verification - Intact/Free of Obstruction] : Ground Wire - VISUAL Verification - Intact/Free of Obstruction  [Ground Continuity - Verified < 1 ohm w/ Wrist Strap Colton] : Ground Continuity - Verified < 1 ohm w/ Wrist Strap Colton [Number: ___] : Number: [unfilled] [Diagnosis: ___] : Diagnosis: [unfilled] [____] : Post-Dive: Time - [unfilled] [___] : Post-Dive: Value - [unfilled] mg/dL [Clear all fields] : clear all fields [] : No [FreeTextEntry4] : 100 [FreeTextEntry6] : 1235 [FreeTextEntry8] : 0534 [de-identified] : 8173 [de-identified] : 0259 [de-identified] : 5507 [de-identified] : 1810 [de-identified] : 1841 [de-identified] : 1859 [de-identified] : 120 MINUTES

## 2022-07-15 ENCOUNTER — APPOINTMENT (OUTPATIENT)
Dept: HYPERBARIC MEDICINE | Facility: HOSPITAL | Age: 67
End: 2022-07-15

## 2022-07-15 ENCOUNTER — OUTPATIENT (OUTPATIENT)
Dept: OUTPATIENT SERVICES | Facility: HOSPITAL | Age: 67
LOS: 1 days | Discharge: HOME | End: 2022-07-15
Payer: MEDICARE

## 2022-07-15 VITALS
RESPIRATION RATE: 18 BRPM | OXYGEN SATURATION: 100 % | TEMPERATURE: 97.2 F | SYSTOLIC BLOOD PRESSURE: 156 MMHG | HEART RATE: 52 BPM | DIASTOLIC BLOOD PRESSURE: 81 MMHG

## 2022-07-15 VITALS
OXYGEN SATURATION: 99 % | TEMPERATURE: 97.2 F | SYSTOLIC BLOOD PRESSURE: 168 MMHG | DIASTOLIC BLOOD PRESSURE: 71 MMHG | RESPIRATION RATE: 20 BRPM | HEART RATE: 53 BPM

## 2022-07-15 DIAGNOSIS — M27.2 INFLAMMATORY CONDITIONS OF JAWS: ICD-10-CM

## 2022-07-15 DIAGNOSIS — Z98.52 VASECTOMY STATUS: Chronic | ICD-10-CM

## 2022-07-15 DIAGNOSIS — Z85.810 PERSONAL HISTORY OF MALIGNANT NEOPLASM OF TONGUE: ICD-10-CM

## 2022-07-15 PROCEDURE — G0277: CPT

## 2022-07-15 PROCEDURE — 99183 HYPERBARIC OXYGEN THERAPY: CPT

## 2022-07-15 PROCEDURE — 82962 GLUCOSE BLOOD TEST: CPT

## 2022-07-15 NOTE — ADDENDUM
[FreeTextEntry1] : PT ARRIVED A&OX3 \par ALL VITALS WITHIN PARAMETERS FOR HBOT\par PT PRE-DIVE CHECKLIST SIGNED AND WITNESSED BY CHT \par PT DESCENDED TO 2.4 PEACE @ 2.2 PSI/MIN IN CHAMBER #1  WITHOUT INCIDENT\par PT RESTING AT TX DEPTH WITH VISIBLE CHEST RISE AND FALL OBSERVED CHAMBER SIDE. \par Pt ascended from 2.4 PEACE @ 2.2 PSI/min without incident. \par Pt tolerated tx well.\par \par

## 2022-07-16 DIAGNOSIS — M27.2 INFLAMMATORY CONDITIONS OF JAWS: ICD-10-CM

## 2022-07-16 DIAGNOSIS — Z85.810 PERSONAL HISTORY OF MALIGNANT NEOPLASM OF TONGUE: ICD-10-CM

## 2022-07-18 ENCOUNTER — OUTPATIENT (OUTPATIENT)
Dept: OUTPATIENT SERVICES | Facility: HOSPITAL | Age: 67
LOS: 1 days | Discharge: HOME | End: 2022-07-18
Payer: MEDICARE

## 2022-07-18 ENCOUNTER — APPOINTMENT (OUTPATIENT)
Dept: HYPERBARIC MEDICINE | Facility: HOSPITAL | Age: 67
End: 2022-07-18

## 2022-07-18 VITALS
RESPIRATION RATE: 20 BRPM | TEMPERATURE: 97.2 F | HEART RATE: 60 BPM | DIASTOLIC BLOOD PRESSURE: 94 MMHG | SYSTOLIC BLOOD PRESSURE: 141 MMHG | OXYGEN SATURATION: 98 %

## 2022-07-18 VITALS
TEMPERATURE: 97.4 F | DIASTOLIC BLOOD PRESSURE: 82 MMHG | RESPIRATION RATE: 18 BRPM | HEART RATE: 98 BPM | OXYGEN SATURATION: 100 % | SYSTOLIC BLOOD PRESSURE: 151 MMHG

## 2022-07-18 DIAGNOSIS — M27.2 INFLAMMATORY CONDITIONS OF JAWS: ICD-10-CM

## 2022-07-18 DIAGNOSIS — Z85.810 PERSONAL HISTORY OF MALIGNANT NEOPLASM OF TONGUE: ICD-10-CM

## 2022-07-18 DIAGNOSIS — Z98.52 VASECTOMY STATUS: Chronic | ICD-10-CM

## 2022-07-18 PROCEDURE — G0277: CPT

## 2022-07-18 PROCEDURE — 82962 GLUCOSE BLOOD TEST: CPT

## 2022-07-18 PROCEDURE — 99183 HYPERBARIC OXYGEN THERAPY: CPT

## 2022-07-19 ENCOUNTER — NON-APPOINTMENT (OUTPATIENT)
Age: 67
End: 2022-07-19

## 2022-07-19 ENCOUNTER — APPOINTMENT (OUTPATIENT)
Dept: HYPERBARIC MEDICINE | Facility: HOSPITAL | Age: 67
End: 2022-07-19

## 2022-07-19 NOTE — ADDENDUM
[FreeTextEntry1] : PT ARRIVED AMBULATORY A&OX4.\par ALL VITALS WITHIN PARAMETERS FOR HBOT.\par PT DESCENT TO RX TX DEPTH IN CHAMBER 2 WAS WITHOUT INCIDENT.\par PT RESTING AT DEPTH, CHEST RISE AND FALL OBSERVED\par PT RECEIVED MED AIR BREAKS\par PT ASCENDED \par PT TOLERATED TX WITHOUT INCIDENT\par

## 2022-07-20 ENCOUNTER — OUTPATIENT (OUTPATIENT)
Dept: OUTPATIENT SERVICES | Facility: HOSPITAL | Age: 67
LOS: 1 days | Discharge: HOME | End: 2022-07-20
Payer: MEDICARE

## 2022-07-20 ENCOUNTER — APPOINTMENT (OUTPATIENT)
Dept: HYPERBARIC MEDICINE | Facility: HOSPITAL | Age: 67
End: 2022-07-20

## 2022-07-20 VITALS
DIASTOLIC BLOOD PRESSURE: 78 MMHG | RESPIRATION RATE: 18 BRPM | TEMPERATURE: 98 F | SYSTOLIC BLOOD PRESSURE: 113 MMHG | OXYGEN SATURATION: 99 % | HEART RATE: 55 BPM

## 2022-07-20 VITALS
HEART RATE: 76 BPM | OXYGEN SATURATION: 100 % | TEMPERATURE: 76 F | SYSTOLIC BLOOD PRESSURE: 98 MMHG | DIASTOLIC BLOOD PRESSURE: 70 MMHG | RESPIRATION RATE: 18 BRPM

## 2022-07-20 VITALS
HEART RATE: 76 BPM | TEMPERATURE: 98 F | OXYGEN SATURATION: 100 % | RESPIRATION RATE: 18 BRPM | DIASTOLIC BLOOD PRESSURE: 76 MMHG | SYSTOLIC BLOOD PRESSURE: 98 MMHG

## 2022-07-20 DIAGNOSIS — Z98.52 VASECTOMY STATUS: Chronic | ICD-10-CM

## 2022-07-20 DIAGNOSIS — M27.2 INFLAMMATORY CONDITIONS OF JAWS: ICD-10-CM

## 2022-07-20 LAB — SARS-COV-2 RNA SPEC QL NAA+PROBE: SIGNIFICANT CHANGE UP

## 2022-07-20 PROCEDURE — G0277: CPT

## 2022-07-20 PROCEDURE — 82962 GLUCOSE BLOOD TEST: CPT

## 2022-07-20 PROCEDURE — 99183 HYPERBARIC OXYGEN THERAPY: CPT

## 2022-07-20 PROCEDURE — U0003: CPT

## 2022-07-20 PROCEDURE — U0005: CPT

## 2022-07-20 NOTE — PROCEDURE
[____] : Post-Dive: Time - [unfilled] [] : No [FreeTextEntry4] : 100 [FreeTextEntry6] : 5001 [[facility]] : 6085 [de-identified] : 5212 [de-identified] : 8720 [de-identified] : 3430 [de-identified] : 2839 [de-identified] : 1826 [de-identified] : 1834 [de-identified] : 120 min. [Outpatient] : Outpatient [Ambulatory] : Patient is ambulatory. [THIS CHAMBER HAS BEEN CLEANED / DISINFECTED] : This chamber has been cleaned / disinfected according to local and hospital policy and procedure prior to this treatment. [___] : Pre-Dive: Value - [unfilled] mg/dL [Patient demonstrated and verbalized proper technique for using air break mask] : Patient demonstrated and verbalized proper technique for using air break mask [Patient educated on the risks of SMOKING prior to HBOT with understanding] : Patient educated on the risks of SMOKING prior to HBOT with understanding [Patient educated on the risks of CONSUMING ALCOHOL prior to HBOT with understanding] : Patient educated on the risks of CONSUMING ALCOHOL prior to HBOT with understanding [100% Cotton] : 100% cotton [Empty all pockets] : empty all pockets [No hair oils, wigs, hairpieces, pins] : no hair oils, wigs, hairpieces, pins  [Pre tx medications] : pre tx medications  [No make-up, creams] : no make-up, creams  [No jewelry] : no jewelry  [No matches, cigarettes, lighters] : no matches, cigarettes, lighters  [Hearing aid removed] : hearing aid removed [Dentures removed] : dentures removed [Ground bracelet on pt's wrist] : ground bracelet on pt's wrist  [Contacts removed] : contacts removed  [Remove nail polish] : remove nail polish  [No reading material] : no reading material  [Bra, undergarments removed] : bra, undergarments removed  [No contraindicated dressings] : no contraindicated dressings [Ground Wire - VISUAL Verification - Intact/Free of Obstruction] : Ground Wire - VISUAL Verification - Intact/Free of Obstruction  [Ground Continuity - Verified < 1 ohm w/ Wrist Strap Colton] : Ground Continuity - Verified < 1 ohm w/ Wrist Strap Colton [Clear all fields] : clear all fields [Number: ___] : Number: [unfilled] [Diagnosis: ___] : Diagnosis: [unfilled]

## 2022-07-21 ENCOUNTER — APPOINTMENT (OUTPATIENT)
Dept: HYPERBARIC MEDICINE | Facility: HOSPITAL | Age: 67
End: 2022-07-21

## 2022-07-21 NOTE — ADDENDUM
[FreeTextEntry1] : Patients Order Verified Prior to Treatment. \par Patients Secondary Contraindication Examination Check Preformed by CHT. \par Patients Contraindication List and Pre-Check List, Verified and Signed by Technician and CHT Prior to Patients Treatment. \par Patients Legs Elevated via Double Leg Wedge to Offload Patients Wound.\par ALL VITALS WITHIN PARAMETERS FOR HBOT \par PT PRE-DIVE CHECKLIST SIGNED AND WITNESSED BY \par PT DESCENDED TO 2.4 PEACE @ 2.2 PSI/MIN IN CHAMBER # WITHOUT INCIDENT\par PT RESTING AT TX DEPTH WITH VISIBLE CHEST RISE AND FALL OBSERVED CHAMBER SIDE\par PT TOLERATED AIR BREAKS WELL\par PT ASCENDED FROM 2.4 PEACE @ 2.2 PSI/MIN WITHOUT INCIDENT\par PT TOLERATED TX WELL\par \par

## 2022-07-21 NOTE — PROCEDURE
[Outpatient] : Outpatient [Ambulatory] : Patient is ambulatory. [THIS CHAMBER HAS BEEN CLEANED / DISINFECTED] : This chamber has been cleaned / disinfected according to local and hospital policy and procedure prior to this treatment. [Patient demonstrated and verbalized proper technique for using air break mask] : Patient demonstrated and verbalized proper technique for using air break mask [Patient educated on the risks of SMOKING prior to HBOT with understanding] : Patient educated on the risks of SMOKING prior to HBOT with understanding [Patient educated on the risks of CONSUMING ALCOHOL prior to HBOT with understanding] : Patient educated on the risks of CONSUMING ALCOHOL prior to HBOT with understanding [100% Cotton] : 100% cotton [Empty all pockets] : empty all pockets [No hair oils, wigs, hairpieces, pins] : no hair oils, wigs, hairpieces, pins  [Pre tx medications] : pre tx medications  [No make-up, creams] : no make-up, creams  [No jewelry] : no jewelry  [No matches, cigarettes, lighters] : no matches, cigarettes, lighters  [Hearing aid removed] : hearing aid removed [Dentures removed] : dentures removed [Ground bracelet on pt's wrist] : ground bracelet on pt's wrist  [Contacts removed] : contacts removed  [Remove nail polish] : remove nail polish  [No reading material] : no reading material  [Bra, undergarments removed] : bra, undergarments removed  [No contraindicated dressings] : no contraindicated dressings [Ground Wire - VISUAL Verification - Intact/Free of Obstruction] : Ground Wire - VISUAL Verification - Intact/Free of Obstruction  [Ground Continuity - Verified < 1 ohm w/ Wrist Strap Colton] : Ground Continuity - Verified < 1 ohm w/ Wrist Strap Colton [Number: ___] : Number: [unfilled] [Diagnosis: ___] : Diagnosis: [unfilled] [____] : Post-Dive: Time - [unfilled] [___] : Post-Dive: Value - [unfilled] mg/dL [Clear all fields] : clear all fields [] : No [FreeTextEntry4] : 100 [FreeTextEntry6] : 6327 [FreeTextEntry8] : 4302 [de-identified] : 0796 [de-identified] : 9264 [de-identified] : 3248 [de-identified] : 6773 [de-identified] : 1312 [de-identified] : 9051 [de-identified] : 120 MINUTES

## 2022-07-22 ENCOUNTER — APPOINTMENT (OUTPATIENT)
Dept: HYPERBARIC MEDICINE | Facility: HOSPITAL | Age: 67
End: 2022-07-22

## 2022-07-22 ENCOUNTER — OUTPATIENT (OUTPATIENT)
Dept: OUTPATIENT SERVICES | Facility: HOSPITAL | Age: 67
LOS: 1 days | Discharge: HOME | End: 2022-07-22
Payer: MEDICARE

## 2022-07-22 VITALS
TEMPERATURE: 98.2 F | HEART RATE: 66 BPM | SYSTOLIC BLOOD PRESSURE: 110 MMHG | RESPIRATION RATE: 18 BRPM | OXYGEN SATURATION: 97 % | DIASTOLIC BLOOD PRESSURE: 74 MMHG

## 2022-07-22 VITALS
RESPIRATION RATE: 18 BRPM | OXYGEN SATURATION: 98 % | HEART RATE: 52 BPM | DIASTOLIC BLOOD PRESSURE: 89 MMHG | SYSTOLIC BLOOD PRESSURE: 148 MMHG | TEMPERATURE: 98 F

## 2022-07-22 DIAGNOSIS — Z98.52 VASECTOMY STATUS: Chronic | ICD-10-CM

## 2022-07-22 DIAGNOSIS — M27.2 INFLAMMATORY CONDITIONS OF JAWS: ICD-10-CM

## 2022-07-22 PROCEDURE — 99183 HYPERBARIC OXYGEN THERAPY: CPT

## 2022-07-22 PROCEDURE — G0277: CPT

## 2022-07-22 NOTE — ADDENDUM
[FreeTextEntry1] : TRANSCRIPTION ONLY\par \par PT ARRIVED AMBULATORY A&OX4.\par ALL VITALS WITHIN PARAMETERS FOR HBOT.\par PT DESCENT TO RX TX DEPTH IN CHAMBER 1 WAS WITHOUT INCIDENT.\par PT RESTING AT DEPTH, CHEST RISE AND FALL OBSERVED.\par PT TOLERATED AIR BREAK WELL.\par TRANSFER OF CARE TO [facility] BEFORE FINAL AIR BREAK.\par The pt. was observed with visible chest motion and without incident for the duration of observed HBOT.\par The pt. was administered intermittent med. air over course of HBOT without incident.\par The pt. ascended from tx. depth to surface without incident\par The pt. tolerated HBOT without incident.\par The pt. received routine COVID-19 PCR SWAB post-HBOT (@ 17:59) and tolerated same without incident.\par The pt. exited HBOT suite ambulatory and stable.

## 2022-07-22 NOTE — PROCEDURE
[____] : Post-Dive: Time - [unfilled] [___] : Post-Dive: Value - [unfilled] mg/dL [Clear all fields] : clear all fields [] : No [FreeTextEntry4] : 100 [FreeTextEntry6] : 4867 [FreeTextEntry8] : 9763 [de-identified] : 7341 [de-identified] : 1447 [de-identified] : 17 : 11 [de-identified] : 17 : 16 [de-identified] : 17 : 46 [de-identified] : 17 : 56 [de-identified] : 120 min.

## 2022-07-23 ENCOUNTER — OUTPATIENT (OUTPATIENT)
Dept: OUTPATIENT SERVICES | Facility: HOSPITAL | Age: 67
LOS: 1 days | Discharge: HOME | End: 2022-07-23
Payer: MEDICARE

## 2022-07-23 ENCOUNTER — APPOINTMENT (OUTPATIENT)
Dept: HYPERBARIC MEDICINE | Facility: HOSPITAL | Age: 67
End: 2022-07-23

## 2022-07-23 VITALS
OXYGEN SATURATION: 99 % | RESPIRATION RATE: 16 BRPM | DIASTOLIC BLOOD PRESSURE: 68 MMHG | SYSTOLIC BLOOD PRESSURE: 170 MMHG | TEMPERATURE: 98.3 F | HEART RATE: 54 BPM

## 2022-07-23 VITALS
RESPIRATION RATE: 16 BRPM | DIASTOLIC BLOOD PRESSURE: 88 MMHG | SYSTOLIC BLOOD PRESSURE: 154 MMHG | TEMPERATURE: 98.6 F | HEART RATE: 78 BPM | OXYGEN SATURATION: 98 %

## 2022-07-23 DIAGNOSIS — M27.2 INFLAMMATORY CONDITIONS OF JAWS: ICD-10-CM

## 2022-07-23 DIAGNOSIS — Z85.810 PERSONAL HISTORY OF MALIGNANT NEOPLASM OF TONGUE: ICD-10-CM

## 2022-07-23 DIAGNOSIS — Z98.52 VASECTOMY STATUS: Chronic | ICD-10-CM

## 2022-07-23 DIAGNOSIS — Z20.822 CONTACT WITH AND (SUSPECTED) EXPOSURE TO COVID-19: ICD-10-CM

## 2022-07-23 PROCEDURE — 82962 GLUCOSE BLOOD TEST: CPT

## 2022-07-23 PROCEDURE — 99183 HYPERBARIC OXYGEN THERAPY: CPT

## 2022-07-23 PROCEDURE — G0277: CPT

## 2022-07-23 NOTE — PROCEDURE
[Outpatient] : Outpatient [Ambulatory] : Patient is ambulatory. [THIS CHAMBER HAS BEEN CLEANED / DISINFECTED] : This chamber has been cleaned / disinfected according to local and hospital policy and procedure prior to this treatment. [____] : Post-Dive: Time - [unfilled] [___] : Post-Dive: Value - [unfilled] mg/dL [Patient demonstrated and verbalized proper technique for using air break mask] : Patient demonstrated and verbalized proper technique for using air break mask [Patient educated on the risks of SMOKING prior to HBOT with understanding] : Patient educated on the risks of SMOKING prior to HBOT with understanding [Patient educated on the risks of CONSUMING ALCOHOL prior to HBOT with understanding] : Patient educated on the risks of CONSUMING ALCOHOL prior to HBOT with understanding [100% Cotton] : 100% cotton [Empty all pockets] : empty all pockets [No hair oils, wigs, hairpieces, pins] : no hair oils, wigs, hairpieces, pins  [Pre tx medications] : pre tx medications  [No make-up, creams] : no make-up, creams  [No jewelry] : no jewelry  [No matches, cigarettes, lighters] : no matches, cigarettes, lighters  [Hearing aid removed] : hearing aid removed [Dentures removed] : dentures removed [Ground bracelet on pt's wrist] : ground bracelet on pt's wrist  [Contacts removed] : contacts removed  [Remove nail polish] : remove nail polish  [No reading material] : no reading material  [Bra, undergarments removed] : bra, undergarments removed  [No contraindicated dressings] : no contraindicated dressings [Ground Wire - VISUAL Verification - Intact/Free of Obstruction] : Ground Wire - VISUAL Verification - Intact/Free of Obstruction  [Ground Continuity - Verified < 1 ohm w/ Wrist Strap Colton] : Ground Continuity - Verified < 1 ohm w/ Wrist Strap Colton [Clear all fields] : clear all fields [Number: ___] : Number: [unfilled] [Diagnosis: ___] : Diagnosis: [unfilled] [] : No [FreeTextEntry4] : 100 Min [FreeTextEntry6] : 7536 [FreeTextEntry8] : 1640 [de-identified] : 9274 [de-identified] : 4571 [de-identified] : 4224 [de-identified] : 0180 [de-identified] : 1822 [de-identified] : 1837 [de-identified] : 120 Min

## 2022-07-23 NOTE — ADDENDUM
[FreeTextEntry1] : PT ARRIVED A&OX3 \par ALL VITALS WITHIN PARAMETERS FOR HBOT\par PT PRE-DIVE CHECKLIST SIGNED AND WITNESSED BY CHT\par PT DESCENDED TO 2.4 PEACE @ 2.2 PSI/MIN IN CHAMBER #2  WITHOUT INCIDENT\par PT RESTING AT TX DEPTH WITH VISIBLE CHEST RISE AND FALL OBSERVED CHAMBER SIDE\par PT ASCENDED TO SURFACE PRESSURE WITHOUT INCIDENT

## 2022-07-23 NOTE — ADDENDUM
[FreeTextEntry1] : Pt descended to 2.4 PEACE @ 2.2 PSI/min without incident in chamber #2\par Pt resting @ depth with chest rise and fall observed throughout tx. \par Pt tolerated air breaks well. \par Pt ascended from 2.4 PEACE @ 2.2 PSI/min without incident. \par Pt tolerated tx well. \par \par \par

## 2022-07-23 NOTE — PROCEDURE
[Outpatient] : Outpatient [Ambulatory] : Patient is ambulatory. [THIS CHAMBER HAS BEEN CLEANED / DISINFECTED] : This chamber has been cleaned / disinfected according to local and hospital policy and procedure prior to this treatment. [____] : Post-Dive: Time - [unfilled] [Patient demonstrated and verbalized proper technique for using air break mask] : Patient demonstrated and verbalized proper technique for using air break mask [Patient educated on the risks of SMOKING prior to HBOT with understanding] : Patient educated on the risks of SMOKING prior to HBOT with understanding [Patient educated on the risks of CONSUMING ALCOHOL prior to HBOT with understanding] : Patient educated on the risks of CONSUMING ALCOHOL prior to HBOT with understanding [100% Cotton] : 100% cotton [Empty all pockets] : empty all pockets [No hair oils, wigs, hairpieces, pins] : no hair oils, wigs, hairpieces, pins  [Pre tx medications] : pre tx medications  [No make-up, creams] : no make-up, creams  [No jewelry] : no jewelry  [Hearing aid removed] : hearing aid removed [No matches, cigarettes, lighters] : no matches, cigarettes, lighters  [Dentures removed] : dentures removed [Ground bracelet on pt's wrist] : ground bracelet on pt's wrist  [Contacts removed] : contacts removed  [Remove nail polish] : remove nail polish  [No reading material] : no reading material  [Bra, undergarments removed] : bra, undergarments removed  [No contraindicated dressings] : no contraindicated dressings [Ground Wire - VISUAL Verification - Intact/Free of Obstruction] : Ground Wire - VISUAL Verification - Intact/Free of Obstruction  [Ground Continuity - Verified < 1 ohm w/ Wrist Strap Colton] : Ground Continuity - Verified < 1 ohm w/ Wrist Strap Colton [Diagnosis: ___] : Diagnosis: [unfilled] [___] : Pre-Dive: Value - [unfilled] mg/dL [Number: ___] : Number: [unfilled] [Clear all fields] : clear all fields [] : No [FreeTextEntry2] : 2.4 [FreeTextEntry4] : 100 mins [FreeTextEntry6] : 0682 [FreeTextEntry8] : 2572 [de-identified] : 3762 [de-identified] : 4932 [de-identified] : 5624 [de-identified] : 3088 [de-identified] : 7408 [de-identified] : 1008 [de-identified] : 120 mins

## 2022-07-25 ENCOUNTER — OUTPATIENT (OUTPATIENT)
Dept: OUTPATIENT SERVICES | Facility: HOSPITAL | Age: 67
LOS: 1 days | Discharge: HOME | End: 2022-07-25
Payer: MEDICARE

## 2022-07-25 ENCOUNTER — APPOINTMENT (OUTPATIENT)
Dept: HYPERBARIC MEDICINE | Facility: HOSPITAL | Age: 67
End: 2022-07-25

## 2022-07-25 VITALS
TEMPERATURE: 98.2 F | DIASTOLIC BLOOD PRESSURE: 86 MMHG | OXYGEN SATURATION: 99 % | HEART RATE: 56 BPM | RESPIRATION RATE: 20 BRPM | SYSTOLIC BLOOD PRESSURE: 149 MMHG

## 2022-07-25 VITALS
SYSTOLIC BLOOD PRESSURE: 145 MMHG | RESPIRATION RATE: 18 BRPM | TEMPERATURE: 97.4 F | HEART RATE: 70 BPM | DIASTOLIC BLOOD PRESSURE: 82 MMHG | OXYGEN SATURATION: 97 %

## 2022-07-25 DIAGNOSIS — Z98.52 VASECTOMY STATUS: Chronic | ICD-10-CM

## 2022-07-25 DIAGNOSIS — M27.2 INFLAMMATORY CONDITIONS OF JAWS: ICD-10-CM

## 2022-07-25 DIAGNOSIS — Z85.810 PERSONAL HISTORY OF MALIGNANT NEOPLASM OF TONGUE: ICD-10-CM

## 2022-07-25 PROCEDURE — G0277: CPT

## 2022-07-25 PROCEDURE — 99183 HYPERBARIC OXYGEN THERAPY: CPT

## 2022-07-25 PROCEDURE — 82962 GLUCOSE BLOOD TEST: CPT

## 2022-07-26 ENCOUNTER — OUTPATIENT (OUTPATIENT)
Dept: OUTPATIENT SERVICES | Facility: HOSPITAL | Age: 67
LOS: 1 days | Discharge: HOME | End: 2022-07-26
Payer: MEDICARE

## 2022-07-26 ENCOUNTER — APPOINTMENT (OUTPATIENT)
Dept: HYPERBARIC MEDICINE | Facility: HOSPITAL | Age: 67
End: 2022-07-26

## 2022-07-26 VITALS
SYSTOLIC BLOOD PRESSURE: 112 MMHG | TEMPERATURE: 98.2 F | RESPIRATION RATE: 20 BRPM | DIASTOLIC BLOOD PRESSURE: 78 MMHG | OXYGEN SATURATION: 98 % | HEART RATE: 67 BPM

## 2022-07-26 VITALS
DIASTOLIC BLOOD PRESSURE: 89 MMHG | OXYGEN SATURATION: 100 % | SYSTOLIC BLOOD PRESSURE: 139 MMHG | TEMPERATURE: 97.5 F | HEART RATE: 59 BPM | RESPIRATION RATE: 18 BRPM

## 2022-07-26 DIAGNOSIS — M27.2 INFLAMMATORY CONDITIONS OF JAWS: ICD-10-CM

## 2022-07-26 DIAGNOSIS — Z98.52 VASECTOMY STATUS: Chronic | ICD-10-CM

## 2022-07-26 DIAGNOSIS — Z85.810 PERSONAL HISTORY OF MALIGNANT NEOPLASM OF TONGUE: ICD-10-CM

## 2022-07-26 PROCEDURE — 99183 HYPERBARIC OXYGEN THERAPY: CPT

## 2022-07-26 PROCEDURE — 82962 GLUCOSE BLOOD TEST: CPT

## 2022-07-26 PROCEDURE — G0277: CPT

## 2022-07-26 NOTE — PROCEDURE
[Outpatient] : Outpatient [Ambulatory] : Patient is ambulatory. [THIS CHAMBER HAS BEEN CLEANED / DISINFECTED] : This chamber has been cleaned / disinfected according to local and hospital policy and procedure prior to this treatment. [Patient demonstrated and verbalized proper technique for using air break mask] : Patient demonstrated and verbalized proper technique for using air break mask [Patient educated on the risks of SMOKING prior to HBOT with understanding] : Patient educated on the risks of SMOKING prior to HBOT with understanding [Patient educated on the risks of CONSUMING ALCOHOL prior to HBOT with understanding] : Patient educated on the risks of CONSUMING ALCOHOL prior to HBOT with understanding [100% Cotton] : 100% cotton [Empty all pockets] : empty all pockets [No hair oils, wigs, hairpieces, pins] : no hair oils, wigs, hairpieces, pins  [Pre tx medications] : pre tx medications  [No make-up, creams] : no make-up, creams  [No jewelry] : no jewelry  [No matches, cigarettes, lighters] : no matches, cigarettes, lighters  [Hearing aid removed] : hearing aid removed [Dentures removed] : dentures removed [Ground bracelet on pt's wrist] : ground bracelet on pt's wrist  [Contacts removed] : contacts removed  [Remove nail polish] : remove nail polish  [No reading material] : no reading material  [Bra, undergarments removed] : bra, undergarments removed  [No contraindicated dressings] : no contraindicated dressings [Ground Wire - VISUAL Verification - Intact/Free of Obstruction] : Ground Wire - VISUAL Verification - Intact/Free of Obstruction  [Ground Continuity - Verified < 1 ohm w/ Wrist Strap Colton] : Ground Continuity - Verified < 1 ohm w/ Wrist Strap [place]ton [Number: ___] : Number: [unfilled] [Diagnosis: ___] : Diagnosis: [unfilled] [____] : Post-Dive: Time - [unfilled] [___] : Post-Dive: Value - [unfilled] mg/dL [Clear all fields] : clear all fields [] : No [FreeTextEntry4] : 100 [FreeTextEntry6] : 4314 [FreeTextEntry8] : 4652 [de-identified] : 8441 [de-identified] : 5637 [de-identified] : 8867 [de-identified] : 8596 [de-identified] : 4277 [de-identified] : 1007 [de-identified] : 120 MINUTES

## 2022-07-26 NOTE — PROCEDURE
[Outpatient] : Outpatient [Ambulatory] : Patient is ambulatory. [THIS CHAMBER HAS BEEN CLEANED / DISINFECTED] : This chamber has been cleaned / disinfected according to local and hospital policy and procedure prior to this treatment. [___] : Pre-Dive: Value - [unfilled] mg/dL [Patient demonstrated and verbalized proper technique for using air break mask] : Patient demonstrated and verbalized proper technique for using air break mask [Patient educated on the risks of SMOKING prior to HBOT with understanding] : Patient educated on the risks of SMOKING prior to HBOT with understanding [Patient educated on the risks of CONSUMING ALCOHOL prior to HBOT with understanding] : Patient educated on the risks of CONSUMING ALCOHOL prior to HBOT with understanding [100% Cotton] : 100% cotton [Empty all pockets] : empty all pockets [No hair oils, wigs, hairpieces, pins] : no hair oils, wigs, hairpieces, pins  [Pre tx medications] : pre tx medications  [No make-up, creams] : no make-up, creams  [No jewelry] : no jewelry  [No matches, cigarettes, lighters] : no matches, cigarettes, lighters  [Hearing aid removed] : hearing aid removed [Dentures removed] : dentures removed [Ground bracelet on pt's wrist] : ground bracelet on pt's wrist  [Contacts removed] : contacts removed  [Remove nail polish] : remove nail polish  [No reading material] : no reading material  [Bra, undergarments removed] : bra, undergarments removed  [No contraindicated dressings] : no contraindicated dressings [Ground Wire - VISUAL Verification - Intact/Free of Obstruction] : Ground Wire - VISUAL Verification - Intact/Free of Obstruction  [Ground Continuity - Verified < 1 ohm w/ Wrist Strap Colton] : Ground Continuity - Verified < 1 ohm w/ Wrist Strap Col[place] [Number: ___] : Number: [unfilled] [Diagnosis: ___] : Diagnosis: [unfilled] [____] : Post-Dive: Time - [unfilled] [Clear all fields] : clear all fields [] : No [FreeTextEntry4] : 100 MIN [FreeTextEntry6] : 5002 [FreeTextEntry8] : 3605 [de-identified] : 0466 [de-identified] : 5191 [de-identified] : 6821 [de-identified] : 7422 [de-identified] : 3122 [de-identified] : 1754 [de-identified] : 120 MIN

## 2022-07-26 NOTE — ADDENDUM
[FreeTextEntry1] : PT ARRIVED A&OX3 \par ALL VITALS WITHIN PARAMETERS FOR HBOT\par PT PRE-DIVE CHECKLIST SIGNED AND WITNESSED BY CHT\par PT DESCENDED TO 2.4 PEACE @ 2.2 PSI/MIN IN CHAMBER #2  WITHOUT INCIDENT\par PT RESTING AT TX DEPTH WITH VISIBLE CHEST RISE AND FALL OBSERVED CHAMBER SIDE.\par PT TOLERATED AIR BREAKS WELL\par PT ASCENT WAS WITHOUT INCIDENT. PT TOLERATED HBOT WELL.\par \par CHT RASHEEDA GONZALEZ 07/25/22\par

## 2022-07-26 NOTE — ADDENDUM
[FreeTextEntry1] : PTS PRE PROCEDURAL CHECKLIST WAS DONE BY CHT/CHRN AND HT SUCCESSFULLY PRE HBOT\par PT DESCENDED TO 2.4 PEACE @ 2.2 PSI/MIN WITHOUT INCIDENT IN CHAMBER #1\par PT RESTING AT TX DEPTH WITH VISIBLE CHEST RISE AND FALL OBSERVED CHAMBERSIDE \par PT TOLERATED AIR BREAKS WELL\par PT ASCENDED FROM 2.4 PEACE @ 2.2 PSI/MIN WITHOUT INCIDENT\par PT TOLERATED TX WELL\par

## 2022-07-27 ENCOUNTER — OUTPATIENT (OUTPATIENT)
Dept: OUTPATIENT SERVICES | Facility: HOSPITAL | Age: 67
LOS: 1 days | Discharge: HOME | End: 2022-07-27
Payer: MEDICARE

## 2022-07-27 ENCOUNTER — APPOINTMENT (OUTPATIENT)
Dept: HYPERBARIC MEDICINE | Facility: HOSPITAL | Age: 67
End: 2022-07-27

## 2022-07-27 VITALS
OXYGEN SATURATION: 97 % | TEMPERATURE: 97.8 F | HEART RATE: 73 BPM | SYSTOLIC BLOOD PRESSURE: 111 MMHG | DIASTOLIC BLOOD PRESSURE: 71 MMHG | RESPIRATION RATE: 20 BRPM

## 2022-07-27 VITALS
OXYGEN SATURATION: 98 % | RESPIRATION RATE: 18 BRPM | TEMPERATURE: 98.3 F | HEART RATE: 58 BPM | SYSTOLIC BLOOD PRESSURE: 142 MMHG | DIASTOLIC BLOOD PRESSURE: 69 MMHG

## 2022-07-27 DIAGNOSIS — Z85.810 PERSONAL HISTORY OF MALIGNANT NEOPLASM OF TONGUE: ICD-10-CM

## 2022-07-27 DIAGNOSIS — M27.2 INFLAMMATORY CONDITIONS OF JAWS: ICD-10-CM

## 2022-07-27 DIAGNOSIS — Z98.52 VASECTOMY STATUS: Chronic | ICD-10-CM

## 2022-07-27 PROCEDURE — G0277: CPT

## 2022-07-27 PROCEDURE — U0005: CPT

## 2022-07-27 PROCEDURE — U0003: CPT

## 2022-07-27 PROCEDURE — 99183 HYPERBARIC OXYGEN THERAPY: CPT

## 2022-07-27 PROCEDURE — 82962 GLUCOSE BLOOD TEST: CPT

## 2022-07-28 ENCOUNTER — OUTPATIENT (OUTPATIENT)
Dept: OUTPATIENT SERVICES | Facility: HOSPITAL | Age: 67
LOS: 1 days | Discharge: HOME | End: 2022-07-28
Payer: MEDICARE

## 2022-07-28 ENCOUNTER — APPOINTMENT (OUTPATIENT)
Dept: HYPERBARIC MEDICINE | Facility: HOSPITAL | Age: 67
End: 2022-07-28

## 2022-07-28 VITALS
RESPIRATION RATE: 18 BRPM | DIASTOLIC BLOOD PRESSURE: 86 MMHG | SYSTOLIC BLOOD PRESSURE: 151 MMHG | TEMPERATURE: 97.3 F | OXYGEN SATURATION: 98 % | HEART RATE: 55 BPM

## 2022-07-28 VITALS
SYSTOLIC BLOOD PRESSURE: 158 MMHG | DIASTOLIC BLOOD PRESSURE: 84 MMHG | OXYGEN SATURATION: 98 % | TEMPERATURE: 97.8 F | RESPIRATION RATE: 18 BRPM | HEART RATE: 60 BPM

## 2022-07-28 DIAGNOSIS — M27.2 INFLAMMATORY CONDITIONS OF JAWS: ICD-10-CM

## 2022-07-28 DIAGNOSIS — Z98.52 VASECTOMY STATUS: Chronic | ICD-10-CM

## 2022-07-28 LAB — SARS-COV-2 RNA SPEC QL NAA+PROBE: SIGNIFICANT CHANGE UP

## 2022-07-28 PROCEDURE — 82962 GLUCOSE BLOOD TEST: CPT

## 2022-07-28 PROCEDURE — G0277: CPT

## 2022-07-28 PROCEDURE — 99183 HYPERBARIC OXYGEN THERAPY: CPT

## 2022-07-28 NOTE — PROCEDURE
[Outpatient] : Outpatient [Ambulatory] : Patient is ambulatory. [THIS CHAMBER HAS BEEN CLEANED / DISINFECTED] : This chamber has been cleaned / disinfected according to local and hospital policy and procedure prior to this treatment. [Patient demonstrated and verbalized proper technique for using air break mask] : Patient demonstrated and verbalized proper technique for using air break mask [Patient educated on the risks of SMOKING prior to HBOT with understanding] : Patient educated on the risks of SMOKING prior to HBOT with understanding [Patient educated on the risks of CONSUMING ALCOHOL prior to HBOT with understanding] : Patient educated on the risks of CONSUMING ALCOHOL prior to HBOT with understanding [100% Cotton] : 100% cotton [Empty all pockets] : empty all pockets [No hair oils, wigs, hairpieces, pins] : no hair oils, wigs, hairpieces, pins  [Pre tx medications] : pre tx medications  [No make-up, creams] : no make-up, creams  [No jewelry] : no jewelry  [No matches, cigarettes, lighters] : no matches, cigarettes, lighters  [Hearing aid removed] : hearing aid removed [Dentures removed] : dentures removed [Ground bracelet on pt's wrist] : ground bracelet on pt's wrist  [Contacts removed] : contacts removed  [Remove nail polish] : remove nail polish  [No reading material] : no reading material  [Bra, undergarments removed] : bra, undergarments removed  [No contraindicated dressings] : no contraindicated dressings [Ground Wire - VISUAL Verification - Intact/Free of Obstruction] : Ground Wire - VISUAL Verification - Intact/Free of Obstruction  [Ground Continuity - Verified < 1 ohm w/ Wrist Strap Colton] : Ground Continuity - Verified < 1 ohm w/ Wrist Strap Colton [Number: ___] : Number: [unfilled] [Diagnosis: ___] : Diagnosis: [unfilled] [____] : Post-Dive: Time - [unfilled] [___] : Post-Dive: Value - [unfilled] mg/dL [Clear all fields] : clear all fields [] : No [FreeTextEntry2] : MD Rx Verified [Hard Copy] [FreeTextEntry4] : 100 [FreeTextEntry6] : 0951 [FreeTextEntry8] : 9139 [de-identified] : 16 : 49 [de-identified] : 16 : 54 [de-identified] : 17 : 24 [de-identified] : 17 : 29 [de-identified] : 17 : 59 [de-identified] : 18 : 09 [de-identified] : 120 min.

## 2022-07-28 NOTE — PROCEDURE
[Outpatient] : Outpatient [Ambulatory] : Patient is ambulatory. [THIS CHAMBER HAS BEEN CLEANED / DISINFECTED] : This chamber has been cleaned / disinfected according to local and hospital policy and procedure prior to this treatment. [Patient demonstrated and verbalized proper technique for using air break mask] : Patient demonstrated and verbalized proper technique for using air break mask [Patient educated on the risks of SMOKING prior to HBOT with understanding] : Patient educated on the risks of SMOKING prior to HBOT with understanding [Patient educated on the risks of CONSUMING ALCOHOL prior to HBOT with understanding] : Patient educated on the risks of CONSUMING ALCOHOL prior to HBOT with understanding [Number: ___] : Number: [unfilled] [Diagnosis: ___] : Diagnosis: [unfilled] [100% Cotton] : 100% cotton [Empty all pockets] : empty all pockets [No hair oils, wigs, hairpieces, pins] : no hair oils, wigs, hairpieces, pins  [Pre tx medications] : pre tx medications  [No make-up, creams] : no make-up, creams  [No jewelry] : no jewelry  [No matches, cigarettes, lighters] : no matches, cigarettes, lighters  [Hearing aid removed] : hearing aid removed [Dentures removed] : dentures removed [Ground bracelet on pt's wrist] : ground bracelet on pt's wrist  [Contacts removed] : contacts removed  [Remove nail polish] : remove nail polish  [No reading material] : no reading material  [Bra, undergarments removed] : bra, undergarments removed  [No contraindicated dressings] : no contraindicated dressings [Ground Wire - VISUAL Verification - Intact/Free of Obstruction] : Ground Wire - VISUAL Verification - Intact/Free of Obstruction  [Ground Continuity - Verified < 1 ohm w/ Wrist Strap Colton] : Ground Continuity - Verified < 1 ohm w/ Wrist Strap [place]ton [____] : Post-Dive: Time - [unfilled] [___] : Post-Dive: Value - [unfilled] mg/dL [Clear all fields] : clear all fields [] : No [FreeTextEntry4] : 100 [FreeTextEntry6] : 4243 [FreeTextEntry8] : 3674 [de-identified] : 8722 [de-identified] : 8818 [de-identified] : 7567 [de-identified] : 6167 [de-identified] : 3142 [de-identified] : 1810 [de-identified] : 120 MINUTES

## 2022-07-28 NOTE — ADDENDUM
[FreeTextEntry1] : PT ARRIVED A&OX3 \par ALL VITALS WITHIN PARAMETERS FOR HBOT\par PT PRE-DIVE CHECKLIST SIGNED AND WITNESSED BY T\par PT DESCENDED TO 2.4 PEACE @ 2.2 PSI/MIN IN CHAMBER #2  WITHOUT INCIDENT\par PT RESTING AT TX DEPTH WITH VISIBLE CHEST RISE AND FALL OBSERVED CHAMBER SIDE\par Transfer of Observation from  to [facility] @ 16:30.\par The pt. was observed with visible chest motion and without incident for the duration of remaining HBOT.\par The pt. was administered intermittent med. air over course of HBOT without incident. \par The pt. ascended from tx. depth to surface without incident.\par The pt. tolerated HBOT without incident.\par The pt. exited HBO suite ambulatory and independently. \par

## 2022-07-29 ENCOUNTER — APPOINTMENT (OUTPATIENT)
Dept: HYPERBARIC MEDICINE | Facility: HOSPITAL | Age: 67
End: 2022-07-29

## 2022-07-29 ENCOUNTER — OUTPATIENT (OUTPATIENT)
Dept: OUTPATIENT SERVICES | Facility: HOSPITAL | Age: 67
LOS: 1 days | Discharge: HOME | End: 2022-07-29
Payer: MEDICARE

## 2022-07-29 VITALS
SYSTOLIC BLOOD PRESSURE: 137 MMHG | HEART RATE: 56 BPM | RESPIRATION RATE: 18 BRPM | DIASTOLIC BLOOD PRESSURE: 92 MMHG | OXYGEN SATURATION: 99 % | TEMPERATURE: 98.5 F

## 2022-07-29 VITALS
HEART RATE: 74 BPM | RESPIRATION RATE: 18 BRPM | OXYGEN SATURATION: 100 % | TEMPERATURE: 97.6 F | SYSTOLIC BLOOD PRESSURE: 127 MMHG | DIASTOLIC BLOOD PRESSURE: 83 MMHG

## 2022-07-29 DIAGNOSIS — M27.2 INFLAMMATORY CONDITIONS OF JAWS: ICD-10-CM

## 2022-07-29 DIAGNOSIS — Z85.810 PERSONAL HISTORY OF MALIGNANT NEOPLASM OF TONGUE: ICD-10-CM

## 2022-07-29 DIAGNOSIS — Z98.52 VASECTOMY STATUS: Chronic | ICD-10-CM

## 2022-07-29 PROCEDURE — 82962 GLUCOSE BLOOD TEST: CPT

## 2022-07-29 PROCEDURE — G0277: CPT

## 2022-07-29 PROCEDURE — 99183 HYPERBARIC OXYGEN THERAPY: CPT

## 2022-07-30 DIAGNOSIS — M27.2 INFLAMMATORY CONDITIONS OF JAWS: ICD-10-CM

## 2022-07-30 DIAGNOSIS — Z85.810 PERSONAL HISTORY OF MALIGNANT NEOPLASM OF TONGUE: ICD-10-CM

## 2022-07-30 DIAGNOSIS — Z20.822 CONTACT WITH AND (SUSPECTED) EXPOSURE TO COVID-19: ICD-10-CM

## 2022-08-01 ENCOUNTER — APPOINTMENT (OUTPATIENT)
Dept: HYPERBARIC MEDICINE | Facility: HOSPITAL | Age: 67
End: 2022-08-01

## 2022-08-01 ENCOUNTER — OUTPATIENT (OUTPATIENT)
Dept: OUTPATIENT SERVICES | Facility: HOSPITAL | Age: 67
LOS: 1 days | Discharge: HOME | End: 2022-08-01
Payer: MEDICARE

## 2022-08-01 VITALS
DIASTOLIC BLOOD PRESSURE: 74 MMHG | SYSTOLIC BLOOD PRESSURE: 96 MMHG | HEART RATE: 57 BPM | TEMPERATURE: 97.9 F | OXYGEN SATURATION: 97 % | RESPIRATION RATE: 18 BRPM

## 2022-08-01 VITALS
HEART RATE: 65 BPM | TEMPERATURE: 97.7 F | SYSTOLIC BLOOD PRESSURE: 111 MMHG | RESPIRATION RATE: 20 BRPM | DIASTOLIC BLOOD PRESSURE: 74 MMHG | OXYGEN SATURATION: 97 %

## 2022-08-01 DIAGNOSIS — Z98.52 VASECTOMY STATUS: Chronic | ICD-10-CM

## 2022-08-01 DIAGNOSIS — Z85.810 PERSONAL HISTORY OF MALIGNANT NEOPLASM OF TONGUE: ICD-10-CM

## 2022-08-01 DIAGNOSIS — M27.2 INFLAMMATORY CONDITIONS OF JAWS: ICD-10-CM

## 2022-08-01 PROCEDURE — 82962 GLUCOSE BLOOD TEST: CPT

## 2022-08-01 PROCEDURE — G0277: CPT

## 2022-08-01 PROCEDURE — 99183 HYPERBARIC OXYGEN THERAPY: CPT

## 2022-08-02 ENCOUNTER — OUTPATIENT (OUTPATIENT)
Dept: OUTPATIENT SERVICES | Facility: HOSPITAL | Age: 67
LOS: 1 days | Discharge: HOME | End: 2022-08-02
Payer: MEDICARE

## 2022-08-02 ENCOUNTER — APPOINTMENT (OUTPATIENT)
Dept: HYPERBARIC MEDICINE | Facility: HOSPITAL | Age: 67
End: 2022-08-02

## 2022-08-02 VITALS
DIASTOLIC BLOOD PRESSURE: 60 MMHG | RESPIRATION RATE: 16 BRPM | TEMPERATURE: 98.7 F | SYSTOLIC BLOOD PRESSURE: 132 MMHG | HEART RATE: 74 BPM | OXYGEN SATURATION: 99 %

## 2022-08-02 VITALS
RESPIRATION RATE: 18 BRPM | OXYGEN SATURATION: 96 % | DIASTOLIC BLOOD PRESSURE: 81 MMHG | SYSTOLIC BLOOD PRESSURE: 150 MMHG | HEART RATE: 72 BPM | TEMPERATURE: 97.6 F

## 2022-08-02 DIAGNOSIS — Z98.52 VASECTOMY STATUS: Chronic | ICD-10-CM

## 2022-08-02 DIAGNOSIS — M27.2 INFLAMMATORY CONDITIONS OF JAWS: ICD-10-CM

## 2022-08-02 DIAGNOSIS — Z85.810 PERSONAL HISTORY OF MALIGNANT NEOPLASM OF TONGUE: ICD-10-CM

## 2022-08-02 PROCEDURE — 82962 GLUCOSE BLOOD TEST: CPT

## 2022-08-02 PROCEDURE — 99183 HYPERBARIC OXYGEN THERAPY: CPT

## 2022-08-02 PROCEDURE — G0277: CPT

## 2022-08-02 NOTE — PROCEDURE
[Outpatient] : Outpatient [Ambulatory] : Patient is ambulatory. [THIS CHAMBER HAS BEEN CLEANED / DISINFECTED] : This chamber has been cleaned / disinfected according to local and hospital policy and procedure prior to this treatment. [Patient demonstrated and verbalized proper technique for using air break mask] : Patient demonstrated and verbalized proper technique for using air break mask [Patient educated on the risks of SMOKING prior to HBOT with understanding] : Patient educated on the risks of SMOKING prior to HBOT with understanding [Patient educated on the risks of CONSUMING ALCOHOL prior to HBOT with understanding] : Patient educated on the risks of CONSUMING ALCOHOL prior to HBOT with understanding [100% Cotton] : 100% cotton [Empty all pockets] : empty all pockets [No hair oils, wigs, hairpieces, pins] : no hair oils, wigs, hairpieces, pins  [Pre tx medications] : pre tx medications  [No make-up, creams] : no make-up, creams  [No jewelry] : no jewelry  [No matches, cigarettes, lighters] : no matches, cigarettes, lighters  [Hearing aid removed] : hearing aid removed [Dentures removed] : dentures removed [Ground bracelet on pt's wrist] : ground bracelet on pt's wrist  [Contacts removed] : contacts removed  [Remove nail polish] : remove nail polish  [No reading material] : no reading material  [Bra, undergarments removed] : bra, undergarments removed  [No contraindicated dressings] : no contraindicated dressings [Ground Wire - VISUAL Verification - Intact/Free of Obstruction] : Ground Wire - VISUAL Verification - Intact/Free of Obstruction  [Ground Continuity - Verified < 1 ohm w/ Wrist Strap Colton] : Ground Continuity - Verified < 1 ohm w/ Wrist Strap Colton [Diagnosis: ___] : Diagnosis: [unfilled] [Number: ___] : Number: [unfilled] [____] : Post-Dive: Time - [unfilled] [___] : Post-Dive: Value - [unfilled] mg/dL [Clear all fields] : clear all fields [] : No [FreeTextEntry4] : 65 [FreeTextEntry6] : 17 : 35 [FreeTextEntry8] : 17 : 45 [de-identified] : 18 : 20 [de-identified] : 18 : 15 [de-identified] : -- [de-identified] : -- [de-identified] : 18 : 50 [de-identified] : 19 : 00 [de-identified] : 85 min.

## 2022-08-02 NOTE — PROCEDURE
[Outpatient] : Outpatient [Ambulatory] : Patient is ambulatory. [THIS CHAMBER HAS BEEN CLEANED / DISINFECTED] : This chamber has been cleaned / disinfected according to local and hospital policy and procedure prior to this treatment. [___] : Pre-Dive: Value - [unfilled] mg/dL [Patient demonstrated and verbalized proper technique for using air break mask] : Patient demonstrated and verbalized proper technique for using air break mask [Patient educated on the risks of SMOKING prior to HBOT with understanding] : Patient educated on the risks of SMOKING prior to HBOT with understanding [Patient educated on the risks of CONSUMING ALCOHOL prior to HBOT with understanding] : Patient educated on the risks of CONSUMING ALCOHOL prior to HBOT with understanding [100% Cotton] : 100% cotton [Empty all pockets] : empty all pockets [No hair oils, wigs, hairpieces, pins] : no hair oils, wigs, hairpieces, pins  [Pre tx medications] : pre tx medications  [No make-up, creams] : no make-up, creams  [No jewelry] : no jewelry  [No matches, cigarettes, lighters] : no matches, cigarettes, lighters  [Hearing aid removed] : hearing aid removed [Dentures removed] : dentures removed [Ground bracelet on pt's wrist] : ground bracelet on pt's wrist  [Contacts removed] : contacts removed  [Remove nail polish] : remove nail polish  [No reading material] : no reading material  [Bra, undergarments removed] : bra, undergarments removed  [No contraindicated dressings] : no contraindicated dressings [Ground Wire - VISUAL Verification - Intact/Free of Obstruction] : Ground Wire - VISUAL Verification - Intact/Free of Obstruction  [Ground Continuity - Verified < 1 ohm w/ Wrist Strap Colton] : Ground Continuity - Verified < 1 ohm w/ Wrist Strap Colton [Number: ___] : Number: [unfilled] [Diagnosis: ___] : Diagnosis: [unfilled] [____] : Post-Dive: Time - [unfilled] [Clear all fields] : clear all fields [] : No [FreeTextEntry4] : 100 [facility]] : 1654 [FreeTextEntry8] : 8835 [de-identified] : 9947 [de-identified] : 1525 [de-identified] : 9910 [de-identified] : 4001 [de-identified] : 2488 [de-identified] : 5170 [de-identified] : 120 MINUTES

## 2022-08-02 NOTE — ADDENDUM
[FreeTextEntry1] : PT ARRIVED A&OX3 \par ALL VITALS WITHIN PARAMETERS FOR HBOT\par PT PRE-DIVE CHECKLIST SIGNED AND WITNESSED BY \par PT DESCENDED TO 2.4 PEACE @ 2.2 PSI/MIN IN CHAMBER #1  WITHOUT INCIDENT\par PT RESTING AT TX DEPTH WITH VISIBLE CHEST RISE AND FALL OBSERVED CHAMBER SIDE\par PT TOLERATED AIR BREAKS WELL\par PT ASCENDED FROM 2.4 PEACE @ 2.2 PSI/MIN WITHOUT INCIDENT\par PT TOLERATED TX WELL\par

## 2022-08-02 NOTE — ADDENDUM
[FreeTextEntry1] : The pt. presented to [facility] ambulatory and A&Ox3 for scheduled HBOT.\par \par The pt. was offered and declined positional measures prior to start of HBOT.\par \par The pt. advised he may not receive tx. for the remainder of the week after Weds. 08/03/22 tooth extraction depending on  how he feels ; \par pt. was advised of clinical evidence supporting improved outcomes the sooner after sx. that the pt. resumes HBOT ; \par pt. acknowledged same and states he will advise [facility].\par \par The pt's pre-dive screening found the pt. to be within acceptable limits to begin HBOT.\par "HBO-TO" secondary PDS x CHT found the pt. fit to begin HBOTx2.\par \par MD ordered 65 min. @ HBOT tx. depth (60 min. O2 with 1 AIR BREAK) -- pt. was informed of and agreed to same at start of HBOT descent. \par Written order for same obtained prior to start of HBOT ; \par Written + signed HBOT order witnessed x CHTx2. \par \par The pt. descended @ 2.2 PSI/min. to Rx'd HBOT tx. depth of 2.4 PEACE in chamber # 1 without incident.\par The pt. was observed with visible chest motion and without incident for the duration of HBOT.\par The pt. was administered one intermittent med. air interval over course of HBOT without incident. \par The pt. ascended from tx. depth to surface without incident.\par The pt. tolerated HBOT without incident.\par The pt. exited HBO suite ambulatory and independently.

## 2022-08-03 ENCOUNTER — APPOINTMENT (OUTPATIENT)
Dept: HYPERBARIC MEDICINE | Facility: HOSPITAL | Age: 67
End: 2022-08-03

## 2022-08-03 ENCOUNTER — OUTPATIENT (OUTPATIENT)
Dept: OUTPATIENT SERVICES | Facility: HOSPITAL | Age: 67
LOS: 1 days | Discharge: HOME | End: 2022-08-03
Payer: MEDICARE

## 2022-08-03 VITALS
DIASTOLIC BLOOD PRESSURE: 85 MMHG | RESPIRATION RATE: 20 BRPM | SYSTOLIC BLOOD PRESSURE: 123 MMHG | TEMPERATURE: 98.1 F | HEART RATE: 69 BPM | OXYGEN SATURATION: 96 %

## 2022-08-03 VITALS
HEART RATE: 67 BPM | OXYGEN SATURATION: 98 % | TEMPERATURE: 97.6 F | RESPIRATION RATE: 20 BRPM | SYSTOLIC BLOOD PRESSURE: 124 MMHG | DIASTOLIC BLOOD PRESSURE: 83 MMHG

## 2022-08-03 DIAGNOSIS — Z98.52 VASECTOMY STATUS: Chronic | ICD-10-CM

## 2022-08-03 DIAGNOSIS — M27.2 INFLAMMATORY CONDITIONS OF JAWS: ICD-10-CM

## 2022-08-03 LAB — SARS-COV-2 RNA SPEC QL NAA+PROBE: SIGNIFICANT CHANGE UP

## 2022-08-03 PROCEDURE — 99183 HYPERBARIC OXYGEN THERAPY: CPT

## 2022-08-03 PROCEDURE — G0277: CPT

## 2022-08-03 PROCEDURE — U0005: CPT

## 2022-08-03 PROCEDURE — 82962 GLUCOSE BLOOD TEST: CPT

## 2022-08-03 PROCEDURE — U0003: CPT

## 2022-08-03 NOTE — PROCEDURE
[Outpatient] : Outpatient [Ambulatory] : Patient is ambulatory. [THIS CHAMBER HAS BEEN CLEANED / DISINFECTED] : This chamber has been cleaned / disinfected according to local and hospital policy and procedure prior to this treatment. [____] : Post-Dive: Time - [unfilled] [___] : Post-Dive: Value - [unfilled] mg/dL [Patient demonstrated and verbalized proper technique for using air break mask] : Patient demonstrated and verbalized proper technique for using air break mask [Patient educated on the risks of SMOKING prior to HBOT with understanding] : Patient educated on the risks of SMOKING prior to HBOT with understanding [Patient educated on the risks of CONSUMING ALCOHOL prior to HBOT with understanding] : Patient educated on the risks of CONSUMING ALCOHOL prior to HBOT with understanding [100% Cotton] : 100% cotton [Empty all pockets] : empty all pockets [No hair oils, wigs, hairpieces, pins] : no hair oils, wigs, hairpieces, pins  [Pre tx medications] : pre tx medications  [No make-up, creams] : no make-up, creams  [No jewelry] : no jewelry  [No matches, cigarettes, lighters] : no matches, cigarettes, lighters  [Hearing aid removed] : hearing aid removed [Dentures removed] : dentures removed [Ground bracelet on pt's wrist] : ground bracelet on pt's wrist  [Contacts removed] : contacts removed  [Remove nail polish] : remove nail polish  [No reading material] : no reading material  [Bra, undergarments removed] : bra, undergarments removed  [No contraindicated dressings] : no contraindicated dressings [Ground Wire - VISUAL Verification - Intact/Free of Obstruction] : Ground Wire - VISUAL Verification - Intact/Free of Obstruction  [Ground Continuity - Verified < 1 ohm w/ Wrist Strap Colton] : Ground Continuity - Verified < 1 ohm w/ Wrist Strap Colton [Number: ___] : Number: [unfilled] [Diagnosis: ___] : Diagnosis: [unfilled] [Clear all fields] : clear all fields [] : No [FreeTextEntry2] : MD Rx Verified [Hard Copy] [FreeTextEntry4] : 100 Mins [FreeTextEntry6] : 16 : 26 [FreeTextEntry8] : 16 : 36 [de-identified] : 17 : 06 [de-identified] : 17 : 11 [de-identified] : 17 : 41 [de-identified] : 17 : 46 [de-identified] : 18:16 [de-identified] : 18:21 [de-identified] : 120 Mins

## 2022-08-03 NOTE — ADDENDUM
[FreeTextEntry1] : The pt. presented to [facility] ambulatory and A&Ox3 for scheduled HBOT.\par \par The pt. c/o "lightheadedness" and "a headache" that "wasn't too bad, but is still there" beginning after leaving [facility] yesterday evening.\par \par Pt. was questioned if he had hydrated upon leaving [facility] as advised due to 96 systolic BP post-tx. on 08/01/22 ; pt. reported he did, as well as "went to [employer] to get something to eat, which he hadn't done all day."\par \par Pt. additionally reported having eaten well and hydrated today, as well as having been to oral sx. for pre-surgical workup ahead of tomorrow afternoon's tooth extraction.\par \par Pt. was provided positional measure to support upright positioning for the duration of HBOT after c/o coughing and phlegm beginning during course of HBOT ; pt. expressed comfort with the placement of same prior to start of HBOT.\par Pt. reported he did not comply with AFRIN NASAL DECONGESTANT SPRAY prior to arriving at [facility]. \par Pt. was offered and declined NASAL DECONGESTANT SPRAY prior to start of HBOT.\par Pt. declined EARPLANES prior to start of HBOT.\par \par The pt's pre-dive screening was found to be within acceptable limits to begin HBOT.\par "HBO-TO" secondary PDS found the pt. fit to begin HBOTx2.\par The pt. descended @ 2.2 PSI/min. to Rx'd HBOT tx. depth of 2.4 PEACE in chamber # 2 without incident.\par The pt. was observed with visible chest motion and without incident for the duration of observed HBOT.\par The pt. was administered intermittent med. air over course of HBOT without incident. \par Transfer of Observation from [facility] to [facility] @ 18:00.\par \par Pt ascended from 2.4 PEACE @ 2.2 PSI/min without incident. \par Pt tolerated tx well.\par

## 2022-08-04 ENCOUNTER — APPOINTMENT (OUTPATIENT)
Dept: HYPERBARIC MEDICINE | Facility: HOSPITAL | Age: 67
End: 2022-08-04

## 2022-08-04 DIAGNOSIS — Z20.822 CONTACT WITH AND (SUSPECTED) EXPOSURE TO COVID-19: ICD-10-CM

## 2022-08-04 DIAGNOSIS — Z85.810 PERSONAL HISTORY OF MALIGNANT NEOPLASM OF TONGUE: ICD-10-CM

## 2022-08-04 DIAGNOSIS — M27.2 INFLAMMATORY CONDITIONS OF JAWS: ICD-10-CM

## 2022-08-04 NOTE — ADDENDUM
[FreeTextEntry1] : PT ARRIVED AMBULATORY A&OX4.\par ALL VITALS WITHIN PARAMETERS FOR HBOT.\par DESCENT WAS 1 MIN LONGER THAN ANTICIPATED DUE TO PRESSURIZATION RATE(PSI)OF CHAMBER.\par PT DESCENT TO RX TX DEPTH IN CHAMBER 3 WAS WITHOUT INCIDENT. PT RESTING AT DEPTH, CHEST RISE AND FALL OBSERVED.\par COVID SWAB TO BE OBTAINED POST HBOT.\par PT TOLERATED AIR BREAKS WELL.\par PT ASCENT WAS WITHOUT INCIDENT. PT TOLERATED HBOT WELL.\par PT RETAINER IN DENTURE CUP RETURNED TO PT POST HBOT.\par \par CHT RASHEEDA QUIROZ 08/03/22

## 2022-08-04 NOTE — PROCEDURE
[Outpatient] : Outpatient [Ambulatory] : Patient is ambulatory. [THIS CHAMBER HAS BEEN CLEANED / DISINFECTED] : This chamber has been cleaned / disinfected according to local and hospital policy and procedure prior to this treatment. [___] : Pre-Dive: Value - [unfilled] mg/dL [Patient demonstrated and verbalized proper technique for using air break mask] : Patient demonstrated and verbalized proper technique for using air break mask [Patient educated on the risks of SMOKING prior to HBOT with understanding] : Patient educated on the risks of SMOKING prior to HBOT with understanding [Patient educated on the risks of CONSUMING ALCOHOL prior to HBOT with understanding] : Patient educated on the risks of CONSUMING ALCOHOL prior to HBOT with understanding [100% Cotton] : 100% cotton [Empty all pockets] : empty all pockets [No hair oils, wigs, hairpieces, pins] : no hair oils, wigs, hairpieces, pins  [Pre tx medications] : pre tx medications  [No make-up, creams] : no make-up, creams  [No jewelry] : no jewelry  [No matches, cigarettes, lighters] : no matches, cigarettes, lighters  [Hearing aid removed] : hearing aid removed [Dentures removed] : dentures removed [Ground bracelet on pt's wrist] : ground bracelet on pt's wrist  [Contacts removed] : contacts removed  [Remove nail polish] : remove nail polish  [No reading material] : no reading material  [Bra, undergarments removed] : bra, undergarments removed  [No contraindicated dressings] : no contraindicated dressings [Ground Wire - VISUAL Verification - Intact/Free of Obstruction] : Ground Wire - VISUAL Verification - Intact/Free of Obstruction  [Ground Continuity - Verified < 1 ohm w/ Wrist Strap Colton] : Ground Continuity - Verified < 1 ohm w/ Wrist Strap Colton [Number: ___] : Number: [unfilled] [Diagnosis: ___] : Diagnosis: [unfilled] [____] : Post-Dive: Time - [unfilled] [Clear all fields] : clear all fields [] : No [FreeTextEntry4] : 100 MIN [FreeTextEntry6] : 8:17 [FreeTextEntry8] : 8:28 [de-identified] : 8:58 [de-identified] : 9:03 [de-identified] : 9:33 [de-identified] : 9:38 [de-identified] : 10:08 [de-identified] : 10:18 [de-identified] : 121 MIN

## 2022-08-04 NOTE — ASSESSMENT
DIABETES PROGRESS REPORT    The patient was seen from 1230 to 1400 for Diabetes self-management education in a/an individual consult setting.  The instruction was given to the patient using the Sign    The barriers to self-care and learning limitations were assessed as the patient is deaf.     Preferences for learning: Sign      All nine content areas were assessed and the patient's learning needs are:  diabetes disease process/treatment options, medical nutrition therapy, physical activity, medications, monitoring, acute complications, chronic complications, behavior change strategies and psychosocial adjustment   .  Weight    01/03/18 1346   Weight: 80.1 kg     Hemoglobin A1C (%)   Date Value   12/01/2017 10.3 (H)     CHOLESTEROL (mg/dL)   Date Value   12/01/2017 162      HDL (mg/dL)   Date Value   12/01/2017 55    No components found for: LDL   TRIGLYCERIDE (mg/dL)   Date Value   12/01/2017 317 (H)      CALCULATED LDL (mg/dL)   Date Value   12/01/2017 44      Creatinine (mg/dL)   Date Value   12/01/2017 0.98 (H)       Reason for Visit: Initial Diabetes Education      Fasting blood sugar ranges at home:  Has not been testing on a regular basis   Ranges of other home blood sugar:    Blood sugar at office visit: 114 random     Current Diabetes Medications:   Invokana 100 mg  Glipizide 10 mg BID     Teaching was provided on the following:    Diabetes Disease Process and Treatment Options:  Importance of diabetes control, Ongoing education and Possible treatment changes/options.   Competent    Flu vaccine, pneumonia vaccine, medication card, identification card, advance directives  done and written information provided    Medical Nutrition Therapy    Plate method introduction, Avoiding concentrated sweets and Including sugar-free fluids in meal plan.  Needs Review of:  Meal planning, CHO counting     Physical Activity - Incorporating Activity into Lifestyle       Effects of physical activity on  [No change from previous assessment] : No change from previous assessment blood sugar, general health benefits, hypoglycemia prevention and Developing a physical activity plan/goals (types, frequency, duration, intensity, medical clearance)  Needs Review of:   comptetent                                            Blood Glucose Monitoring - Meter given: one touch ultra.  Reviewed testing technique, times, record keeping, blood sugar targets, and sharps disposal.      Competent    Diabetes Medications  (dose, schedule, action, and side effects)  Competent    Acute Complications - Prevention Detection and Treatment:  Hypoglycemia (risks, causes, signs, symptoms, treatment, and prevention), Problem solving and when to call their provider and Medical identification use.  Needs reinforcement     Chronic Complications - Prevention, Detection & Treatment:  Essential care guidelines (MD/Dental,Eye exams, Lower extremity exams).  Needs reinforcement        Comments:  Her for the initial appointment with the RN  The patient is present with the sign . The patient is deaf. She uses a walker for ambulation. She does live alone. She states that she has had diabetes for a long time. She is on 2 oral agents, glipizide 10 mg BID and invokana 100 mg. Her most recent A1C is 10.3%. She has not been checking her blood sugars. Her blood sugar was 114 today, but the patient had not eaten lunch. She has agreed to test once a day fasting.  The patient is refusing insulin therapy as recommended by her physician. The patient had some questions regarding her diet. I addressed meal planning using the plate method. I am unsure of how much the patient is able to comprehend, so I kept things as simple as possible. The patient does enjoy eating her sweets, which include cakes and cookies. We discussed how to limit her intake of these foods. I have encouraged her to drink at least 8 cups of water a day especially given that she is taking invokana. The patients exercise is limited due to having some difficulty  [Patient prepared for dive] : Patient prepared for dive walking and using a walker. I have suggested to her that she walks in place holding on to her walker when she is watching television and a commercial comes on. During that time she could do some in place walking. She was given a diabetes alert ID card which I filled out for her. I discussed how to treat hypoglycemia. She was encouraged to have a form of glucose next to her bed and on her person at all times. The patient is aware of the fact that there needs to be some improvement in her glucose control. I am unsure if she comprehends the importance of this to help prevent complications.       Written materials provided were:  Diabetes identification card, A1c - For Your Well-Being and Diabetes in Control Booklet, Snack Smart with Diabetes, CHO's and Their Effect on Blood Sugars, Get Smart about Counting CHO's     Goal Setting to Promote Health and Problem Solving for Daily Living was discussed.  Patient selected goal of:  To test once a day  Patient achieved goal new goal   Carry diabetes identification card at all time - achieved goal new goal     Diabetes Self-Management Support Plan:    Diabetes Support Group  Family Support    Diabetes Support Website  Exercise Facility:   X Diabetes Educator Contact Number  Senior Fitness Center:    Care Management Program  Food/Weight Counseling Program:    Patient Assistance Program  Annual Program Review    Community Program: Living Well with Chronic Conditions Classes  Behavioral Change Specialist    Community Program: Healthy Living with Diabetes         Plan:  To see the RD in 2 weeks  See Patient Instructions for further information  Please contact us with any questions/concerns:  Sweetie Roldan RN                  [facility]       Report sent to referring provider  Meme Santos MD, MD order located in the patient's computer chart.      Diabetes Self-Assessment    Health History  Do you like help filling out, or reading  [Patient undergoing HBO treatment for __________] : Patient undergoing HBO treatment for [unfilled] health forms?  no  Do you know what type of diabetes you have?  yes   Age at diagnosis:  Years ago  Does any of your family have diabetes? yes   Have you had diabetes education before? Years ago         Woman's Health  Are you planning a pregnancy?  NA  Have you ever had gestational diabetes or a baby weighing 9 lbs. or more at birth?  no        Nutrition/Meal Planning  Do you eat three meals a day?  yes    Do you eat at about the same time each day?  no  Do have diet limitations? (Examples, low salt, no dairy products, no wheat products)  yes   Do you drink regular soda or fruit drinks?  yes   Do ou eat desserts/sweets more than once or twice a week?  no          Activity  Do you exercise at least 30 minutes/3 or more times per week?   no  Is there a medical reason for limiting activity?  yes   What type of activity do you do?  Walk    Monitoring  Do you test your blood sugars at home?  yes   Meter brand:  One touch ultra Testing times/day:  Has not been testing  Do you check your blood sugar level before driving?  NA  Do you have a sharps container?  yes         Medication  Do you miss or forget to take your pills or insulin?  no  Do you carry a current medication list or card in your wallet?  no  Do you carry or wear diabetes identification?  no  Do you carry a form of fast-acting sugar with you?  no         Complications/Problems  Do you have numbness, tingling, sores, or pain in your hands or feet?  yes   Do you check your feet for any signs of problems?  no  -How many days of the week do you check your feet?       Did you have a dilated eye exam in the past year?  yes    Date:  2017  Have you seen a dentist in the past year?  yes   Have you had a change or loss in hearing?  yes   Do you sleep 6-8 hours per night?  yes   Do you have sleep apnea?  no  Do you have any sexual problems?  no        Socioeconomic  Do you live alone?  yes  Do you feel safe in relationships at home?  yes   Do money concerns keep you  [Patient descended without problem for 9 minutes] : Patient descended without problem for 9 minutes from following health care advice?  no  Do you have cultural or [employer] practices or beliefs that influence how you care for your diabetes?  no  Do you work outside the home?  no  Do you work second or third shift?  no  Over the last 2 weeks, have you been bothered by feeling loss of interest in doing things?      Over the last 2 weeks, have you felt down, depressed or hopeless?        What is the best way for you to learn?  Discussion and Demonstration           [No dizziness or thirst] :  No dizziness or thirst [No ear problems] : No ear problems [Vital signs stable] : Vital signs stable [Tolerating dive well] : Tolerating dive well [No Chest Pain, shortness of breath] : No Chest Pain, shortness of breath [Respiratory Rate Stable] : Respiratory Rate Stable [No chest pain, shortness of breath, or ear pain] :  No chest pain, shortness of breath, or ear pain  [Tolerated Ascent well] : Tolerated Ascent well [Vital Signs stable] : Vital Signs stable [A physician was present throughout the entire HBOT] : A physician was present throughout the entire HBOT [No] : No [Clinically Stable] : Clinically stable [Continue Treatment Plan] : Continue treatment plan [FreeTextEntry2] : none

## 2022-08-05 ENCOUNTER — APPOINTMENT (OUTPATIENT)
Dept: HYPERBARIC MEDICINE | Facility: HOSPITAL | Age: 67
End: 2022-08-05

## 2022-08-08 ENCOUNTER — OUTPATIENT (OUTPATIENT)
Dept: OUTPATIENT SERVICES | Facility: HOSPITAL | Age: 67
LOS: 1 days | Discharge: HOME | End: 2022-08-08
Payer: MEDICARE

## 2022-08-08 ENCOUNTER — APPOINTMENT (OUTPATIENT)
Dept: HYPERBARIC MEDICINE | Facility: HOSPITAL | Age: 67
End: 2022-08-08

## 2022-08-08 VITALS
TEMPERATURE: 97.6 F | HEART RATE: 63 BPM | OXYGEN SATURATION: 98 % | RESPIRATION RATE: 18 BRPM | DIASTOLIC BLOOD PRESSURE: 81 MMHG | SYSTOLIC BLOOD PRESSURE: 118 MMHG

## 2022-08-08 DIAGNOSIS — Z98.52 VASECTOMY STATUS: Chronic | ICD-10-CM

## 2022-08-08 DIAGNOSIS — M27.2 INFLAMMATORY CONDITIONS OF JAWS: ICD-10-CM

## 2022-08-08 PROCEDURE — 82962 GLUCOSE BLOOD TEST: CPT

## 2022-08-08 PROCEDURE — G0277: CPT

## 2022-08-08 PROCEDURE — 99183 HYPERBARIC OXYGEN THERAPY: CPT

## 2022-08-09 ENCOUNTER — APPOINTMENT (OUTPATIENT)
Dept: HYPERBARIC MEDICINE | Facility: HOSPITAL | Age: 67
End: 2022-08-09

## 2022-08-09 ENCOUNTER — OUTPATIENT (OUTPATIENT)
Dept: OUTPATIENT SERVICES | Facility: HOSPITAL | Age: 67
LOS: 1 days | End: 2022-08-09
Payer: MEDICARE

## 2022-08-09 VITALS
SYSTOLIC BLOOD PRESSURE: 108 MMHG | HEART RATE: 74 BPM | RESPIRATION RATE: 16 BRPM | OXYGEN SATURATION: 98 % | DIASTOLIC BLOOD PRESSURE: 78 MMHG | TEMPERATURE: 98.3 F

## 2022-08-09 VITALS
HEART RATE: 78 BPM | TEMPERATURE: 98.6 F | OXYGEN SATURATION: 99 % | SYSTOLIC BLOOD PRESSURE: 115 MMHG | DIASTOLIC BLOOD PRESSURE: 75 MMHG | RESPIRATION RATE: 16 BRPM

## 2022-08-09 DIAGNOSIS — M27.2 INFLAMMATORY CONDITIONS OF JAWS: ICD-10-CM

## 2022-08-09 DIAGNOSIS — Z98.52 VASECTOMY STATUS: Chronic | ICD-10-CM

## 2022-08-09 PROCEDURE — 82962 GLUCOSE BLOOD TEST: CPT

## 2022-08-09 PROCEDURE — 99183 HYPERBARIC OXYGEN THERAPY: CPT

## 2022-08-10 ENCOUNTER — OUTPATIENT (OUTPATIENT)
Dept: OUTPATIENT SERVICES | Facility: HOSPITAL | Age: 67
LOS: 1 days | Discharge: HOME | End: 2022-08-10
Payer: MEDICARE

## 2022-08-10 ENCOUNTER — APPOINTMENT (OUTPATIENT)
Dept: HYPERBARIC MEDICINE | Facility: HOSPITAL | Age: 67
End: 2022-08-10

## 2022-08-10 VITALS
RESPIRATION RATE: 18 BRPM | HEART RATE: 50 BPM | SYSTOLIC BLOOD PRESSURE: 115 MMHG | OXYGEN SATURATION: 100 % | TEMPERATURE: 97 F | DIASTOLIC BLOOD PRESSURE: 78 MMHG

## 2022-08-10 VITALS
OXYGEN SATURATION: 98 % | TEMPERATURE: 97 F | HEART RATE: 56 BPM | DIASTOLIC BLOOD PRESSURE: 71 MMHG | SYSTOLIC BLOOD PRESSURE: 120 MMHG | RESPIRATION RATE: 18 BRPM

## 2022-08-10 DIAGNOSIS — M27.2 INFLAMMATORY CONDITIONS OF JAWS: ICD-10-CM

## 2022-08-10 DIAGNOSIS — Z98.52 VASECTOMY STATUS: Chronic | ICD-10-CM

## 2022-08-10 LAB — SARS-COV-2 RNA SPEC QL NAA+PROBE: SIGNIFICANT CHANGE UP

## 2022-08-10 PROCEDURE — G0277: CPT

## 2022-08-10 PROCEDURE — 99183 HYPERBARIC OXYGEN THERAPY: CPT

## 2022-08-10 PROCEDURE — 82962 GLUCOSE BLOOD TEST: CPT

## 2022-08-10 PROCEDURE — U0003: CPT

## 2022-08-10 PROCEDURE — U0005: CPT

## 2022-08-10 NOTE — PROCEDURE
[Outpatient] : Outpatient [Ambulatory] : Patient is ambulatory. [THIS CHAMBER HAS BEEN CLEANED / DISINFECTED] : This chamber has been cleaned / disinfected according to local and hospital policy and procedure prior to this treatment. [____] : Post-Dive: Time - [unfilled] [___] : Post-Dive: Value - [unfilled] mg/dL [Patient demonstrated and verbalized proper technique for using air break mask] : Patient demonstrated and verbalized proper technique for using air break mask [Patient educated on the risks of SMOKING prior to HBOT with understanding] : Patient educated on the risks of SMOKING prior to HBOT with understanding [Patient educated on the risks of CONSUMING ALCOHOL prior to HBOT with understanding] : Patient educated on the risks of CONSUMING ALCOHOL prior to HBOT with understanding [100% Cotton] : 100% cotton [Empty all pockets] : empty all pockets [No hair oils, wigs, hairpieces, pins] : no hair oils, wigs, hairpieces, pins  [Pre tx medications] : pre tx medications  [No make-up, creams] : no make-up, creams  [No jewelry] : no jewelry  [No matches, cigarettes, lighters] : no matches, cigarettes, lighters  [Hearing aid removed] : hearing aid removed [Dentures removed] : dentures removed [Ground bracelet on pt's wrist] : ground bracelet on pt's wrist  [Contacts removed] : contacts removed  [Remove nail polish] : remove nail polish  [No reading material] : no reading material  [Bra, undergarments removed] : bra, undergarments removed  [No contraindicated dressings] : no contraindicated dressings [Ground Wire - VISUAL Verification - Intact/Free of Obstruction] : Ground Wire - VISUAL Verification - Intact/Free of Obstruction  [Ground Continuity - Verified < 1 ohm w/ Wrist Strap Colton] : Ground Continuity - Verified < 1 ohm w/ Wrist Strap Colton [Diagnosis: ___] : Diagnosis: [unfilled] [Number: ___] : Number: [unfilled] [Clear all fields] : clear all fields [] : No [FreeTextEntry4] : 100 [FreeTextEntry6] : 6914 [FreeTextEntry8] : 2869 [de-identified] : 3321 [de-identified] : 9082 [de-identified] : 3148 [de-identified] : 1899 [de-identified] : 6545 [de-identified] : 1813 [de-identified] : 120 min.

## 2022-08-10 NOTE — ADDENDUM
[FreeTextEntry1] : PT ARRIVED A&OX3 \par ALL VITALS WITHIN PARAMETERS FOR HBOT WITH THE EXCEPTION OF BGL\par MD NOTIFIED, PT WAS GIVEN 16 GRAMS OF SUGAR VIA 2 JUICE, AS PER MD NO RETEST REQUIRED\par PT CLEARED TO BEGIN HBOT \par PT PRE-DIVE CHECKLIST SIGNED AND WITNESSED BY CHT\par Pt descended to 2.4 PEACE @ 2.2 PSI/min without incident in chamber #1\par Pt resting @ depth with chest rise and fall observed throughout tx. \par Pt tolerated air breaks well. \par Pt ascended from 2.4 PEACE @ 2.2 PSI/min without incident. \par Pt tolerated tx well.\par

## 2022-08-10 NOTE — PROCEDURE
[Outpatient] : Outpatient [Ambulatory] : Patient is ambulatory. [THIS CHAMBER HAS BEEN CLEANED / DISINFECTED] : This chamber has been cleaned / disinfected according to local and hospital policy and procedure prior to this treatment. [Patient demonstrated and verbalized proper technique for using air break mask] : Patient demonstrated and verbalized proper technique for using air break mask [Patient educated on the risks of SMOKING prior to HBOT with understanding] : Patient educated on the risks of SMOKING prior to HBOT with understanding [Patient educated on the risks of CONSUMING ALCOHOL prior to HBOT with understanding] : Patient educated on the risks of CONSUMING ALCOHOL prior to HBOT with understanding [100% Cotton] : 100% cotton [Empty all pockets] : empty all pockets [No hair oils, wigs, hairpieces, pins] : no hair oils, wigs, hairpieces, pins  [Pre tx medications] : pre tx medications  [No make-up, creams] : no make-up, creams  [No jewelry] : no jewelry  [No matches, cigarettes, lighters] : no matches, cigarettes, lighters  [Hearing aid removed] : hearing aid removed [Dentures removed] : dentures removed [Ground bracelet on pt's wrist] : ground bracelet on pt's wrist  [Contacts removed] : contacts removed  [Remove nail polish] : remove nail polish  [No reading material] : no reading material  [Bra, undergarments removed] : bra, undergarments removed  [No contraindicated dressings] : no contraindicated dressings [Ground Wire - VISUAL Verification - Intact/Free of Obstruction] : Ground Wire - VISUAL Verification - Intact/Free of Obstruction  [Ground Continuity - Verified < 1 ohm w/ Wrist Strap Colton] : Ground Continuity - Verified < 1 ohm w/ Wrist Strap Colton [Number: ___] : Number: [unfilled] [Diagnosis: ___] : Diagnosis: [unfilled] [____] : Post-Dive: Time - [unfilled] [___] : Post-Dive: Value - [unfilled] mg/dL [Clear all fields] : clear all fields [] : No [FreeTextEntry4] : 100 [FreeTextEntry6] : 0297 [[facility]Entry[facility]] : 4049 [de-identified] : 1737 [de-identified] : 7015 [de-identified] : 17 : 37 [de-identified] : 17 : 42 [de-identified] : 18 : 12 [de-identified] : 18 : 22 [de-identified] : 120 min.

## 2022-08-11 ENCOUNTER — APPOINTMENT (OUTPATIENT)
Dept: HYPERBARIC MEDICINE | Facility: HOSPITAL | Age: 67
End: 2022-08-11

## 2022-08-11 ENCOUNTER — OUTPATIENT (OUTPATIENT)
Dept: OUTPATIENT SERVICES | Facility: HOSPITAL | Age: 67
LOS: 1 days | Discharge: HOME | End: 2022-08-11
Payer: MEDICARE

## 2022-08-11 VITALS
DIASTOLIC BLOOD PRESSURE: 83 MMHG | HEART RATE: 66 BPM | TEMPERATURE: 97.8 F | RESPIRATION RATE: 16 BRPM | OXYGEN SATURATION: 94 % | SYSTOLIC BLOOD PRESSURE: 133 MMHG

## 2022-08-11 VITALS
OXYGEN SATURATION: 99 % | HEART RATE: 74 BPM | TEMPERATURE: 98.6 F | DIASTOLIC BLOOD PRESSURE: 88 MMHG | RESPIRATION RATE: 16 BRPM | SYSTOLIC BLOOD PRESSURE: 142 MMHG

## 2022-08-11 DIAGNOSIS — M27.2 INFLAMMATORY CONDITIONS OF JAWS: ICD-10-CM

## 2022-08-11 DIAGNOSIS — Z98.52 VASECTOMY STATUS: Chronic | ICD-10-CM

## 2022-08-11 DIAGNOSIS — Z85.810 PERSONAL HISTORY OF MALIGNANT NEOPLASM OF TONGUE: ICD-10-CM

## 2022-08-11 PROCEDURE — G0277: CPT

## 2022-08-11 PROCEDURE — 82962 GLUCOSE BLOOD TEST: CPT

## 2022-08-11 PROCEDURE — 99183 HYPERBARIC OXYGEN THERAPY: CPT

## 2022-08-11 NOTE — PROCEDURE
[Outpatient] : Outpatient [Ambulatory] : Patient is ambulatory. [THIS CHAMBER HAS BEEN CLEANED / DISINFECTED] : This chamber has been cleaned / disinfected according to local and hospital policy and procedure prior to this treatment. [Patient demonstrated and verbalized proper technique for using air break mask] : Patient demonstrated and verbalized proper technique for using air break mask [Patient educated on the risks of SMOKING prior to HBOT with understanding] : Patient educated on the risks of SMOKING prior to HBOT with understanding [Patient educated on the risks of CONSUMING ALCOHOL prior to HBOT with understanding] : Patient educated on the risks of CONSUMING ALCOHOL prior to HBOT with understanding [100% Cotton] : 100% cotton [Empty all pockets] : empty all pockets [No hair oils, wigs, hairpieces, pins] : no hair oils, wigs, hairpieces, pins  [Pre tx medications] : pre tx medications  [No make-up, creams] : no make-up, creams  [No jewelry] : no jewelry  [No matches, cigarettes, lighters] : no matches, cigarettes, lighters  [Hearing aid removed] : hearing aid removed [Dentures removed] : dentures removed [Ground bracelet on pt's wrist] : ground bracelet on pt's wrist  [Contacts removed] : contacts removed  [Remove nail polish] : remove nail polish  [No reading material] : no reading material  [Bra, undergarments removed] : bra, undergarments removed  [No contraindicated dressings] : no contraindicated dressings [Ground Wire - VISUAL Verification - Intact/Free of Obstruction] : Ground Wire - VISUAL Verification - Intact/Free of Obstruction  [Ground Continuity - Verified < 1 ohm w/ Wrist Strap Colton] : Ground Continuity - Verified < 1 ohm w/ Wrist Strap Colton [Number: ___] : Number: [unfilled] [Diagnosis: ___] : Diagnosis: [unfilled] [____] : Post-Dive: Time - [unfilled] [___] : Post-Dive: Value - [unfilled] mg/dL [Clear all fields] : clear all fields [] : No [FreeTextEntry4] : 100 [FreeTextEntry6] : 3492 [FreeTextEntry8] : 6540 [de-identified] : 7882 [de-identified] : 9244 [de-identified] : 7884 [de-identified] : 1498 [de-identified] : 1816 [de-identified] : 1825 [de-identified] : 120 MINUTES

## 2022-08-11 NOTE — ADDENDUM
[FreeTextEntry1] : PT ARRIVED A&OX3 \par ALL VITALS WITHIN PARAMETERS FOR HBOT\par PT PRE-DIVE CHECKLIST SIGNED AND WITNESSED BY CHT\par PT DESCENDED TO 2.4 PEACE @ 2.2 PSI/MIN IN CHAMBER #1  WITHOUT INCIDENT\par PT RESTING AT TX DEPTH WITH VISIBLE CHEST RISE AND FALL OBSERVED CHAMBER SIDE\par PT TOLERATED AIR BREAKS WELL\par PT ASCENDED FROM 2.4 PEACE @ 2.2 PSI/MIN WITHOUT INCIDENT\par PT TOLERATED TX WELL\par

## 2022-08-11 NOTE — PROCEDURE
[Outpatient] : Outpatient [Ambulatory] : Patient is ambulatory. [THIS CHAMBER HAS BEEN CLEANED / DISINFECTED] : This chamber has been cleaned / disinfected according to local and hospital policy and procedure prior to this treatment. [Patient demonstrated and verbalized proper technique for using air break mask] : Patient demonstrated and verbalized proper technique for using air break mask [Patient educated on the risks of SMOKING prior to HBOT with understanding] : Patient educated on the risks of SMOKING prior to HBOT with understanding [Patient educated on the risks of CONSUMING ALCOHOL prior to HBOT with understanding] : Patient educated on the risks of CONSUMING ALCOHOL prior to HBOT with understanding [100% Cotton] : 100% cotton [Empty all pockets] : empty all pockets [No hair oils, wigs, hairpieces, pins] : no hair oils, wigs, hairpieces, pins  [Pre tx medications] : pre tx medications  [No make-up, creams] : no make-up, creams  [No jewelry] : no jewelry  [No matches, cigarettes, lighters] : no matches, cigarettes, lighters  [Hearing aid removed] : hearing aid removed [Dentures removed] : dentures removed [Ground bracelet on pt's wrist] : ground bracelet on pt's wrist  [Contacts removed] : contacts removed  [Remove nail polish] : remove nail polish  [No reading material] : no reading material  [Bra, undergarments removed] : bra, undergarments removed  [No contraindicated dressings] : no contraindicated dressings [Ground Wire - VISUAL Verification - Intact/Free of Obstruction] : Ground Wire - VISUAL Verification - Intact/Free of Obstruction  [Ground Continuity - Verified < 1 ohm w/ Wrist Strap Colton] : Ground Continuity - Verified < 1 ohm w/ Wrist Strap Colton [Number: ___] : Number: [unfilled] [Diagnosis: ___] : Diagnosis: [unfilled] [____] : Post-Dive: Time - [unfilled] [___] : Post-Dive: Value - [unfilled] mg/dL [Clear all fields] : clear all fields [] : No [FreeTextEntry2] : 2.4 [FreeTextEntry4] : 100 Mins [FreeTextEntry6] : 9054 [FreeTextEntry8] : 4106 [de-identified] : 3201 [de-identified] : 8950 [de-identified] : 2723 [de-identified] : 7836 [de-identified] : 3660 [de-identified] : 1935 [de-identified] : 120 Mins

## 2022-08-11 NOTE — ADDENDUM
[FreeTextEntry1] : pt vitals were within acceptable parameters to begin hbot with the exception of bgl \par MD contacted and advised to give pt one intervention without retest \par pt given 16g of sugar without incident \par pt descended to tx depth 2.4 nhung @2.2 psi/min without incident in chamber #2 \par pt's resting at tx depth with visible chest rise and fall as observed chamber side \par pt tolerated both air breaks well. \par Pt ascended from 2.4 NHUNG @ 2.2 PSI/min without incident. \par Pt tolerated tx well.\par

## 2022-08-12 ENCOUNTER — OUTPATIENT (OUTPATIENT)
Dept: OUTPATIENT SERVICES | Facility: HOSPITAL | Age: 67
LOS: 1 days | Discharge: HOME | End: 2022-08-12
Payer: MEDICARE

## 2022-08-12 ENCOUNTER — APPOINTMENT (OUTPATIENT)
Dept: HYPERBARIC MEDICINE | Facility: HOSPITAL | Age: 67
End: 2022-08-12

## 2022-08-12 VITALS
TEMPERATURE: 97.7 F | RESPIRATION RATE: 18 BRPM | HEART RATE: 54 BPM | SYSTOLIC BLOOD PRESSURE: 124 MMHG | OXYGEN SATURATION: 99 % | DIASTOLIC BLOOD PRESSURE: 82 MMHG

## 2022-08-12 VITALS
RESPIRATION RATE: 18 BRPM | OXYGEN SATURATION: 95 % | TEMPERATURE: 97.1 F | DIASTOLIC BLOOD PRESSURE: 71 MMHG | SYSTOLIC BLOOD PRESSURE: 116 MMHG | HEART RATE: 58 BPM

## 2022-08-12 DIAGNOSIS — M27.2 INFLAMMATORY CONDITIONS OF JAWS: ICD-10-CM

## 2022-08-12 DIAGNOSIS — Z85.810 PERSONAL HISTORY OF MALIGNANT NEOPLASM OF TONGUE: ICD-10-CM

## 2022-08-12 DIAGNOSIS — Z98.52 VASECTOMY STATUS: Chronic | ICD-10-CM

## 2022-08-12 PROCEDURE — 82962 GLUCOSE BLOOD TEST: CPT

## 2022-08-12 PROCEDURE — 99183 HYPERBARIC OXYGEN THERAPY: CPT

## 2022-08-12 PROCEDURE — G0277: CPT

## 2022-08-13 ENCOUNTER — APPOINTMENT (OUTPATIENT)
Dept: HYPERBARIC MEDICINE | Facility: HOSPITAL | Age: 67
End: 2022-08-13

## 2022-08-13 ENCOUNTER — OUTPATIENT (OUTPATIENT)
Dept: OUTPATIENT SERVICES | Facility: HOSPITAL | Age: 67
LOS: 1 days | End: 2022-08-13
Payer: MEDICARE

## 2022-08-13 VITALS
RESPIRATION RATE: 16 BRPM | DIASTOLIC BLOOD PRESSURE: 78 MMHG | OXYGEN SATURATION: 98 % | HEART RATE: 74 BPM | TEMPERATURE: 98.6 F | SYSTOLIC BLOOD PRESSURE: 144 MMHG

## 2022-08-13 VITALS
SYSTOLIC BLOOD PRESSURE: 134 MMHG | TEMPERATURE: 98.6 F | RESPIRATION RATE: 16 BRPM | DIASTOLIC BLOOD PRESSURE: 88 MMHG | HEART RATE: 74 BPM | OXYGEN SATURATION: 99 %

## 2022-08-13 DIAGNOSIS — Z98.52 VASECTOMY STATUS: Chronic | ICD-10-CM

## 2022-08-13 DIAGNOSIS — M27.2 INFLAMMATORY CONDITIONS OF JAWS: ICD-10-CM

## 2022-08-13 PROCEDURE — 99183 HYPERBARIC OXYGEN THERAPY: CPT

## 2022-08-13 PROCEDURE — 82962 GLUCOSE BLOOD TEST: CPT

## 2022-08-13 NOTE — ADDENDUM
[FreeTextEntry1] : Pt descended to 2.4 PEACE @ 2.2 PSI/min without incident in chamber #2\par Pt resting @ depth with chest rise and fall observed throughout tx. \par Pt tolerated air breaks well. \par Pt ascended from 2.4 PEACE @ 2.2 PSI/min without incident. \par Pt tolerated tx well.\par \par

## 2022-08-13 NOTE — PROCEDURE
[Outpatient] : Outpatient [Ambulatory] : Patient is ambulatory. [THIS CHAMBER HAS BEEN CLEANED / DISINFECTED] : This chamber has been cleaned / disinfected according to local and hospital policy and procedure prior to this treatment. [____] : Post-Dive: Time - [unfilled] [___] : Post-Dive: Value - [unfilled] mg/dL [Patient demonstrated and verbalized proper technique for using air break mask] : Patient demonstrated and verbalized proper technique for using air break mask [Patient educated on the risks of SMOKING prior to HBOT with understanding] : Patient educated on the risks of SMOKING prior to HBOT with understanding [Patient educated on the risks of CONSUMING ALCOHOL prior to HBOT with understanding] : Patient educated on the risks of CONSUMING ALCOHOL prior to HBOT with understanding [100% Cotton] : 100% cotton [Empty all pockets] : empty all pockets [No hair oils, wigs, hairpieces, pins] : no hair oils, wigs, hairpieces, pins  [Pre tx medications] : pre tx medications  [No make-up, creams] : no make-up, creams  [No jewelry] : no jewelry  [No matches, cigarettes, lighters] : no matches, cigarettes, lighters  [Hearing aid removed] : hearing aid removed [Dentures removed] : dentures removed [Ground bracelet on pt's wrist] : ground bracelet on pt's wrist  [Contacts removed] : contacts removed  [Remove nail polish] : remove nail polish  [No reading material] : no reading material  [Bra, undergarments removed] : bra, undergarments removed  [No contraindicated dressings] : no contraindicated dressings [Ground Wire - VISUAL Verification - Intact/Free of Obstruction] : Ground Wire - VISUAL Verification - Intact/Free of Obstruction  [Ground Continuity - Verified < 1 ohm w/ Wrist Strap Colton] : Ground Continuity - Verified < 1 ohm w/ Wrist Strap Colton [Diagnosis: ___] : Diagnosis: [unfilled] [Number: ___] : Number: [unfilled] [Clear all fields] : clear all fields [] : No [FreeTextEntry2] :  Verified  [FreeTextEntry4] : 100 [FreeTextEntry6] : 1813 [FreeTextEntry8] : 6376 [de-identified] : 2052 [de-identified] : 7217 [de-identified] : 7616 [de-identified] : 2106 [de-identified] : 1270 [de-identified] : 1000 [de-identified] : 120 MINUTES

## 2022-08-15 ENCOUNTER — OUTPATIENT (OUTPATIENT)
Dept: OUTPATIENT SERVICES | Facility: HOSPITAL | Age: 67
LOS: 1 days | Discharge: HOME | End: 2022-08-15
Payer: MEDICARE

## 2022-08-15 ENCOUNTER — APPOINTMENT (OUTPATIENT)
Dept: HYPERBARIC MEDICINE | Facility: HOSPITAL | Age: 67
End: 2022-08-15

## 2022-08-15 VITALS
TEMPERATURE: 98.3 F | DIASTOLIC BLOOD PRESSURE: 74 MMHG | OXYGEN SATURATION: 97 % | HEART RATE: 66 BPM | RESPIRATION RATE: 18 BRPM | SYSTOLIC BLOOD PRESSURE: 122 MMHG

## 2022-08-15 VITALS
RESPIRATION RATE: 18 BRPM | TEMPERATURE: 97 F | HEART RATE: 51 BPM | DIASTOLIC BLOOD PRESSURE: 73 MMHG | OXYGEN SATURATION: 100 % | SYSTOLIC BLOOD PRESSURE: 129 MMHG

## 2022-08-15 DIAGNOSIS — M27.2 INFLAMMATORY CONDITIONS OF JAWS: ICD-10-CM

## 2022-08-15 DIAGNOSIS — Z85.810 PERSONAL HISTORY OF MALIGNANT NEOPLASM OF TONGUE: ICD-10-CM

## 2022-08-15 DIAGNOSIS — Z98.52 VASECTOMY STATUS: Chronic | ICD-10-CM

## 2022-08-15 PROCEDURE — 82962 GLUCOSE BLOOD TEST: CPT

## 2022-08-15 PROCEDURE — 99183 HYPERBARIC OXYGEN THERAPY: CPT

## 2022-08-15 PROCEDURE — G0277: CPT

## 2022-08-16 ENCOUNTER — APPOINTMENT (OUTPATIENT)
Dept: HYPERBARIC MEDICINE | Facility: HOSPITAL | Age: 67
End: 2022-08-16

## 2022-08-16 ENCOUNTER — OUTPATIENT (OUTPATIENT)
Dept: OUTPATIENT SERVICES | Facility: HOSPITAL | Age: 67
LOS: 1 days | Discharge: HOME | End: 2022-08-16
Payer: MEDICARE

## 2022-08-16 VITALS
RESPIRATION RATE: 18 BRPM | HEART RATE: 60 BPM | TEMPERATURE: 97.1 F | SYSTOLIC BLOOD PRESSURE: 136 MMHG | DIASTOLIC BLOOD PRESSURE: 72 MMHG | OXYGEN SATURATION: 96 %

## 2022-08-16 VITALS
OXYGEN SATURATION: 98 % | SYSTOLIC BLOOD PRESSURE: 138 MMHG | RESPIRATION RATE: 16 BRPM | HEART RATE: 74 BPM | TEMPERATURE: 98.9 F | DIASTOLIC BLOOD PRESSURE: 70 MMHG

## 2022-08-16 DIAGNOSIS — Z98.52 VASECTOMY STATUS: Chronic | ICD-10-CM

## 2022-08-16 DIAGNOSIS — M27.2 INFLAMMATORY CONDITIONS OF JAWS: ICD-10-CM

## 2022-08-16 PROCEDURE — 82962 GLUCOSE BLOOD TEST: CPT

## 2022-08-16 PROCEDURE — G0277: CPT

## 2022-08-16 PROCEDURE — 99183 HYPERBARIC OXYGEN THERAPY: CPT

## 2022-08-16 NOTE — ADDENDUM
[FreeTextEntry1] : The pt. presented to [facility] ambulatory and A&Ox3 for scheduled HBOT.\par \par The pt. was offered and declined positional measures prior to start of HBOT.\par The pt. was offered and declined EARPLANES prior to start of HBOT.\par \par The pt. reports his oral sx. would like him to continue HBOT series to 40 per [facility] parameters. \par The pt. was advised that he should relay this information to MD during verbal assessment post-HBOT.\par The pt. is to receive a verbal MD assessment post-HBOT.\par \par The pt's pre-dive screening was found to be within acceptable limits to begin HBOT.\par "HBO-TO" secondary PDS found the pt. fit to begin HBOTx2.\par The pt. descended @ 2.2 PSi/min. to Rx'd HBOT tx. depth of 2.4 PEACE in chamber # 4 without incident.\par The pt. was observed with visible chest motion and without incident for the duration of HBOT.\par The pt. was administered intermittent med. air over course of HBOT without incident. \par PT TOLERATED AIR BREAKS WELL\par PT ASCENDED FROM 2.4 PEACE @ 2.2 PSI/MIN WITHOUT INCIDENT\par PT TOLERATED TX WELL\par

## 2022-08-16 NOTE — PROCEDURE
[Outpatient] : Outpatient [Ambulatory] : Patient is ambulatory. [THIS CHAMBER HAS BEEN CLEANED / DISINFECTED] : This chamber has been cleaned / disinfected according to local and hospital policy and procedure prior to this treatment. [____] : Post-Dive: Time - [unfilled] [___] : Post-Dive: Value - [unfilled] mg/dL [Patient demonstrated and verbalized proper technique for using air break mask] : Patient demonstrated and verbalized proper technique for using air break mask [Patient educated on the risks of SMOKING prior to HBOT with understanding] : Patient educated on the risks of SMOKING prior to HBOT with understanding [Patient educated on the risks of CONSUMING ALCOHOL prior to HBOT with understanding] : Patient educated on the risks of CONSUMING ALCOHOL prior to HBOT with understanding [100% Cotton] : 100% cotton [Empty all pockets] : empty all pockets [No hair oils, wigs, hairpieces, pins] : no hair oils, wigs, hairpieces, pins  [Pre tx medications] : pre tx medications  [No make-up, creams] : no make-up, creams  [No jewelry] : no jewelry  [No matches, cigarettes, lighters] : no matches, cigarettes, lighters  [Hearing aid removed] : hearing aid removed [Dentures removed] : dentures removed [Ground bracelet on pt's wrist] : ground bracelet on pt's wrist  [Contacts removed] : contacts removed  [Remove nail polish] : remove nail polish  [No reading material] : no reading material  [Bra, undergarments removed] : bra, undergarments removed  [No contraindicated dressings] : no contraindicated dressings [Ground Wire - VISUAL Verification - Intact/Free of Obstruction] : Ground Wire - VISUAL Verification - Intact/Free of Obstruction  [Ground Continuity - Verified < 1 ohm w/ Wrist Strap Colton] : Ground Continuity - Verified < 1 ohm w/ Wrist Strap Colton [Diagnosis: ___] : Diagnosis: [unfilled] [Number: ___] : Number: [unfilled] [Clear all fields] : clear all fields [] : No [FreeTextEntry2] : MD Rx Verified [Hard Copy] [FreeTextEntry4] : 100 [FreeTextEntry6] : 16 : 19 [FreeTextEntry8] : 16 : 29 [de-identified] : 16 : 59 [de-identified] : 17 : 04 [de-identified] : 17 : 34 [de-identified] : 17 : 39 [de-identified] : 7758 [de-identified] : 1812 [de-identified] : 120 MINUTES

## 2022-08-16 NOTE — PROCEDURE
[Outpatient] : Outpatient [Ambulatory] : Patient is ambulatory. [THIS CHAMBER HAS BEEN CLEANED / DISINFECTED] : This chamber has been cleaned / disinfected according to local and hospital policy and procedure prior to this treatment. [Patient demonstrated and verbalized proper technique for using air break mask] : Patient demonstrated and verbalized proper technique for using air break mask [Patient educated on the risks of SMOKING prior to HBOT with understanding] : Patient educated on the risks of SMOKING prior to HBOT with understanding [Patient educated on the risks of CONSUMING ALCOHOL prior to HBOT with understanding] : Patient educated on the risks of CONSUMING ALCOHOL prior to HBOT with understanding [100% Cotton] : 100% cotton [Empty all pockets] : empty all pockets [No hair oils, wigs, hairpieces, pins] : no hair oils, wigs, hairpieces, pins  [Pre tx medications] : pre tx medications  [No make-up, creams] : no make-up, creams  [No jewelry] : no jewelry  [No matches, cigarettes, lighters] : no matches, cigarettes, lighters  [Hearing aid removed] : hearing aid removed [Dentures removed] : dentures removed [Ground bracelet on pt's wrist] : ground bracelet on pt's wrist  [Contacts removed] : contacts removed  [Remove nail polish] : remove nail polish  [No reading material] : no reading material  [Bra, undergarments removed] : bra, undergarments removed  [No contraindicated dressings] : no contraindicated dressings [Ground Wire - VISUAL Verification - Intact/Free of Obstruction] : Ground Wire - VISUAL Verification - Intact/Free of Obstruction  [Ground Continuity - Verified < 1 ohm w/ Wrist Strap Colton] : Ground Continuity - Verified < 1 ohm w/ Wrist Strap Colton [Number: ___] : Number: [unfilled] [Diagnosis: ___] : Diagnosis: [unfilled] [____] : Post-Dive: Time - [unfilled] [___] : Post-Dive: Value - [unfilled] mg/dL [Clear all fields] : clear all fields [] : No [FreeTextEntry2] : MD Rx Verified [Hard Copy] [FreeTextEntry4] : 100 [FreeTextEntry6] : 16 : 23 [FreeTextEntry8] : 16 : 33 [de-identified] : 17 : 03 [de-identified] : 17 : 08 [de-identified] : 17 : 38 [de-identified] : 17 : 43 [de-identified] : 18 : 13 [de-identified] : 1825 [de-identified] : 120 MINUTES

## 2022-08-16 NOTE — ADDENDUM
[FreeTextEntry1] : PT ARRIVED A&OX3 \par ALL VITALS WITHIN PARAMETERS FOR HBOT\par PT PRE-DIVE CHECKLIST SIGNED AND WITNESSED BY T\par \par The pt. was offered and declined positional/off-loading measures prior to start of HBOT.\par The pt. was offered and declined EARPLANES prior to start of HBOT.\par Pt. was reminded of physician assessment planned in addition to HBOT on MON., 08/15/22.\par The pt. was advised to seek advice from oral sx. re. need for additional HBOT at F/U appt. with oral sx. (planned 08/13/22) so that the pt's PoC can be shared with [facility] MD at assessment.\par \par The pt. reported removing and storing his dentures in his HBO locker prior to entering HBO suite. \par \par After the pt. was found fit to begin HBOTx2, the pt. descended @ 2.2 PSI/min. to Rx'd HBOT tx. depth of 2.4 PEACE in chamber # 3 without incident.\par The pt. was observed with visible chest motion and without incident for the duration of HBOT.\par The pt. was administered intermittent med. air over course of HBOT without incident. \par Transfer of Observation from [facility] to  at the start of the pt's ascent. \par PT TOLERATED AIR BREAKS WELL\par PT ASCENDED FROM 2.4 PEACE @ 2.2 PSI/MIN WITHOUT INCIDENT\par PT TOLERATED TX WELL\par

## 2022-08-17 ENCOUNTER — OUTPATIENT (OUTPATIENT)
Dept: OUTPATIENT SERVICES | Facility: HOSPITAL | Age: 67
LOS: 1 days | Discharge: HOME | End: 2022-08-17
Payer: MEDICARE

## 2022-08-17 ENCOUNTER — APPOINTMENT (OUTPATIENT)
Dept: HYPERBARIC MEDICINE | Facility: HOSPITAL | Age: 67
End: 2022-08-17

## 2022-08-17 VITALS
RESPIRATION RATE: 18 BRPM | TEMPERATURE: 97.2 F | HEART RATE: 66 BPM | SYSTOLIC BLOOD PRESSURE: 131 MMHG | DIASTOLIC BLOOD PRESSURE: 72 MMHG | OXYGEN SATURATION: 97 %

## 2022-08-17 VITALS
OXYGEN SATURATION: 99 % | RESPIRATION RATE: 18 BRPM | HEART RATE: 52 BPM | DIASTOLIC BLOOD PRESSURE: 74 MMHG | TEMPERATURE: 97.8 F | SYSTOLIC BLOOD PRESSURE: 148 MMHG

## 2022-08-17 DIAGNOSIS — M27.2 INFLAMMATORY CONDITIONS OF JAWS: ICD-10-CM

## 2022-08-17 DIAGNOSIS — Z98.52 VASECTOMY STATUS: Chronic | ICD-10-CM

## 2022-08-17 LAB — SARS-COV-2 RNA SPEC QL NAA+PROBE: SIGNIFICANT CHANGE UP

## 2022-08-17 PROCEDURE — G0277: CPT

## 2022-08-17 PROCEDURE — 99183 HYPERBARIC OXYGEN THERAPY: CPT

## 2022-08-17 PROCEDURE — U0005: CPT

## 2022-08-17 PROCEDURE — 82962 GLUCOSE BLOOD TEST: CPT

## 2022-08-17 PROCEDURE — U0003: CPT

## 2022-08-17 NOTE — ADDENDUM
[FreeTextEntry1] : PT ARRIVED A&OX3 \par ALL VITALS WITHIN PARAMETERS FOR HBOT WITH THE EXCEPTION OF BGL\par PT WAS GIVEN 16 GRAMS OF SUGAR VIA 2 JUICE\par PT WAS RETESTED AND FOUND TO BE WITHIN ACCEPTABLE PARAMETERS TO BEGIN HBOT \par PT PRE-DIVE CHECKLIST SIGNED AND WITNESSED BY CHT\par PT DESCENDED TO 2.4 PEACE @ 2.2 PSI/MIN IN CHAMBER #4  WITHOUT INCIDENT\par PT RESTING AT TX DEPTH WITH VISIBLE CHEST RISE AND FALL OBSERVED CHAMBER SIDE. \par Pt tolerated air breaks well. \par Pt ascended from 2.4 PEACE @ 2.2 PSI/min without incident. \par Pt tolerated tx well. Pt Recieved verbal assessment by MD post tx. \par \par

## 2022-08-17 NOTE — ASSESSMENT
[No change from previous assessment] : No change from previous assessment [Time MD/Provider assessed Patient:_______] : Time MD/Provider assessed Patient: [unfilled] [Patient prepared for dive] : Patient prepared for dive [Patient undergoing HBO treatment for __________] : Patient undergoing HBO treatment for [unfilled] [Patient descended without problem for 9 minutes] : Patient descended without problem for 9 minutes [No dizziness or thirst] :  No dizziness or thirst [No ear problems] : No ear problems [Vital signs stable] : Vital signs stable [Tolerating dive well] : Tolerating dive well [No Chest Pain, shortness of breath] : No Chest Pain, shortness of breath [Respiratory Rate Stable] : Respiratory Rate Stable [No chest pain, shortness of breath, or ear pain] :  No chest pain, shortness of breath, or ear pain  [Tolerated Ascent well] : Tolerated Ascent well [Vital Signs stable] : Vital Signs stable [A physician was present throughout the entire HBOT] : A physician was present throughout the entire HBOT [No] : No [Clinically Stable] : Clinically stable [Continue Treatment Plan] : Continue treatment plan [FreeTextEntry3] : pt seen after his HBO tx today. Tolerating tx well. Pt recently underwent oral surgery and oral surgeon is requesting an additional 10 HBO tx after completing his 30. Will get insur. authorization for this at this time.

## 2022-08-17 NOTE — PROCEDURE
[Outpatient] : Outpatient [Ambulatory] : Patient is ambulatory. [THIS CHAMBER HAS BEEN CLEANED / DISINFECTED] : This chamber has been cleaned / disinfected according to local and hospital policy and procedure prior to this treatment. [Patient demonstrated and verbalized proper technique for using air break mask] : Patient demonstrated and verbalized proper technique for using air break mask [Patient educated on the risks of SMOKING prior to HBOT with understanding] : Patient educated on the risks of SMOKING prior to HBOT with understanding [Patient educated on the risks of CONSUMING ALCOHOL prior to HBOT with understanding] : Patient educated on the risks of CONSUMING ALCOHOL prior to HBOT with understanding [100% Cotton] : 100% cotton [Empty all pockets] : empty all pockets [No hair oils, wigs, hairpieces, pins] : no hair oils, wigs, hairpieces, pins  [Pre tx medications] : pre tx medications  [No make-up, creams] : no make-up, creams  [No jewelry] : no jewelry  [No matches, cigarettes, lighters] : no matches, cigarettes, lighters  [Hearing aid removed] : hearing aid removed [Dentures removed] : dentures removed [Ground bracelet on pt's wrist] : ground bracelet on pt's wrist  [Contacts removed] : contacts removed  [Remove nail polish] : remove nail polish  [No reading material] : no reading material  [Bra, undergarments removed] : bra, undergarments removed  [No contraindicated dressings] : no contraindicated dressings [Ground Wire - VISUAL Verification - Intact/Free of Obstruction] : Ground Wire - VISUAL Verification - Intact/Free of Obstruction  [Ground Continuity - Verified < 1 ohm w/ Wrist Strap Colton] : Ground Continuity - Verified < 1 ohm w/ Wrist Strap Colton [Number: ___] : Number: [unfilled] [Diagnosis: ___] : Diagnosis: [unfilled] [____] : Recheck: Time - [unfilled] [___] : Recheck: Value - [unfilled] mg/dL [Clear all fields] : clear all fields [] : No [FreeTextEntry2] : 2.4 [FreeTextEntry4] : 100 Mins [FreeTextEntry6] : 1449 [[facility]] : 7739 [de-identified] : 4684 [de-identified] : 5291 [de-identified] : 2363 [de-identified] : 6664 [de-identified] : 1828 [de-identified] : 1839 [de-identified] : 120 Mins

## 2022-08-18 ENCOUNTER — APPOINTMENT (OUTPATIENT)
Dept: HYPERBARIC MEDICINE | Facility: HOSPITAL | Age: 67
End: 2022-08-18

## 2022-08-18 ENCOUNTER — OUTPATIENT (OUTPATIENT)
Dept: OUTPATIENT SERVICES | Facility: HOSPITAL | Age: 67
LOS: 1 days | Discharge: HOME | End: 2022-08-18
Payer: MEDICARE

## 2022-08-18 VITALS
DIASTOLIC BLOOD PRESSURE: 90 MMHG | RESPIRATION RATE: 20 BRPM | SYSTOLIC BLOOD PRESSURE: 139 MMHG | HEART RATE: 78 BPM | OXYGEN SATURATION: 100 % | TEMPERATURE: 97.5 F

## 2022-08-18 VITALS
DIASTOLIC BLOOD PRESSURE: 71 MMHG | SYSTOLIC BLOOD PRESSURE: 114 MMHG | OXYGEN SATURATION: 98 % | RESPIRATION RATE: 16 BRPM | HEART RATE: 62 BPM | TEMPERATURE: 98.6 F

## 2022-08-18 DIAGNOSIS — Z85.810 PERSONAL HISTORY OF MALIGNANT NEOPLASM OF TONGUE: ICD-10-CM

## 2022-08-18 DIAGNOSIS — Z20.822 CONTACT WITH AND (SUSPECTED) EXPOSURE TO COVID-19: ICD-10-CM

## 2022-08-18 DIAGNOSIS — M27.2 INFLAMMATORY CONDITIONS OF JAWS: ICD-10-CM

## 2022-08-18 DIAGNOSIS — Z98.52 VASECTOMY STATUS: Chronic | ICD-10-CM

## 2022-08-18 PROCEDURE — G0277: CPT

## 2022-08-18 PROCEDURE — 82962 GLUCOSE BLOOD TEST: CPT

## 2022-08-18 PROCEDURE — 99183 HYPERBARIC OXYGEN THERAPY: CPT

## 2022-08-18 NOTE — ADDENDUM
[FreeTextEntry1] : PTS PRE PROCEDURAL CHECKLIST WAS DONE BY CHT/MARYANN AND HT SUCCESSFULLY PRE HBOT\par pt descended to tx depth 2.4 nhung @2.2 psi/min without incident in chamber #4 \par pt's resting at tx depth with visible chest rise and fall as observed chamber side \par PT ASCENDED FROM TX DEPTH WITHOUT INCIDENT IN CHAMBER #4\par PT TOLERATED TX WELL\par Tolerated compression and decompression without incident\par

## 2022-08-18 NOTE — PROCEDURE
[Outpatient] : Outpatient [Ambulatory] : Patient is ambulatory. [THIS CHAMBER HAS BEEN CLEANED / DISINFECTED] : This chamber has been cleaned / disinfected according to local and hospital policy and procedure prior to this treatment. [Patient demonstrated and verbalized proper technique for using air break mask] : Patient demonstrated and verbalized proper technique for using air break mask [Patient educated on the risks of SMOKING prior to HBOT with understanding] : Patient educated on the risks of SMOKING prior to HBOT with understanding [Patient educated on the risks of CONSUMING ALCOHOL prior to HBOT with understanding] : Patient educated on the risks of CONSUMING ALCOHOL prior to HBOT with understanding [100% Cotton] : 100% cotton [Empty all pockets] : empty all pockets [No hair oils, wigs, hairpieces, pins] : no hair oils, wigs, hairpieces, pins  [Pre tx medications] : pre tx medications  [No make-up, creams] : no make-up, creams  [No jewelry] : no jewelry  [No matches, cigarettes, lighters] : no matches, cigarettes, lighters  [Hearing aid removed] : hearing aid removed [Dentures removed] : dentures removed [Ground bracelet on pt's wrist] : ground bracelet on pt's wrist  [Contacts removed] : contacts removed  [Remove nail polish] : remove nail polish  [No reading material] : no reading material  [Bra, undergarments removed] : bra, undergarments removed  [No contraindicated dressings] : no contraindicated dressings [Ground Wire - VISUAL Verification - Intact/Free of Obstruction] : Ground Wire - VISUAL Verification - Intact/Free of Obstruction  [Ground Continuity - Verified < 1 ohm w/ Wrist Strap Colton] : Ground Continuity - Verified < 1 ohm w/ Wrist Strap Colton [Number: ___] : Number: [unfilled] [Diagnosis: ___] : Diagnosis: [unfilled] [____] : Post-Dive: Time - [unfilled] [___] : Post-Dive: Value - [unfilled] mg/dL [Clear all fields] : clear all fields [] : No [FreeTextEntry4] : 100 [FreeTextEntry6] : 1657 [FreeTextEntry8] : 0963 [de-identified] : 3125 [de-identified] : 6860 [de-identified] : 3248 [de-identified] : 2406 [de-identified] : 8845 [de-identified] : 2735 [de-identified] : 120 MINUTES

## 2022-08-18 NOTE — PROCEDURE
[Outpatient] : Outpatient [Ambulatory] : Patient is ambulatory. [THIS CHAMBER HAS BEEN CLEANED / DISINFECTED] : This chamber has been cleaned / disinfected according to local and hospital policy and procedure prior to this treatment. [Patient demonstrated and verbalized proper technique for using air break mask] : Patient demonstrated and verbalized proper technique for using air break mask [Patient educated on the risks of SMOKING prior to HBOT with understanding] : Patient educated on the risks of SMOKING prior to HBOT with understanding [Patient educated on the risks of CONSUMING ALCOHOL prior to HBOT with understanding] : Patient educated on the risks of CONSUMING ALCOHOL prior to HBOT with understanding [100% Cotton] : 100% cotton [Empty all pockets] : empty all pockets [No hair oils, wigs, hairpieces, pins] : no hair oils, wigs, hairpieces, pins  [Pre tx medications] : pre tx medications  [No make-up, creams] : no make-up, creams  [No jewelry] : no jewelry  [No matches, cigarettes, lighters] : no matches, cigarettes, lighters  [Hearing aid removed] : hearing aid removed [Dentures removed] : dentures removed [Ground bracelet on pt's wrist] : ground bracelet on pt's wrist  [Contacts removed] : contacts removed  [Remove nail polish] : remove nail polish  [No reading material] : no reading material  [Bra, undergarments removed] : bra, undergarments removed  [No contraindicated dressings] : no contraindicated dressings [Ground Wire - VISUAL Verification - Intact/Free of Obstruction] : Ground Wire - VISUAL Verification - Intact/Free of Obstruction  [Ground Continuity - Verified < 1 ohm w/ Wrist Strap Colton] : Ground Continuity - Verified < 1 ohm w/ Wrist Strap Colton [Number: ___] : Number: [unfilled] [Diagnosis: ___] : Diagnosis: [unfilled] [____] : Post-Dive: Time - [unfilled] [___] : Post-Dive: Value - [unfilled] mg/dL [Clear all fields] : clear all fields [] : No [FreeTextEntry4] : 100 [FreeTextEntry6] : 2202 [[facility]atEntry8] : 5357 [de-identified] : 3537 [de-identified] : 5897 [de-identified] : 2527 [de-identified] : 0598 [de-identified] : 182 [de-identified] : 1831 [de-identified] : 120 MINUTES

## 2022-08-18 NOTE — ADDENDUM
[FreeTextEntry1] : PT ARRIVED A&OX3 \par ALL VITALS WITHIN PARAMETERS FOR HBOT WITH THE EXCEPTION OF BGL\par PT WAS GIVEN 16 GRAMS OF SUGAR VIA 2 JUICE, MD NOTIFIED\par PT RETESTED AND FOUND TO BE WITHIN ACCEPTABLE PARAMETERS TO BEGIN HBOT\par PT PRE-DIVE CHECKLIST SIGNED AND WITNESSED BY CHT\par PT DESCENDED TO 2.4 PEACE @ 2.2 PSI/MIN IN CHAMBER #2  WITHOUT INCIDENT\par PT RESTING AT TX DEPTH WITH VISIBLE CHEST RISE AND FALL OBSERVED CHAMBER SIDE\par PT TOLERATED AIR BREAKS WELL\par PT ASCENDED FROM 2.4 PEACE @ 2.2 PSI/MIN WITHOUT INCIDENT\par PT TOLERATED TX WELL\par

## 2022-08-19 ENCOUNTER — APPOINTMENT (OUTPATIENT)
Dept: HYPERBARIC MEDICINE | Facility: HOSPITAL | Age: 67
End: 2022-08-19

## 2022-08-21 DIAGNOSIS — M27.2 INFLAMMATORY CONDITIONS OF JAWS: ICD-10-CM

## 2022-08-21 DIAGNOSIS — Z85.810 PERSONAL HISTORY OF MALIGNANT NEOPLASM OF TONGUE: ICD-10-CM

## 2022-08-22 ENCOUNTER — OUTPATIENT (OUTPATIENT)
Dept: OUTPATIENT SERVICES | Facility: HOSPITAL | Age: 67
LOS: 1 days | Discharge: HOME | End: 2022-08-22
Payer: MEDICARE

## 2022-08-22 ENCOUNTER — APPOINTMENT (OUTPATIENT)
Dept: HYPERBARIC MEDICINE | Facility: HOSPITAL | Age: 67
End: 2022-08-22

## 2022-08-22 VITALS
HEART RATE: 67 BPM | TEMPERATURE: 98 F | RESPIRATION RATE: 16 BRPM | DIASTOLIC BLOOD PRESSURE: 94 MMHG | SYSTOLIC BLOOD PRESSURE: 158 MMHG | OXYGEN SATURATION: 99 %

## 2022-08-22 VITALS
SYSTOLIC BLOOD PRESSURE: 122 MMHG | TEMPERATURE: 97.1 F | RESPIRATION RATE: 18 BRPM | OXYGEN SATURATION: 96 % | DIASTOLIC BLOOD PRESSURE: 67 MMHG | HEART RATE: 53 BPM

## 2022-08-22 DIAGNOSIS — M27.2 INFLAMMATORY CONDITIONS OF JAWS: ICD-10-CM

## 2022-08-22 DIAGNOSIS — Z85.810 PERSONAL HISTORY OF MALIGNANT NEOPLASM OF TONGUE: ICD-10-CM

## 2022-08-22 DIAGNOSIS — Z98.52 VASECTOMY STATUS: Chronic | ICD-10-CM

## 2022-08-22 PROCEDURE — G0277: CPT

## 2022-08-22 PROCEDURE — 99183 HYPERBARIC OXYGEN THERAPY: CPT

## 2022-08-22 PROCEDURE — 82962 GLUCOSE BLOOD TEST: CPT

## 2022-08-23 ENCOUNTER — APPOINTMENT (OUTPATIENT)
Dept: HYPERBARIC MEDICINE | Facility: HOSPITAL | Age: 67
End: 2022-08-23

## 2022-08-23 ENCOUNTER — OUTPATIENT (OUTPATIENT)
Dept: OUTPATIENT SERVICES | Facility: HOSPITAL | Age: 67
LOS: 1 days | Discharge: HOME | End: 2022-08-23
Payer: MEDICARE

## 2022-08-23 VITALS
RESPIRATION RATE: 18 BRPM | TEMPERATURE: 98 F | DIASTOLIC BLOOD PRESSURE: 71 MMHG | OXYGEN SATURATION: 100 % | SYSTOLIC BLOOD PRESSURE: 134 MMHG | HEART RATE: 67 BPM

## 2022-08-23 VITALS
RESPIRATION RATE: 16 BRPM | HEART RATE: 74 BPM | SYSTOLIC BLOOD PRESSURE: 138 MMHG | DIASTOLIC BLOOD PRESSURE: 74 MMHG | TEMPERATURE: 98.6 F | OXYGEN SATURATION: 98 %

## 2022-08-23 DIAGNOSIS — M27.2 INFLAMMATORY CONDITIONS OF JAWS: ICD-10-CM

## 2022-08-23 DIAGNOSIS — Z98.52 VASECTOMY STATUS: Chronic | ICD-10-CM

## 2022-08-23 PROCEDURE — 82962 GLUCOSE BLOOD TEST: CPT

## 2022-08-23 PROCEDURE — G0277: CPT

## 2022-08-23 PROCEDURE — 99183 HYPERBARIC OXYGEN THERAPY: CPT

## 2022-08-23 NOTE — ADDENDUM
[FreeTextEntry1] : PT ARRIVED A&OX3 \par ALL VITALS WITHIN PARAMETERS FOR HBOT\par PT PRE-DIVE CHECKLIST SIGNED AND WITNESSED BY CHT\par PT DESCENDED TO 2.4 PEACE @ 2.2 PSI/MIN IN CHAMBER #4  WITHOUT INCIDENT\par PT RESTING AT TX DEPTH WITH VISIBLE CHEST RISE AND FALL OBSERVED CHAMBER SIDE\par Transfer of Care from  to [facility] upon pt's ascent.\par The pt. ascended from tx. depth to surface without incident.\par The pt. tolerated HBOT without incident.\par \par Post-HBOT, the pt. questioned reason why his systolic BP had risen from pre-tx. value.\par Pt. was explained the effect of HBOT on BP x CHT to pt's satisfaction.\par Pt. further reported improvement in his nearsightedness, advising that he no longer requires corrective lenses for computer use and other small-print tasks.\par The pt. was advised of short-term effects of vision changes r/t HBOT, and that his vision will return to his pre-HBOT series quality after a period of time upon concluding HBOT.\par Pt. expressed understanding of both HBOT-related topics discussed with CHT.\par \par The pt. exited HBO suite to HBO locker and then to building exit ambulatory and independently.

## 2022-08-23 NOTE — PROCEDURE
[Outpatient] : Outpatient [Ambulatory] : Patient is ambulatory. [THIS CHAMBER HAS BEEN CLEANED / DISINFECTED] : This chamber has been cleaned / disinfected according to local and hospital policy and procedure prior to this treatment. [Patient demonstrated and verbalized proper technique for using air break mask] : Patient demonstrated and verbalized proper technique for using air break mask [Patient educated on the risks of SMOKING prior to HBOT with understanding] : Patient educated on the risks of SMOKING prior to HBOT with understanding [Patient educated on the risks of CONSUMING ALCOHOL prior to HBOT with understanding] : Patient educated on the risks of CONSUMING ALCOHOL prior to HBOT with understanding [100% Cotton] : 100% cotton [Empty all pockets] : empty all pockets [No hair oils, wigs, hairpieces, pins] : no hair oils, wigs, hairpieces, pins  [Pre tx medications] : pre tx medications  [No make-up, creams] : no make-up, creams  [No jewelry] : no jewelry  [No matches, cigarettes, lighters] : no matches, cigarettes, lighters  [Hearing aid removed] : hearing aid removed [Dentures removed] : dentures removed [Ground bracelet on pt's wrist] : ground bracelet on pt's wrist  [Contacts removed] : contacts removed  [Remove nail polish] : remove nail polish  [No reading material] : no reading material  [Bra, undergarments removed] : bra, undergarments removed  [No contraindicated dressings] : no contraindicated dressings [Ground Wire - VISUAL Verification - Intact/Free of Obstruction] : Ground Wire - VISUAL Verification - Intact/Free of Obstruction  [Ground Continuity - Verified < 1 ohm w/ Wrist Strap Colton] : Ground Continuity - Verified < 1 ohm w/ Wrist Strap Colton [Number: ___] : Number: [unfilled] [Diagnosis: ___] : Diagnosis: [unfilled] [____] : Post-Dive: Time - [unfilled] [___] : Post-Dive: Value - [unfilled] mg/dL [Clear all fields] : clear all fields [] : No [FreeTextEntry4] : 100 [FreeTextEntry6] : 4795 [[facility] Cent[facility]Entry8] : 4844 [de-identified] : 2282 [de-identified] : 5190 [de-identified] : 5349 [de-identified] : 8936 [de-identified] : 1811 [de-identified] : 18 : 22 [de-identified] : 120 min.

## 2022-08-24 ENCOUNTER — OUTPATIENT (OUTPATIENT)
Dept: OUTPATIENT SERVICES | Facility: HOSPITAL | Age: 67
LOS: 1 days | Discharge: HOME | End: 2022-08-24
Payer: MEDICARE

## 2022-08-24 ENCOUNTER — APPOINTMENT (OUTPATIENT)
Dept: HYPERBARIC MEDICINE | Facility: HOSPITAL | Age: 67
End: 2022-08-24

## 2022-08-24 VITALS
SYSTOLIC BLOOD PRESSURE: 116 MMHG | DIASTOLIC BLOOD PRESSURE: 73 MMHG | OXYGEN SATURATION: 99 % | TEMPERATURE: 97.5 F | HEART RATE: 59 BPM | RESPIRATION RATE: 20 BRPM

## 2022-08-24 VITALS
RESPIRATION RATE: 20 BRPM | TEMPERATURE: 97.1 F | SYSTOLIC BLOOD PRESSURE: 140 MMHG | DIASTOLIC BLOOD PRESSURE: 76 MMHG | OXYGEN SATURATION: 98 % | HEART RATE: 67 BPM

## 2022-08-24 DIAGNOSIS — M27.2 INFLAMMATORY CONDITIONS OF JAWS: ICD-10-CM

## 2022-08-24 DIAGNOSIS — Z98.52 VASECTOMY STATUS: Chronic | ICD-10-CM

## 2022-08-24 PROCEDURE — U0003: CPT

## 2022-08-24 PROCEDURE — U0005: CPT

## 2022-08-24 PROCEDURE — 82962 GLUCOSE BLOOD TEST: CPT

## 2022-08-24 PROCEDURE — 99183 HYPERBARIC OXYGEN THERAPY: CPT

## 2022-08-24 PROCEDURE — G0277: CPT

## 2022-08-25 ENCOUNTER — OUTPATIENT (OUTPATIENT)
Dept: OUTPATIENT SERVICES | Facility: HOSPITAL | Age: 67
LOS: 1 days | Discharge: HOME | End: 2022-08-25
Payer: MEDICARE

## 2022-08-25 ENCOUNTER — APPOINTMENT (OUTPATIENT)
Dept: HYPERBARIC MEDICINE | Facility: HOSPITAL | Age: 67
End: 2022-08-25

## 2022-08-25 VITALS
HEART RATE: 82 BPM | SYSTOLIC BLOOD PRESSURE: 154 MMHG | DIASTOLIC BLOOD PRESSURE: 78 MMHG | OXYGEN SATURATION: 99 % | RESPIRATION RATE: 18 BRPM | TEMPERATURE: 97.6 F

## 2022-08-25 DIAGNOSIS — Z85.810 PERSONAL HISTORY OF MALIGNANT NEOPLASM OF TONGUE: ICD-10-CM

## 2022-08-25 DIAGNOSIS — Z98.52 VASECTOMY STATUS: Chronic | ICD-10-CM

## 2022-08-25 DIAGNOSIS — M27.2 INFLAMMATORY CONDITIONS OF JAWS: ICD-10-CM

## 2022-08-25 DIAGNOSIS — Z20.822 CONTACT WITH AND (SUSPECTED) EXPOSURE TO COVID-19: ICD-10-CM

## 2022-08-25 LAB — SARS-COV-2 RNA SPEC QL NAA+PROBE: SIGNIFICANT CHANGE UP

## 2022-08-25 PROCEDURE — 82962 GLUCOSE BLOOD TEST: CPT

## 2022-08-25 PROCEDURE — 99183 HYPERBARIC OXYGEN THERAPY: CPT

## 2022-08-25 PROCEDURE — G0277: CPT

## 2022-08-25 NOTE — PROCEDURE
[Outpatient] : Outpatient [Ambulatory] : Patient is ambulatory. [THIS CHAMBER HAS BEEN CLEANED / DISINFECTED] : This chamber has been cleaned / disinfected according to local and hospital policy and procedure prior to this treatment. [Patient demonstrated and verbalized proper technique for using air break mask] : Patient demonstrated and verbalized proper technique for using air break mask [Patient educated on the risks of SMOKING prior to HBOT with understanding] : Patient educated on the risks of SMOKING prior to HBOT with understanding [Patient educated on the risks of CONSUMING ALCOHOL prior to HBOT with understanding] : Patient educated on the risks of CONSUMING ALCOHOL prior to HBOT with understanding [100% Cotton] : 100% cotton [Empty all pockets] : empty all pockets [No hair oils, wigs, hairpieces, pins] : no hair oils, wigs, hairpieces, pins  [Pre tx medications] : pre tx medications  [No make-up, creams] : no make-up, creams  [No jewelry] : no jewelry  [No matches, cigarettes, lighters] : no matches, cigarettes, lighters  [Hearing aid removed] : hearing aid removed [Dentures removed] : dentures removed [Ground bracelet on pt's wrist] : ground bracelet on pt's wrist  [Contacts removed] : contacts removed  [Remove nail polish] : remove nail polish  [No reading material] : no reading material  [Bra, undergarments removed] : bra, undergarments removed  [No contraindicated dressings] : no contraindicated dressings [Ground Wire - VISUAL Verification - Intact/Free of Obstruction] : Ground Wire - VISUAL Verification - Intact/Free of Obstruction  [Ground Continuity - Verified < 1 ohm w/ Wrist Strap Colton] : Ground Continuity - Verified < 1 ohm w/ Wrist Strap Colton [Clear all fields] : clear all fields [Number: ___] : Number: [unfilled] [Diagnosis: ___] : Diagnosis: [unfilled] [____] : Post-Dive: Time - [unfilled] [___] : Post-Dive: Value - [unfilled] mg/dL [] : No [FreeTextEntry2] : 2.4 [FreeTextEntry4] : 100 mins [FreeTextEntry6] : 4821 [FreeTextEntry8] : 1439 [de-identified] : 1065 [de-identified] : 5623 [de-identified] : 9351 [de-identified] : 7644 [de-identified] : 1759 [de-identified] : 1138 [de-identified] : 120 mins

## 2022-08-25 NOTE — ADDENDUM
[FreeTextEntry1] : pt vitals were within acceptable parameters to begin hbot \par pt descended to tx depth 2.4 nhung @2.2 psi/min without incident in chamber #4 \par pt's resting at tx depth with visible chest rise and fall as observed chamber side. \par Pt tolerated air breaks well. \par Pt ascended from 2.4 NHUNG @ 2.2 PSI/min without incident. \par Pt tolerated tx well.\par

## 2022-08-26 ENCOUNTER — APPOINTMENT (OUTPATIENT)
Dept: HYPERBARIC MEDICINE | Facility: HOSPITAL | Age: 67
End: 2022-08-26

## 2022-08-26 ENCOUNTER — OUTPATIENT (OUTPATIENT)
Dept: OUTPATIENT SERVICES | Facility: HOSPITAL | Age: 67
LOS: 1 days | Discharge: HOME | End: 2022-08-26
Payer: MEDICARE

## 2022-08-26 VITALS
OXYGEN SATURATION: 99 % | RESPIRATION RATE: 18 BRPM | DIASTOLIC BLOOD PRESSURE: 83 MMHG | SYSTOLIC BLOOD PRESSURE: 149 MMHG | TEMPERATURE: 97.8 F | HEART RATE: 86 BPM

## 2022-08-26 VITALS
RESPIRATION RATE: 18 BRPM | DIASTOLIC BLOOD PRESSURE: 79 MMHG | TEMPERATURE: 97.8 F | HEART RATE: 68 BPM | OXYGEN SATURATION: 96 % | SYSTOLIC BLOOD PRESSURE: 149 MMHG

## 2022-08-26 DIAGNOSIS — Z98.52 VASECTOMY STATUS: Chronic | ICD-10-CM

## 2022-08-26 DIAGNOSIS — M27.2 INFLAMMATORY CONDITIONS OF JAWS: ICD-10-CM

## 2022-08-26 PROCEDURE — 99183 HYPERBARIC OXYGEN THERAPY: CPT

## 2022-08-26 PROCEDURE — G0277: CPT

## 2022-08-26 PROCEDURE — 82962 GLUCOSE BLOOD TEST: CPT

## 2022-08-26 NOTE — ASSESSMENT
[No change from previous assessment] : No change from previous assessment [Time MD/Provider assessed Patient:_______] : Time MD/Provider assessed Patient: [unfilled] [Patient undergoing HBO treatment for __________] : Patient undergoing HBO treatment for [unfilled] [Patient descended without problem for 9 minutes] : Patient descended without problem for 9 minutes [No dizziness or thirst] :  No dizziness or thirst [No ear problems] : No ear problems [Vital signs stable] : Vital signs stable [Tolerating dive well] : Tolerating dive well [No Chest Pain, shortness of breath] : No Chest Pain, shortness of breath [Respiratory Rate Stable] : Respiratory Rate Stable [No chest pain, shortness of breath, or ear pain] :  No chest pain, shortness of breath, or ear pain  [Tolerated Ascent well] : Tolerated Ascent well [Vital Signs stable] : Vital Signs stable [A physician was present throughout the entire HBOT] : A physician was present throughout the entire HBOT [No] : No [Clinically Stable] : Clinically stable [Continue Treatment Plan] : Continue treatment plan

## 2022-08-27 ENCOUNTER — NON-APPOINTMENT (OUTPATIENT)
Age: 67
End: 2022-08-27

## 2022-08-27 ENCOUNTER — APPOINTMENT (OUTPATIENT)
Dept: HYPERBARIC MEDICINE | Facility: HOSPITAL | Age: 67
End: 2022-08-27

## 2022-08-27 ENCOUNTER — OUTPATIENT (OUTPATIENT)
Dept: OUTPATIENT SERVICES | Facility: HOSPITAL | Age: 67
LOS: 1 days | Discharge: HOME | End: 2022-08-27
Payer: MEDICARE

## 2022-08-27 VITALS
OXYGEN SATURATION: 99 % | RESPIRATION RATE: 16 BRPM | DIASTOLIC BLOOD PRESSURE: 74 MMHG | SYSTOLIC BLOOD PRESSURE: 130 MMHG | TEMPERATURE: 98.6 F | HEART RATE: 74 BPM

## 2022-08-27 VITALS
OXYGEN SATURATION: 100 % | RESPIRATION RATE: 20 BRPM | TEMPERATURE: 97.9 F | DIASTOLIC BLOOD PRESSURE: 71 MMHG | HEART RATE: 60 BPM | SYSTOLIC BLOOD PRESSURE: 119 MMHG

## 2022-08-27 DIAGNOSIS — Z85.810 PERSONAL HISTORY OF MALIGNANT NEOPLASM OF TONGUE: ICD-10-CM

## 2022-08-27 DIAGNOSIS — M27.2 INFLAMMATORY CONDITIONS OF JAWS: ICD-10-CM

## 2022-08-27 DIAGNOSIS — Z98.52 VASECTOMY STATUS: Chronic | ICD-10-CM

## 2022-08-27 PROCEDURE — 82962 GLUCOSE BLOOD TEST: CPT

## 2022-08-27 PROCEDURE — G0277: CPT

## 2022-08-27 PROCEDURE — 99183 HYPERBARIC OXYGEN THERAPY: CPT

## 2022-08-27 NOTE — ASSESSMENT
[No change from previous assessment] : No change from previous assessment [Time MD/Provider assessed Patient:_______] : Time MD/Provider assessed Patient: [unfilled] [Patient undergoing HBO treatment for __________] : Patient undergoing HBO treatment for [unfilled] [Patient descended without problem for 9 minutes] : Patient descended without problem for 9 minutes [No dizziness or thirst] :  No dizziness or thirst [No ear problems] : No ear problems [Vital signs stable] : Vital signs stable [Tolerating dive well] : Tolerating dive well [No Chest Pain, shortness of breath] : No Chest Pain, shortness of breath [Respiratory Rate Stable] : Respiratory Rate Stable [No chest pain, shortness of breath, or ear pain] :  No chest pain, shortness of breath, or ear pain  [Tolerated Ascent well] : Tolerated Ascent well [Vital Signs stable] : Vital Signs stable [A physician was present throughout the entire HBOT] : A physician was present throughout the entire HBOT [No] : No [Clinically Stable] : Clinically stable [Continue Treatment Plan] : Continue treatment plan [Patient prepared for dive] : Patient prepared for dive

## 2022-08-27 NOTE — PROCEDURE
[Outpatient] : Outpatient [Ambulatory] : Patient is ambulatory. [THIS CHAMBER HAS BEEN CLEANED / DISINFECTED] : This chamber has been cleaned / disinfected according to local and hospital policy and procedure prior to this treatment. [____] : Pre-Dive: Time - [unfilled] [___] : Pre-Dive: Value - [unfilled] mg/dL [Patient demonstrated and verbalized proper technique for using air break mask] : Patient demonstrated and verbalized proper technique for using air break mask [Patient educated on the risks of SMOKING prior to HBOT with understanding] : Patient educated on the risks of SMOKING prior to HBOT with understanding [Patient educated on the risks of CONSUMING ALCOHOL prior to HBOT with understanding] : Patient educated on the risks of CONSUMING ALCOHOL prior to HBOT with understanding [100% Cotton] : 100% cotton [Empty all pockets] : empty all pockets [No hair oils, wigs, hairpieces, pins] : no hair oils, wigs, hairpieces, pins  [Pre tx medications] : pre tx medications  [No make-up, creams] : no make-up, creams  [No jewelry] : no jewelry  [No matches, cigarettes, lighters] : no matches, cigarettes, lighters  [Hearing aid removed] : hearing aid removed [Dentures removed] : dentures removed [Ground bracelet on pt's wrist] : ground bracelet on pt's wrist  [Contacts removed] : contacts removed  [Remove nail polish] : remove nail polish  [No reading material] : no reading material  [Bra, undergarments removed] : bra, undergarments removed  [No contraindicated dressings] : no contraindicated dressings [Ground Wire - VISUAL Verification - Intact/Free of Obstruction] : Ground Wire - VISUAL Verification - Intact/Free of Obstruction  [Ground Continuity - Verified < 1 ohm w/ Wrist Strap Colton] : Ground Continuity - Verified < 1 ohm w/ Wrist Strap Colton [Number: ___] : Number: [unfilled] [Diagnosis: ___] : Diagnosis: [unfilled] [Clear all fields] : clear all fields [] : No [FreeTextEntry4] : 100 minutes [FreeTextEntry6] : 0196 [FreeTextEntry8] : 1523 [de-identified] : 6191 [de-identified] : 4595 [de-identified] : 8163 [de-identified] : 1409 [de-identified] : 9579 [de-identified] : 5322 [de-identified] : 120 minutes

## 2022-08-27 NOTE — ADDENDUM
[FreeTextEntry1] : pt descended to tx depth 2.4 nhung @2.2 psi/min without incident in chamber #2 \par pt's resting at tx depth with visible chest rise and fall as observed chamber side \par pt tolerated both intermediate air breaks well\par pt ascended from tx depth without incident in chamber #2 \par pt reminded of HBOT tomorrow \par pt acknowledged of same above

## 2022-08-29 ENCOUNTER — OUTPATIENT (OUTPATIENT)
Dept: OUTPATIENT SERVICES | Facility: HOSPITAL | Age: 67
LOS: 1 days | Discharge: HOME | End: 2022-08-29
Payer: MEDICARE

## 2022-08-29 ENCOUNTER — APPOINTMENT (OUTPATIENT)
Dept: HYPERBARIC MEDICINE | Facility: HOSPITAL | Age: 67
End: 2022-08-29

## 2022-08-29 VITALS
TEMPERATURE: 97.2 F | OXYGEN SATURATION: 97 % | RESPIRATION RATE: 16 BRPM | HEART RATE: 52 BPM | SYSTOLIC BLOOD PRESSURE: 129 MMHG | DIASTOLIC BLOOD PRESSURE: 66 MMHG

## 2022-08-29 VITALS
HEART RATE: 64 BPM | SYSTOLIC BLOOD PRESSURE: 126 MMHG | RESPIRATION RATE: 18 BRPM | DIASTOLIC BLOOD PRESSURE: 68 MMHG | TEMPERATURE: 97.4 F | OXYGEN SATURATION: 99 %

## 2022-08-29 DIAGNOSIS — M27.2 INFLAMMATORY CONDITIONS OF JAWS: ICD-10-CM

## 2022-08-29 DIAGNOSIS — Z98.52 VASECTOMY STATUS: Chronic | ICD-10-CM

## 2022-08-29 PROCEDURE — 99183 HYPERBARIC OXYGEN THERAPY: CPT

## 2022-08-29 PROCEDURE — G0277: CPT

## 2022-08-29 PROCEDURE — 82962 GLUCOSE BLOOD TEST: CPT

## 2022-08-30 ENCOUNTER — OUTPATIENT (OUTPATIENT)
Dept: OUTPATIENT SERVICES | Facility: HOSPITAL | Age: 67
LOS: 1 days | Discharge: HOME | End: 2022-08-30
Payer: MEDICARE

## 2022-08-30 ENCOUNTER — APPOINTMENT (OUTPATIENT)
Dept: HYPERBARIC MEDICINE | Facility: HOSPITAL | Age: 67
End: 2022-08-30

## 2022-08-30 VITALS
SYSTOLIC BLOOD PRESSURE: 114 MMHG | DIASTOLIC BLOOD PRESSURE: 56 MMHG | TEMPERATURE: 98.3 F | RESPIRATION RATE: 16 BRPM | HEART RATE: 74 BPM | OXYGEN SATURATION: 99 %

## 2022-08-30 VITALS
DIASTOLIC BLOOD PRESSURE: 84 MMHG | RESPIRATION RATE: 18 BRPM | TEMPERATURE: 98.4 F | HEART RATE: 69 BPM | OXYGEN SATURATION: 100 % | SYSTOLIC BLOOD PRESSURE: 128 MMHG

## 2022-08-30 DIAGNOSIS — M27.2 INFLAMMATORY CONDITIONS OF JAWS: ICD-10-CM

## 2022-08-30 DIAGNOSIS — Z86.16 PERSONAL HISTORY OF COVID-19: ICD-10-CM

## 2022-08-30 DIAGNOSIS — Z79.899 OTHER LONG TERM (CURRENT) DRUG THERAPY: ICD-10-CM

## 2022-08-30 DIAGNOSIS — Z85.810 PERSONAL HISTORY OF MALIGNANT NEOPLASM OF TONGUE: ICD-10-CM

## 2022-08-30 DIAGNOSIS — Z79.82 LONG TERM (CURRENT) USE OF ASPIRIN: ICD-10-CM

## 2022-08-30 DIAGNOSIS — Z79.890 HORMONE REPLACEMENT THERAPY: ICD-10-CM

## 2022-08-30 DIAGNOSIS — Z95.810 PRESENCE OF AUTOMATIC (IMPLANTABLE) CARDIAC DEFIBRILLATOR: ICD-10-CM

## 2022-08-30 DIAGNOSIS — Z98.52 VASECTOMY STATUS: Chronic | ICD-10-CM

## 2022-08-30 DIAGNOSIS — E03.9 HYPOTHYROIDISM, UNSPECIFIED: ICD-10-CM

## 2022-08-30 DIAGNOSIS — E78.00 PURE HYPERCHOLESTEROLEMIA, UNSPECIFIED: ICD-10-CM

## 2022-08-30 DIAGNOSIS — Z80.1 FAMILY HISTORY OF MALIGNANT NEOPLASM OF TRACHEA, BRONCHUS AND LUNG: ICD-10-CM

## 2022-08-30 DIAGNOSIS — Z80.8 FAMILY HISTORY OF MALIGNANT NEOPLASM OF OTHER ORGANS OR SYSTEMS: ICD-10-CM

## 2022-08-30 DIAGNOSIS — Z98.890 OTHER SPECIFIED POSTPROCEDURAL STATES: ICD-10-CM

## 2022-08-30 DIAGNOSIS — Z80.3 FAMILY HISTORY OF MALIGNANT NEOPLASM OF BREAST: ICD-10-CM

## 2022-08-30 PROCEDURE — G0277: CPT

## 2022-08-30 PROCEDURE — 82962 GLUCOSE BLOOD TEST: CPT

## 2022-08-30 PROCEDURE — 99183 HYPERBARIC OXYGEN THERAPY: CPT

## 2022-08-30 NOTE — REVIEW OF SYSTEMS
[Negative] : Heme/Lymph [FreeTextEntry2] : had covid [FreeTextEntry5] : hyperlipidemia [FreeTextEntry7] : Squamous cell Ca of the tongue [de-identified] : hypothyroid

## 2022-08-30 NOTE — PLAN
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 Tolerating well\par continue HBO\par 20 minutes spent with patient in review, evaluation and treatment planning

## 2022-08-30 NOTE — HISTORY OF PRESENT ILLNESS
[FreeTextEntry1] : Pt states he was diagnosed with Squamous Cell Carcinoma 2010 & underwent Radiation Therapy (35 txs) & has been having dental problems & has to undergo Tooth Extractions related to ORN- Pt states he was recommended by his Oral surgeon (Dr Leobardo Valenzuela [phone]) & Radiation Oncologist (Dr Chase Patrick [phone]) to undergo HBOT prior to Oral Surgery\par 8/29/22 doing well in second stage of Evaristo protocol

## 2022-08-30 NOTE — REVIEW OF SYSTEMS
[Negative] : Heme/Lymph [FreeTextEntry2] : had covid [FreeTextEntry5] : hyperlipidemia [FreeTextEntry7] : Squamous cell Ca of the tongue [de-identified] : hypothyroid

## 2022-08-30 NOTE — ADDENDUM
[FreeTextEntry1] : The pt. presented to [facility] ambulatory and A&Ox3 for scheduled HBOT.\par \par The pt. was offered and declined positional/off-loading measures prior to start of HBOT.\par The pt. will be seen x MD and Nurse post-HBOT for vbl. assessment.\par \par The pt's pre-dive screening was found to be within acceptable limits to begin HBOT.\par "HBO-TO" secondary PDS performed x CHT found the pt. fit to begin HBOTx2.\par The pt. reports removing his dentures and locking same in HBO locker prior to entering HBO suite. \par The  pt. descended @ 2.2 PSI/min. to Rx'd HBOT tx. depth of 2.4 PEACE in chamber # 4 without incident.\par The pt. was observed with visible chest motion and without incident for the duration of HBOT.\par The pt. was administered intermittent med. air over course of HBOT without incident.\par The pt. ascended from tx. depth to surface without incident.\par The pt. tolerated HBOT without incident.\par The pt. received MD assessment post-HBOT.\par The pt. exited HBO suite ambulatory and independently. \par Pt reports F/U scheduled with his oral sx. this Thurs., 09/01/22 ; same was reported to MD and MARYANN.\par Pt. questioned need for additional HBOT for future oral sx. Pt. was advised that performing surgeon will make that determination based on pt's clinical presentation and exposure of various portions of mouth to radiation. Pt. acknowledged same.

## 2022-08-30 NOTE — PROCEDURE
[Outpatient] : Outpatient [Ambulatory] : Patient is ambulatory. [THIS CHAMBER HAS BEEN CLEANED / DISINFECTED] : This chamber has been cleaned / disinfected according to local and hospital policy and procedure prior to this treatment. [Patient demonstrated and verbalized proper technique for using air break mask] : Patient demonstrated and verbalized proper technique for using air break mask [Patient educated on the risks of SMOKING prior to HBOT with understanding] : Patient educated on the risks of SMOKING prior to HBOT with understanding [Patient educated on the risks of CONSUMING ALCOHOL prior to HBOT with understanding] : Patient educated on the risks of CONSUMING ALCOHOL prior to HBOT with understanding [Number: ___] : Number: [unfilled] [Diagnosis: ___] : Diagnosis: [unfilled] [100% Cotton] : 100% cotton [Empty all pockets] : empty all pockets [No hair oils, wigs, hairpieces, pins] : no hair oils, wigs, hairpieces, pins  [Pre tx medications] : pre tx medications  [No make-up, creams] : no make-up, creams  [No jewelry] : no jewelry  [No matches, cigarettes, lighters] : no matches, cigarettes, lighters  [Hearing aid removed] : hearing aid removed [Dentures removed] : dentures removed [Ground bracelet on pt's wrist] : ground bracelet on pt's wrist  [Contacts removed] : contacts removed  [Remove nail polish] : remove nail polish  [No reading material] : no reading material  [Bra, undergarments removed] : bra, undergarments removed  [No contraindicated dressings] : no contraindicated dressings [Ground Wire - VISUAL Verification - Intact/Free of Obstruction] : Ground Wire - VISUAL Verification - Intact/Free of Obstruction  [Ground Continuity - Verified < 1 ohm w/ Wrist Strap Colton] : Ground Continuity - Verified < 1 ohm w/ Wrist Strap Colton [____] : Post-Dive: Time - [unfilled] [Clear all fields] : clear all fields [___] : Post-Dive: Value - [unfilled] mg/dL [] : No [FreeTextEntry4] : 100 MIN [FreeTextEntry6] : 5525 [FreeTextEntry8] : 1795 [de-identified] : 1480 [de-identified] : 7530 [de-identified] : 8595 [de-identified] : 9423 [de-identified] : 7694 [de-identified] : 1810 [de-identified] : 120 MIN

## 2022-08-30 NOTE — PHYSICAL EXAM
[Normal Breath Sounds] : Normal breath sounds [Normal Heart Sounds] : normal heart sounds [Alert] : alert [Oriented to Person] : oriented to person [Oriented to Place] : oriented to place [Oriented to Time] : oriented to time [Calm] : calm [de-identified] : WD WN 68 Y/O white male in NAD [de-identified] : KEVIN DUNNEM intact [FreeTextEntry1] : No open wounds- ORN

## 2022-08-30 NOTE — PROCEDURE
[Outpatient] : Outpatient [Ambulatory] : Patient is ambulatory. [THIS CHAMBER HAS BEEN CLEANED / DISINFECTED] : This chamber has been cleaned / disinfected according to local and hospital policy and procedure prior to this treatment. [____] : Post-Dive: Time - [unfilled] [___] : Post-Dive: Value - [unfilled] mg/dL [Patient demonstrated and verbalized proper technique for using air break mask] : Patient demonstrated and verbalized proper technique for using air break mask [Patient educated on the risks of SMOKING prior to HBOT with understanding] : Patient educated on the risks of SMOKING prior to HBOT with understanding [Patient educated on the risks of CONSUMING ALCOHOL prior to HBOT with understanding] : Patient educated on the risks of CONSUMING ALCOHOL prior to HBOT with understanding [100% Cotton] : 100% cotton [Empty all pockets] : empty all pockets [No hair oils, wigs, hairpieces, pins] : no hair oils, wigs, hairpieces, pins  [Pre tx medications] : pre tx medications  [No make-up, creams] : no make-up, creams  [No jewelry] : no jewelry  [No matches, cigarettes, lighters] : no matches, cigarettes, lighters  [Hearing aid removed] : hearing aid removed [Dentures removed] : dentures removed [Ground bracelet on pt's wrist] : ground bracelet on pt's wrist  [Contacts removed] : contacts removed  [Remove nail polish] : remove nail polish  [No reading material] : no reading material  [Bra, undergarments removed] : bra, undergarments removed  [No contraindicated dressings] : no contraindicated dressings [Ground Wire - VISUAL Verification - Intact/Free of Obstruction] : Ground Wire - VISUAL Verification - Intact/Free of Obstruction  [Ground Continuity - Verified < 1 ohm w/ Wrist Strap Colton] : Ground Continuity - Verified < 1 ohm w/ Wrist Strap Colton [Number: ___] : Number: [unfilled] [Diagnosis: ___] : Diagnosis: [unfilled] [Clear all fields] : clear all fields [] : No [FreeTextEntry2] : MD Rx Verified [Hard Copy] [FreeTextEntry4] : 100 [FreeTextEntry6] : 16 : 18 [FreeTextEntry8] : 16 : 28 [de-identified] : 16 : 58 [de-identified] : 17 : 03 [de-identified] : 17 : 33 [de-identified] : 17 : 38 [de-identified] : 18 : 08 [de-identified] : 18 : 18 [de-identified] : 120 min.

## 2022-08-30 NOTE — HISTORY OF PRESENT ILLNESS
[FreeTextEntry1] : Pt states he was diagnosed with Squamous Cell Carcinoma 2010 & underwent Radiation Therapy (35 txs) & has been having dental problems & has to undergo Tooth Extractions related to ORN- Pt states he was recommended by his Oral surgeon (Dr Leobardo Valenzuela [phone]) & Radiation Oncologist (Dr Chase Patrick [phone]) to undergo HBOT prior to Oral Surgery\par 8/29/22 doing well in second stage of Evaristo protocol

## 2022-08-30 NOTE — ASSESSMENT
[No change from previous assessment] : No change from previous assessment [Patient prepared for dive] : Patient prepared for dive [Patient undergoing HBO treatment for __________] : Patient undergoing HBO treatment for [unfilled] [Patient descended without problem for 9 minutes] : Patient descended without problem for 9 minutes [No dizziness or thirst] :  No dizziness or thirst [No ear problems] : No ear problems [Vital signs stable] : Vital signs stable [Tolerating dive well] : Tolerating dive well [No Chest Pain, shortness of breath] : No Chest Pain, shortness of breath [Respiratory Rate Stable] : Respiratory Rate Stable [No chest pain, shortness of breath, or ear pain] :  No chest pain, shortness of breath, or ear pain  [Tolerated Ascent well] : Tolerated Ascent well [Vital Signs stable] : Vital Signs stable [A physician was present throughout the entire HBOT] : A physician was present throughout the entire HBOT [No] : No [Clinically Stable] : Clinically stable [Continue Treatment Plan] : Continue treatment plan [Verbal] : Verbal [Patient] : Patient [Good - alert, interested, motivated] : Good - alert, interested, motivated [Verbalizes knowledge/Understanding] : Verbalizes knowledge/understanding [Signs and symptoms of infection] : sign and symptoms of infection [How and When to Call] : how and when to call [Hyperbaric Therapy] : hyperbaric therapy [Patient responsibility to plan of care] : patient responsibility to plan of care [Stable] : stable [Home] : Home [Ambulatory] : Ambulatory [] : No [FreeTextEntry4] : Dr Sutton\par Pt has completed 36/40 HBO txs (Evaristo Protocol)- to complete 40/40 HBO txs\par Pt states he has follow up to his Oral surgeon on Thursday, 09/01/22- Dr Sutton aware\par Daily HBO txs continue [FreeTextEntry2] : none

## 2022-08-30 NOTE — PHYSICAL EXAM
[Normal Breath Sounds] : Normal breath sounds [Normal Heart Sounds] : normal heart sounds [Alert] : alert [Oriented to Person] : oriented to person [Oriented to Place] : oriented to place [Oriented to Time] : oriented to time [Calm] : calm [de-identified] : WD WN 68 Y/O white male in NAD [de-identified] : KEVIN DUNNEM intact [FreeTextEntry1] : No open wounds- ORN

## 2022-08-30 NOTE — PROCEDURE
[Outpatient] : Outpatient [Ambulatory] : Patient is ambulatory. [THIS CHAMBER HAS BEEN CLEANED / DISINFECTED] : This chamber has been cleaned / disinfected according to local and hospital policy and procedure prior to this treatment. [____] : Pre-Dive: Time - [unfilled] [___] : Pre-Dive: Value - [unfilled] mg/dL [Patient demonstrated and verbalized proper technique for using air break mask] : Patient demonstrated and verbalized proper technique for using air break mask [Patient educated on the risks of SMOKING prior to HBOT with understanding] : Patient educated on the risks of SMOKING prior to HBOT with understanding [Patient educated on the risks of CONSUMING ALCOHOL prior to HBOT with understanding] : Patient educated on the risks of CONSUMING ALCOHOL prior to HBOT with understanding [100% Cotton] : 100% cotton [Empty all pockets] : empty all pockets [No hair oils, wigs, hairpieces, pins] : no hair oils, wigs, hairpieces, pins  [Pre tx medications] : pre tx medications  [No make-up, creams] : no make-up, creams  [No jewelry] : no jewelry  [No matches, cigarettes, lighters] : no matches, cigarettes, lighters  [Hearing aid removed] : hearing aid removed [Dentures removed] : dentures removed [Ground bracelet on pt's wrist] : ground bracelet on pt's wrist  [Contacts removed] : contacts removed  [Remove nail polish] : remove nail polish  [No reading material] : no reading material  [Bra, undergarments removed] : bra, undergarments removed  [No contraindicated dressings] : no contraindicated dressings [Ground Wire - VISUAL Verification - Intact/Free of Obstruction] : Ground Wire - VISUAL Verification - Intact/Free of Obstruction  [Ground Continuity - Verified < 1 ohm w/ Wrist Strap Colton] : Ground Continuity - Verified < 1 ohm w/ Wrist Strap Colton [Clear all fields] : clear all fields [Diagnosis: ___] : Diagnosis: [unfilled] [Number: ___] : Number: [unfilled] [] : No [FreeTextEntry4] : 100 [FreeTextEntry6] : 8042 [FreeTextEntry8] : 5266 [de-identified] : 3778 [de-identified] : 9552 [de-identified] : 3640 [de-identified] : 1757 [de-identified] : 1824 [de-identified] : 1836 [de-identified] : 120 MINUTES

## 2022-08-30 NOTE — PROCEDURE
[Outpatient] : Outpatient [Ambulatory] : Patient is ambulatory. [THIS CHAMBER HAS BEEN CLEANED / DISINFECTED] : This chamber has been cleaned / disinfected according to local and hospital policy and procedure prior to this treatment. [____] : Post-Dive: Time - [unfilled] [___] : Post-Dive: Value - [unfilled] mg/dL [Patient demonstrated and verbalized proper technique for using air break mask] : Patient demonstrated and verbalized proper technique for using air break mask [Patient educated on the risks of SMOKING prior to HBOT with understanding] : Patient educated on the risks of SMOKING prior to HBOT with understanding [Patient educated on the risks of CONSUMING ALCOHOL prior to HBOT with understanding] : Patient educated on the risks of CONSUMING ALCOHOL prior to HBOT with understanding [100% Cotton] : 100% cotton [Empty all pockets] : empty all pockets [No hair oils, wigs, hairpieces, pins] : no hair oils, wigs, hairpieces, pins  [Pre tx medications] : pre tx medications  [No make-up, creams] : no make-up, creams  [No jewelry] : no jewelry  [No matches, cigarettes, lighters] : no matches, cigarettes, lighters  [Hearing aid removed] : hearing aid removed [Dentures removed] : dentures removed [Ground bracelet on pt's wrist] : ground bracelet on pt's wrist  [Contacts removed] : contacts removed  [Remove nail polish] : remove nail polish  [No reading material] : no reading material  [Bra, undergarments removed] : bra, undergarments removed  [No contraindicated dressings] : no contraindicated dressings [Ground Wire - VISUAL Verification - Intact/Free of Obstruction] : Ground Wire - VISUAL Verification - Intact/Free of Obstruction  [Ground Continuity - Verified < 1 ohm w/ Wrist Strap Colton] : Ground Continuity - Verified < 1 ohm w/ Wrist Strap Colton [Number: ___] : Number: [unfilled] [Diagnosis: ___] : Diagnosis: [unfilled] [Clear all fields] : clear all fields [] : No [FreeTextEntry2] : MD Rx Verified [Hard Copy] [FreeTextEntry4] : 100 [FreeTextEntry6] : 16 : 18 [FreeTextEntry8] : 16 : 28 [de-identified] : 16 : 58 [de-identified] : 17 : 03 [de-identified] : 17 : 33 [de-identified] : 17 : 38 [de-identified] : 18 : 08 [de-identified] : 18 : 18 [de-identified] : 120 min.

## 2022-08-30 NOTE — ADDENDUM
[FreeTextEntry1] : PT ARRIVED AMBULATORY A&OX4.\par ALL VITALS WITHIN PARAMETERS FOR HBOT.\par PT DESCENT TO RX TX DEPTH IN CHAMBER 4 WAS WITHOUT INCIDENT.\par PT RESTING AT DEPTH, CHEST RISE AND FALL OBSERVED.\par TRANSFER OF CARE TO .\par PT DESCENT TO RX TX DEPTH IN CHAMBER 4 WAS WITHOUT INCIDENT.\par PT WAS RESTING AT DEPTH, CHEST RISE AND FALL OBSERVED.\par PT TOLERATED AIR BREAKS WELL.\par PT ASCENT WAS WITHOUT INCIDENT.\par PT TOLERATED HBOT WELL.\par \par CHABDELRAHMAN GONZALEZ 08/24/22\par

## 2022-08-30 NOTE — ADDENDUM
[FreeTextEntry1] : PT ARRIVED A&OX3 \par ALL VITALS WITHIN PARAMETERS FOR HBOT \par PT PRE-DIVE CHECKLIST SIGNED AND WITNESSED BY LPN\par PT DESCENDED TO 2.4 PEACE @ 2.2 PSI/MIN IN CHAMBER #2 WITHOUT INCIDENT\par PT RESTING AT TX DEPTH WITH VISIBLE CHEST RISE AND FALL OBSERVED CHAMBER SIDE\par PT TOLERATED AIR BREAKS WELL\par PT ASCENDED FROM 2.4 PEACE @ 2.2 PSI/MIN WITHOUT INCIDENT\par PT TOLERATED TX WELL\par

## 2022-08-30 NOTE — PROCEDURE
[Outpatient] : Outpatient [Ambulatory] : Patient is ambulatory. [THIS CHAMBER HAS BEEN CLEANED / DISINFECTED] : This chamber has been cleaned / disinfected according to local and hospital policy and procedure prior to this treatment. [Patient demonstrated and verbalized proper technique for using air break mask] : Patient demonstrated and verbalized proper technique for using air break mask [Patient educated on the risks of SMOKING prior to HBOT with understanding] : Patient educated on the risks of SMOKING prior to HBOT with understanding [Patient educated on the risks of CONSUMING ALCOHOL prior to HBOT with understanding] : Patient educated on the risks of CONSUMING ALCOHOL prior to HBOT with understanding [100% Cotton] : 100% cotton [Empty all pockets] : empty all pockets [No hair oils, wigs, hairpieces, pins] : no hair oils, wigs, hairpieces, pins  [Pre tx medications] : pre tx medications  [No make-up, creams] : no make-up, creams  [No jewelry] : no jewelry  [No matches, cigarettes, lighters] : no matches, cigarettes, lighters  [Hearing aid removed] : hearing aid removed [Dentures removed] : dentures removed [Ground bracelet on pt's wrist] : ground bracelet on pt's wrist  [Contacts removed] : contacts removed  [Remove nail polish] : remove nail polish  [No reading material] : no reading material  [Bra, undergarments removed] : bra, undergarments removed  [No contraindicated dressings] : no contraindicated dressings [Ground Wire - VISUAL Verification - Intact/Free of Obstruction] : Ground Wire - VISUAL Verification - Intact/Free of Obstruction  [Ground Continuity - Verified < 1 ohm w/ Wrist Strap Colton] : Ground Continuity - Verified < 1 ohm w/ Wrist Strap Colton [Diagnosis: ___] : Diagnosis: [unfilled] [Number: ___] : Number: [unfilled] [____] : Post-Dive: Time - [unfilled] [___] : Post-Dive: Value - [unfilled] mg/dL [Clear all fields] : clear all fields [] : No [FreeTextEntry4] : 100 minutes [FreeTextEntry6] : 1002 [FreeTextEntry8] : 1013 [de-identified] : 1047 [de-identified] : 1042 [de-identified] : 1114 [de-identified] : 7062 [de-identified] : 4244 [de-identified] : 6988 [de-identified] : 120 minutes

## 2022-08-30 NOTE — ADDENDUM
[FreeTextEntry1] : The pt. presented to [facility] ambulatory and A&Ox3 for scheduled HBOT.\par \par The pt. was offered and declined positional/off-loading measures prior to start of HBOT.\par The pt. will be seen x MD and Nurse post-HBOT for vbl. assessment.\par \par The pt's pre-dive screening was found to be within acceptable limits to begin HBOT.\par "HBO-TO" secondary PDS performed x CHT found the pt. fit to begin HBOTx2.\par The pt. reports removing his dentures and locking same in HBO locker prior to entering HBO suite. \par The  pt. descended @ 2.2 PSI/min. to Rx'd HBOT tx. depth of 2.4 PEACE in chamber # 4 without incident.\par The pt. was observed with visible chest motion and without incident for the duration of HBOT.\par The pt. was administered intermittent med. air over course of HBOT without incident.\par The pt. ascended from tx. depth to surface without incident.\par The pt. tolerated HBOT without incident.\par The pt. received MD assessment post-HBOT.\par The pt. exited HBO suite ambulatory and independently. \par Pt reports F/U scheduled with his oral sx. this Thurs., 09/01/22 ; same was reported to MD and MARYANN.\par Pt. questioned need for additional HBOT for future oral sx. Pt. was advised that performing surgeon will make that determination based on pt's clinical presentation and exposure of various portions of mouth to radiation. Pt. acknowledged same.

## 2022-08-30 NOTE — ADDENDUM
[FreeTextEntry1] : Patients Order Verified Prior to Treatment. \par Patients Secondary Contraindication Examination Check Preformed by CHT. \par Patients Contraindication List and Pre-Check List, Verified and Signed by Technician and CHT Prior to Patients Treatment. \par \par Pt descended to 2.4 PEACE @ 2.2 PSI/min without incident in chamber #1\par Pt resting @ depth with chest rise and fall observed throughout tx. \par Pt tolerated air breaks well. \par Pt ascended from 2.4 PEACE @ 2.2 PSI/min without incident. \par Pt tolerated tx well.\par  Patient descended to 2.4 PEACE @ 2.2 PSI/MIN without incident in chamber #1.\par Patient resting comfortably @ depth, equal chest rise observed throughout treatment.\par Patient tolerated both air breaks well.\par Patient ascended from 2.4 PEACE @ 2.2 PSI/MIN without incident in chamber #1.\par Patient tolerated treatment well.\par Patient Exited the Hyperbaric Suite Safely\par \par \par

## 2022-08-31 ENCOUNTER — APPOINTMENT (OUTPATIENT)
Dept: HYPERBARIC MEDICINE | Facility: HOSPITAL | Age: 67
End: 2022-08-31

## 2022-08-31 ENCOUNTER — OUTPATIENT (OUTPATIENT)
Dept: OUTPATIENT SERVICES | Facility: HOSPITAL | Age: 67
LOS: 1 days | Discharge: HOME | End: 2022-08-31
Payer: MEDICARE

## 2022-08-31 VITALS
OXYGEN SATURATION: 95 % | DIASTOLIC BLOOD PRESSURE: 76 MMHG | SYSTOLIC BLOOD PRESSURE: 113 MMHG | RESPIRATION RATE: 18 BRPM | TEMPERATURE: 98.6 F | HEART RATE: 78 BPM

## 2022-08-31 VITALS
DIASTOLIC BLOOD PRESSURE: 84 MMHG | OXYGEN SATURATION: 100 % | TEMPERATURE: 98.8 F | RESPIRATION RATE: 18 BRPM | HEART RATE: 97 BPM | SYSTOLIC BLOOD PRESSURE: 126 MMHG

## 2022-08-31 DIAGNOSIS — Z85.810 PERSONAL HISTORY OF MALIGNANT NEOPLASM OF TONGUE: ICD-10-CM

## 2022-08-31 DIAGNOSIS — Z98.52 VASECTOMY STATUS: Chronic | ICD-10-CM

## 2022-08-31 DIAGNOSIS — M27.2 INFLAMMATORY CONDITIONS OF JAWS: ICD-10-CM

## 2022-08-31 PROCEDURE — 82962 GLUCOSE BLOOD TEST: CPT

## 2022-08-31 PROCEDURE — G0277: CPT

## 2022-08-31 PROCEDURE — 99183 HYPERBARIC OXYGEN THERAPY: CPT

## 2022-08-31 NOTE — ADDENDUM
[FreeTextEntry1] : PT ARRIVED A&OX3 \par ALL VITALS WITHIN PARAMETERS FOR HBOT\par PT PRE-DIVE CHECKLIST SIGNED AND WITNESSED BY CHRN\par PT DESCENDED TO 2.4 PEACE @ 2.2 PSI/MIN IN CHAMBER #1  WITHOUT INCIDENT\par PT RESTING AT TX DEPTH WITH VISIBLE CHEST RISE AND FALL OBSERVED CHAMBER SIDE.\par \par

## 2022-08-31 NOTE — PROCEDURE
[Outpatient] : Outpatient [Ambulatory] : Patient is ambulatory. [THIS CHAMBER HAS BEEN CLEANED / DISINFECTED] : This chamber has been cleaned / disinfected according to local and hospital policy and procedure prior to this treatment. [Patient demonstrated and verbalized proper technique for using air break mask] : Patient demonstrated and verbalized proper technique for using air break mask [Patient educated on the risks of SMOKING prior to HBOT with understanding] : Patient educated on the risks of SMOKING prior to HBOT with understanding [Patient educated on the risks of CONSUMING ALCOHOL prior to HBOT with understanding] : Patient educated on the risks of CONSUMING ALCOHOL prior to HBOT with understanding [100% Cotton] : 100% cotton [Empty all pockets] : empty all pockets [No hair oils, wigs, hairpieces, pins] : no hair oils, wigs, hairpieces, pins  [Pre tx medications] : pre tx medications  [No make-up, creams] : no make-up, creams  [No jewelry] : no jewelry  [No matches, cigarettes, lighters] : no matches, cigarettes, lighters  [Hearing aid removed] : hearing aid removed [Dentures removed] : dentures removed [Ground bracelet on pt's wrist] : ground bracelet on pt's wrist  [Contacts removed] : contacts removed  [Remove nail polish] : remove nail polish  [No reading material] : no reading material  [Bra, undergarments removed] : bra, undergarments removed  [No contraindicated dressings] : no contraindicated dressings [Ground Wire - VISUAL Verification - Intact/Free of Obstruction] : Ground Wire - VISUAL Verification - Intact/Free of Obstruction  [Ground Continuity - Verified < 1 ohm w/ Wrist Strap Colton] : Ground Continuity - Verified < 1 ohm w/ Wrist Strap Colton [Number: ___] : Number: [unfilled] [Diagnosis: ___] : Diagnosis: [unfilled] [____] : Post-Dive: Time - [unfilled] [___] : Post-Dive: Value - [unfilled] mg/dL [Clear all fields] : clear all fields [] : No [FreeTextEntry2] : 2.4 [FreeTextEntry4] : 100 mINS [FreeTextEntry6] : 2381 [FreeTextEntry8] : 6797 [de-identified] : 6573 [de-identified] : 8131 [de-identified] : 4354 [de-identified] : 1814 [de-identified] : 1758 [de-identified] : 1754 [de-identified] : 120  MINS

## 2022-09-01 ENCOUNTER — OUTPATIENT (OUTPATIENT)
Dept: OUTPATIENT SERVICES | Facility: HOSPITAL | Age: 67
LOS: 1 days | Discharge: HOME | End: 2022-09-01
Payer: MEDICARE

## 2022-09-01 ENCOUNTER — APPOINTMENT (OUTPATIENT)
Dept: HYPERBARIC MEDICINE | Facility: HOSPITAL | Age: 67
End: 2022-09-01

## 2022-09-01 VITALS
SYSTOLIC BLOOD PRESSURE: 127 MMHG | OXYGEN SATURATION: 99 % | TEMPERATURE: 98.1 F | HEART RATE: 67 BPM | RESPIRATION RATE: 18 BRPM | DIASTOLIC BLOOD PRESSURE: 87 MMHG

## 2022-09-01 VITALS
DIASTOLIC BLOOD PRESSURE: 71 MMHG | OXYGEN SATURATION: 100 % | HEART RATE: 65 BPM | TEMPERATURE: 98.3 F | RESPIRATION RATE: 18 BRPM | SYSTOLIC BLOOD PRESSURE: 107 MMHG

## 2022-09-01 DIAGNOSIS — Z85.810 PERSONAL HISTORY OF MALIGNANT NEOPLASM OF TONGUE: ICD-10-CM

## 2022-09-01 DIAGNOSIS — Z98.52 VASECTOMY STATUS: Chronic | ICD-10-CM

## 2022-09-01 DIAGNOSIS — M27.2 INFLAMMATORY CONDITIONS OF JAWS: ICD-10-CM

## 2022-09-01 PROCEDURE — G0277: CPT

## 2022-09-01 PROCEDURE — 82962 GLUCOSE BLOOD TEST: CPT

## 2022-09-01 PROCEDURE — 99183 HYPERBARIC OXYGEN THERAPY: CPT

## 2022-09-01 NOTE — PROCEDURE
[Outpatient] : Outpatient [Ambulatory] : Patient is ambulatory. [THIS CHAMBER HAS BEEN CLEANED / DISINFECTED] : This chamber has been cleaned / disinfected according to local and hospital policy and procedure prior to this treatment. [____] : Pre-Dive: Time - [unfilled] [___] : Pre-Dive: Value - [unfilled] mg/dL [Patient demonstrated and verbalized proper technique for using air break mask] : Patient demonstrated and verbalized proper technique for using air break mask [Patient educated on the risks of SMOKING prior to HBOT with understanding] : Patient educated on the risks of SMOKING prior to HBOT with understanding [Patient educated on the risks of CONSUMING ALCOHOL prior to HBOT with understanding] : Patient educated on the risks of CONSUMING ALCOHOL prior to HBOT with understanding [100% Cotton] : 100% cotton [Empty all pockets] : empty all pockets [No hair oils, wigs, hairpieces, pins] : no hair oils, wigs, hairpieces, pins  [Pre tx medications] : pre tx medications  [No make-up, creams] : no make-up, creams  [No jewelry] : no jewelry  [No matches, cigarettes, lighters] : no matches, cigarettes, lighters  [Hearing aid removed] : hearing aid removed [Dentures removed] : dentures removed [Ground bracelet on pt's wrist] : ground bracelet on pt's wrist  [Contacts removed] : contacts removed  [Remove nail polish] : remove nail polish  [No reading material] : no reading material  [Bra, undergarments removed] : bra, undergarments removed  [No contraindicated dressings] : no contraindicated dressings [Ground Wire - VISUAL Verification - Intact/Free of Obstruction] : Ground Wire - VISUAL Verification - Intact/Free of Obstruction  [Ground Continuity - Verified < 1 ohm w/ Wrist Strap Colton] : Ground Continuity - Verified < 1 ohm w/ Wrist Strap Colton [Number: ___] : Number: [unfilled] [Diagnosis: ___] : Diagnosis: [unfilled] [Clear all fields] : clear all fields [] : No [FreeTextEntry4] : 100 minutes [FreeTextEntry6] : 1416 [FreeTextEntry8] : 1492 [de-identified] : 3800 [de-identified] : 5359 [de-identified] : 4347 [de-identified] : 3865 [de-identified] : 9054 [de-identified] : 1814 [de-identified] : 120 minutes

## 2022-09-01 NOTE — ADDENDUM
[FreeTextEntry1] : Pt descended to tx depth 2.4 nhung @2.2 psi/min without incident in chamber #3\par Pt resting at tx depth with visible chest rise and fall as observed chamber side  \par 8 minutes into pt descent a ring was noticed on Pt hand \par CHT  notified and advised pt to remove ring and place on himself\par pt complied and continued HBOT \par pt tolerated both intermediate air breaks well\par pt ascended from tx depth without incident in chamber #3 \par pt tolerated tx well\par

## 2022-09-02 ENCOUNTER — OUTPATIENT (OUTPATIENT)
Dept: OUTPATIENT SERVICES | Facility: HOSPITAL | Age: 67
LOS: 1 days | Discharge: HOME | End: 2022-09-02
Payer: MEDICARE

## 2022-09-02 ENCOUNTER — APPOINTMENT (OUTPATIENT)
Dept: HYPERBARIC MEDICINE | Facility: HOSPITAL | Age: 67
End: 2022-09-02

## 2022-09-02 VITALS
SYSTOLIC BLOOD PRESSURE: 134 MMHG | HEART RATE: 68 BPM | DIASTOLIC BLOOD PRESSURE: 79 MMHG | OXYGEN SATURATION: 100 % | TEMPERATURE: 97.8 F | RESPIRATION RATE: 18 BRPM

## 2022-09-02 VITALS
DIASTOLIC BLOOD PRESSURE: 79 MMHG | HEART RATE: 59 BPM | SYSTOLIC BLOOD PRESSURE: 118 MMHG | OXYGEN SATURATION: 100 % | TEMPERATURE: 97.6 F | RESPIRATION RATE: 18 BRPM

## 2022-09-02 DIAGNOSIS — Z98.52 VASECTOMY STATUS: Chronic | ICD-10-CM

## 2022-09-02 DIAGNOSIS — M27.2 INFLAMMATORY CONDITIONS OF JAWS: ICD-10-CM

## 2022-09-02 PROCEDURE — 82962 GLUCOSE BLOOD TEST: CPT

## 2022-09-02 PROCEDURE — 99183 HYPERBARIC OXYGEN THERAPY: CPT

## 2022-09-02 PROCEDURE — G0277: CPT

## 2022-09-05 DIAGNOSIS — M27.2 INFLAMMATORY CONDITIONS OF JAWS: ICD-10-CM

## 2022-09-05 DIAGNOSIS — Z85.810 PERSONAL HISTORY OF MALIGNANT NEOPLASM OF TONGUE: ICD-10-CM

## 2022-09-06 ENCOUNTER — APPOINTMENT (OUTPATIENT)
Dept: HYPERBARIC MEDICINE | Facility: HOSPITAL | Age: 67
End: 2022-09-06

## 2022-09-06 NOTE — PROCEDURE
[Outpatient] : Outpatient [Ambulatory] : Patient is ambulatory. [THIS CHAMBER HAS BEEN CLEANED / DISINFECTED] : This chamber has been cleaned / disinfected according to local and hospital policy and procedure prior to this treatment. [____] : Post-Dive: Time - [unfilled] [___] : Post-Dive: Value - [unfilled] mg/dL [Patient demonstrated and verbalized proper technique for using air break mask] : Patient demonstrated and verbalized proper technique for using air break mask [Patient educated on the risks of SMOKING prior to HBOT with understanding] : Patient educated on the risks of SMOKING prior to HBOT with understanding [Patient educated on the risks of CONSUMING ALCOHOL prior to HBOT with understanding] : Patient educated on the risks of CONSUMING ALCOHOL prior to HBOT with understanding [100% Cotton] : 100% cotton [Empty all pockets] : empty all pockets [No hair oils, wigs, hairpieces, pins] : no hair oils, wigs, hairpieces, pins  [Pre tx medications] : pre tx medications  [No make-up, creams] : no make-up, creams  [No jewelry] : no jewelry  [No matches, cigarettes, lighters] : no matches, cigarettes, lighters  [Hearing aid removed] : hearing aid removed [Dentures removed] : dentures removed [Ground bracelet on pt's wrist] : ground bracelet on pt's wrist  [Contacts removed] : contacts removed  [Remove nail polish] : remove nail polish  [No reading material] : no reading material  [Bra, undergarments removed] : bra, undergarments removed  [No contraindicated dressings] : no contraindicated dressings [Ground Wire - VISUAL Verification - Intact/Free of Obstruction] : Ground Wire - VISUAL Verification - Intact/Free of Obstruction  [Ground Continuity - Verified < 1 ohm w/ Wrist Strap Colton] : Ground Continuity - Verified < 1 ohm w/ Wrist Strap Colton [Clear all fields] : clear all fields [Number: ___] : Number: [unfilled] [Diagnosis: ___] : Diagnosis: [unfilled] [] : No [FreeTextEntry4] : 100 [FreeTextEntry6] : 4637 [FreeTextEntry8] : 0979 [de-identified] : 1522 [de-identified] : 9022 [de-identified] : 1600 [de-identified] : 9436 [de-identified] : 0616 [de-identified] : 2198 [de-identified] : 120 MINUTES

## 2022-09-06 NOTE — PROCEDURE
[Outpatient] : Outpatient [Ambulatory] : Patient is ambulatory. [THIS CHAMBER HAS BEEN CLEANED / DISINFECTED] : This chamber has been cleaned / disinfected according to local and hospital policy and procedure prior to this treatment. [Patient demonstrated and verbalized proper technique for using air break mask] : Patient demonstrated and verbalized proper technique for using air break mask [Patient educated on the risks of SMOKING prior to HBOT with understanding] : Patient educated on the risks of SMOKING prior to HBOT with understanding [Patient educated on the risks of CONSUMING ALCOHOL prior to HBOT with understanding] : Patient educated on the risks of CONSUMING ALCOHOL prior to HBOT with understanding [100% Cotton] : 100% cotton [Empty all pockets] : empty all pockets [No hair oils, wigs, hairpieces, pins] : no hair oils, wigs, hairpieces, pins  [Pre tx medications] : pre tx medications  [No make-up, creams] : no make-up, creams  [No jewelry] : no jewelry  [No matches, cigarettes, lighters] : no matches, cigarettes, lighters  [Hearing aid removed] : hearing aid removed [Dentures removed] : dentures removed [Ground bracelet on pt's wrist] : ground bracelet on pt's wrist  [Contacts removed] : contacts removed  [Remove nail polish] : remove nail polish  [No reading material] : no reading material  [Bra, undergarments removed] : bra, undergarments removed  [No contraindicated dressings] : no contraindicated dressings [Ground Wire - VISUAL Verification - Intact/Free of Obstruction] : Ground Wire - VISUAL Verification - Intact/Free of Obstruction  [Ground Continuity - Verified < 1 ohm w/ Wrist Strap Colton] : Ground Continuity - Verified < 1 ohm w/ Wrist Strap Colton [Number: ___] : Number: [unfilled] [Diagnosis: ___] : Diagnosis: [unfilled] [____] : Post-Dive: Time - [unfilled] [___] : Post-Dive: Value - [unfilled] mg/dL [Clear all fields] : clear all fields [] : No [FreeTextEntry4] : 100 minutes [FreeTextEntry6] : 2975 [FreeTextEntry8] : 9043 [de-identified] : 5705 [de-identified] : 3365 [de-identified] : 8329 [de-identified] : 5030 [de-identified] : 9264 [de-identified] : 4023 [de-identified] : 120 minutes

## 2022-09-06 NOTE — ADDENDUM
[FreeTextEntry1] : PT ARRIVED A&OX3 \par ALL VITALS WITHIN PARAMETERS FOR HBOT \par PT PRE-DIVE CHECKLIST SIGNED AND WITNESSED BY CHT\par PT DESCENDED TO 2.4 PEACE @ 2.2 PSI/MIN IN CHAMBER #3 WITHOUT INCIDENT\par PT RESTING AT TX DEPTH WITH VISIBLE CHEST RISE AND FALL OBSERVED CHAMBER SIDE\par PT TOLERATED AIR BREAKS WELL\par PT ASCENDED FROM 2.4 PEACE @ 2.2 PSI/MIN WITHOUT INCIDENT\par PT TOLERATED TX WELL\par

## 2022-09-06 NOTE — ADDENDUM
[FreeTextEntry1] : Pt descended to tx depth 2.4 nhung @2.2 psi/min without incident in chamber #1 \par pt's resting at tx depth with visible chest rise and fall as observed chamber side \par PT TOLERATED AIR BREAKS WELL\par PT ASCENDED FROM 2.4 NHUNG @ 2.2 PSI/MIN WITHOUT INCIDENT\par PT TOLERATED TX WELL\par

## 2022-09-07 ENCOUNTER — APPOINTMENT (OUTPATIENT)
Dept: HYPERBARIC MEDICINE | Facility: HOSPITAL | Age: 67
End: 2022-09-07

## 2022-09-08 ENCOUNTER — APPOINTMENT (OUTPATIENT)
Dept: HYPERBARIC MEDICINE | Facility: HOSPITAL | Age: 67
End: 2022-09-08

## 2022-09-09 ENCOUNTER — APPOINTMENT (OUTPATIENT)
Dept: HYPERBARIC MEDICINE | Facility: HOSPITAL | Age: 67
End: 2022-09-09

## 2023-01-27 ENCOUNTER — OUTPATIENT (OUTPATIENT)
Dept: OUTPATIENT SERVICES | Facility: HOSPITAL | Age: 68
LOS: 1 days | End: 2023-01-27
Payer: MEDICARE

## 2023-01-27 VITALS
TEMPERATURE: 98 F | DIASTOLIC BLOOD PRESSURE: 80 MMHG | HEIGHT: 67 IN | OXYGEN SATURATION: 99 % | HEART RATE: 70 BPM | SYSTOLIC BLOOD PRESSURE: 120 MMHG | WEIGHT: 156.09 LBS | RESPIRATION RATE: 14 BRPM

## 2023-01-27 DIAGNOSIS — K40.90 UNILATERAL INGUINAL HERNIA, WITHOUT OBSTRUCTION OR GANGRENE, NOT SPECIFIED AS RECURRENT: ICD-10-CM

## 2023-01-27 DIAGNOSIS — Z98.52 VASECTOMY STATUS: Chronic | ICD-10-CM

## 2023-01-27 DIAGNOSIS — Z01.818 ENCOUNTER FOR OTHER PREPROCEDURAL EXAMINATION: ICD-10-CM

## 2023-01-27 DIAGNOSIS — Z98.890 OTHER SPECIFIED POSTPROCEDURAL STATES: Chronic | ICD-10-CM

## 2023-01-27 DIAGNOSIS — Z92.89 PERSONAL HISTORY OF OTHER MEDICAL TREATMENT: Chronic | ICD-10-CM

## 2023-01-27 LAB
ANION GAP SERPL CALC-SCNC: 5 MMOL/L — SIGNIFICANT CHANGE UP (ref 5–17)
BUN SERPL-MCNC: 12 MG/DL — SIGNIFICANT CHANGE UP (ref 7–23)
CALCIUM SERPL-MCNC: 9.5 MG/DL — SIGNIFICANT CHANGE UP (ref 8.4–10.5)
CHLORIDE SERPL-SCNC: 106 MMOL/L — SIGNIFICANT CHANGE UP (ref 96–108)
CO2 SERPL-SCNC: 32 MMOL/L — HIGH (ref 22–31)
CREAT SERPL-MCNC: 0.9 MG/DL — SIGNIFICANT CHANGE UP (ref 0.5–1.3)
EGFR: 93 ML/MIN/1.73M2 — SIGNIFICANT CHANGE UP
GLUCOSE SERPL-MCNC: 81 MG/DL — SIGNIFICANT CHANGE UP (ref 70–99)
HCT VFR BLD CALC: 44.3 % — SIGNIFICANT CHANGE UP (ref 39–50)
HGB BLD-MCNC: 15 G/DL — SIGNIFICANT CHANGE UP (ref 13–17)
MCHC RBC-ENTMCNC: 31.1 PG — SIGNIFICANT CHANGE UP (ref 27–34)
MCHC RBC-ENTMCNC: 33.9 GM/DL — SIGNIFICANT CHANGE UP (ref 32–36)
MCV RBC AUTO: 91.9 FL — SIGNIFICANT CHANGE UP (ref 80–100)
NRBC # BLD: 0 /100 WBCS — SIGNIFICANT CHANGE UP (ref 0–0)
PLATELET # BLD AUTO: 248 K/UL — SIGNIFICANT CHANGE UP (ref 150–400)
POTASSIUM SERPL-MCNC: 3.9 MMOL/L — SIGNIFICANT CHANGE UP (ref 3.5–5.3)
POTASSIUM SERPL-SCNC: 3.9 MMOL/L — SIGNIFICANT CHANGE UP (ref 3.5–5.3)
RBC # BLD: 4.82 M/UL — SIGNIFICANT CHANGE UP (ref 4.2–5.8)
RBC # FLD: 12.3 % — SIGNIFICANT CHANGE UP (ref 10.3–14.5)
SODIUM SERPL-SCNC: 143 MMOL/L — SIGNIFICANT CHANGE UP (ref 135–145)
WBC # BLD: 5.91 K/UL — SIGNIFICANT CHANGE UP (ref 3.8–10.5)
WBC # FLD AUTO: 5.91 K/UL — SIGNIFICANT CHANGE UP (ref 3.8–10.5)

## 2023-01-27 PROCEDURE — 80048 BASIC METABOLIC PNL TOTAL CA: CPT

## 2023-01-27 PROCEDURE — 36415 COLL VENOUS BLD VENIPUNCTURE: CPT

## 2023-01-27 PROCEDURE — 93010 ELECTROCARDIOGRAM REPORT: CPT

## 2023-01-27 PROCEDURE — 93005 ELECTROCARDIOGRAM TRACING: CPT

## 2023-01-27 PROCEDURE — 85027 COMPLETE CBC AUTOMATED: CPT

## 2023-01-27 PROCEDURE — G0463: CPT

## 2023-01-27 NOTE — H&P PST ADULT - NSICDXPASTSURGICALHX_GEN_ALL_CORE_FT
PAST SURGICAL HISTORY:  History of dental surgery     History of left-sided carotid endarterectomy     S/P right inguinal hernia repair     S/P Tonsillectomy     S/P vasectomy

## 2023-01-27 NOTE — H&P PST ADULT - NSICDXPROCEDURE_GEN_ALL_CORE_FT
PROCEDURES:  Repair, femoral hernia, left 27-Jan-2023 09:40:11 left inguinal hernia repair Petra Hope

## 2023-01-27 NOTE — H&P PST ADULT - PROBLEM SELECTOR PLAN 1
scheduled for left inguinal hernia repair on 2/16.23   will obtain medical clearance   Pre op instructions on wash, medications hold pentoxifylline 1 week before surgery   Follow up with PMD on pentoxifylline medication instruction for upcoming surgery. scheduled for left inguinal hernia repair on 2/16./23   will obtain medical clearance   Pre op instructions on wash, medications hold pentoxifylline 1 week before surgery   Follow up with PMD on pentoxifylline medication instruction for upcoming surgery.  COVID test on 2/14/23 at 8am   PCP needs to address EKG in the clearance

## 2023-01-27 NOTE — H&P PST ADULT - GENITOURINARY COMMENTS
left inguinal : large hernia ,  tender on palpation , reducible left inguinal : + hernia ,  tender on palpation , reducible

## 2023-01-27 NOTE — H&P PST ADULT - HISTORY OF PRESENT ILLNESS
this is a 69 y/o male who presents with complaint of enlarging symptomatic left inguinal hernia .He  had a coughing fit 2 months ago, he felt a bulge in left  groin; diagnosed with hernia; Reports left groin pain . scheduled for left inguinal hernia repair on 2/16/23

## 2023-01-27 NOTE — H&P PST ADULT - NSICDXPASTMEDICALHX_GEN_ALL_CORE_FT
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

## 2023-01-27 NOTE — H&P PST ADULT - ASSESSMENT
69 y/o male with enlarging left inguinal hernia  67 y/o male with enlarging symptomatic  left inguinal hernia

## 2023-02-06 RX ORDER — CHLORHEXIDINE GLUCONATE 213 G/1000ML
1 SOLUTION TOPICAL DAILY
Refills: 0 | Status: DISCONTINUED | OUTPATIENT
Start: 2023-02-16 | End: 2023-03-02

## 2023-02-07 PROBLEM — K40.90 UNILATERAL INGUINAL HERNIA, WITHOUT OBSTRUCTION OR GANGRENE, NOT SPECIFIED AS RECURRENT: Chronic | Status: ACTIVE | Noted: 2023-01-27

## 2023-02-08 ENCOUNTER — APPOINTMENT (OUTPATIENT)
Dept: WOUND CARE | Facility: HOSPITAL | Age: 68
End: 2023-02-08
Payer: MEDICARE

## 2023-02-08 ENCOUNTER — OUTPATIENT (OUTPATIENT)
Dept: OUTPATIENT SERVICES | Facility: HOSPITAL | Age: 68
LOS: 1 days | Discharge: HOME | End: 2023-02-08
Payer: MEDICARE

## 2023-02-08 VITALS
BODY MASS INDEX: 24.43 KG/M2 | WEIGHT: 152 LBS | HEART RATE: 67 BPM | HEIGHT: 66 IN | TEMPERATURE: 98.7 F | RESPIRATION RATE: 20 BRPM | OXYGEN SATURATION: 97 % | SYSTOLIC BLOOD PRESSURE: 112 MMHG | DIASTOLIC BLOOD PRESSURE: 63 MMHG

## 2023-02-08 DIAGNOSIS — Z92.89 PERSONAL HISTORY OF OTHER MEDICAL TREATMENT: ICD-10-CM

## 2023-02-08 DIAGNOSIS — Z98.52 VASECTOMY STATUS: Chronic | ICD-10-CM

## 2023-02-08 DIAGNOSIS — Z92.89 PERSONAL HISTORY OF OTHER MEDICAL TREATMENT: Chronic | ICD-10-CM

## 2023-02-08 DIAGNOSIS — Z98.890 OTHER SPECIFIED POSTPROCEDURAL STATES: Chronic | ICD-10-CM

## 2023-02-08 DIAGNOSIS — M27.2 INFLAMMATORY CONDITIONS OF JAWS: ICD-10-CM

## 2023-02-08 PROCEDURE — 71046 X-RAY EXAM CHEST 2 VIEWS: CPT

## 2023-02-08 PROCEDURE — G0463: CPT

## 2023-02-08 PROCEDURE — 71046 X-RAY EXAM CHEST 2 VIEWS: CPT | Mod: 26

## 2023-02-08 PROCEDURE — 99213 OFFICE O/P EST LOW 20 MIN: CPT

## 2023-02-08 RX ORDER — ASPIRIN ENTERIC COATED TABLETS 81 MG 81 MG/1
81 TABLET, DELAYED RELEASE ORAL DAILY
Refills: 0 | Status: COMPLETED | COMMUNITY
End: 2023-02-08

## 2023-02-08 RX ORDER — ROSUVASTATIN CALCIUM 40 MG/1
40 TABLET, FILM COATED ORAL
Refills: 0 | Status: ACTIVE | COMMUNITY

## 2023-02-08 NOTE — HISTORY OF PRESENT ILLNESS
[FreeTextEntry1] : 69 yo WM, last seen in the [facility] in 9/22 where he had been receiving HBO tx for ORN. Pt seen by his oral surgeon recently and is now planning surgery to place his permanent teeth on March 28, 2023. Pt 's oral surgeon is planning to have pt undergo 10 HBO tx just prior to the surgery followed by 5 additional HBO tx immediately after the surgery.\par \par \par \par \par \par \par RN note\par LENA ALFARO is being seen for a initial nursing assessment visit. Pt presents to the wound clinic for HBO workup and resumption (Previously completed 40/40 HBOT)\par Pt referred by oral surgeon (Leobardo Greenberg DDS) who recommends Pt receives (10) HBOT, have a surgical intervention, then have an additional (5) HBOT. Patient accompanied by Self. \par \par \par

## 2023-02-08 NOTE — PLAN
[FreeTextEntry1] : CXR\par get lab work done by his PCP yesterday\par get authorization for HBO to begin on 3/13/23\par \par \par  \par \par time spent-  25  mins.

## 2023-02-08 NOTE — ASSESSMENT
[Verbal] : Verbal [Written] : Written [Demo] : Demo [Patient] : Patient [Good - alert, interested, motivated] : Good - alert, interested, motivated [Demonstrates independently] : demonstrates independently [Skin Care] : skin care [Signs and symptoms of infection] : sign and symptoms of infection [Nutrition] : nutrition [How and When to Call] : how and when to call [Hyperbaric Therapy] : hyperbaric therapy [Patient responsibility to plan of care] : patient responsibility to plan of care [Stable] : stable [Other: ____] : [unfilled] [Wheelchair] : Wheelchair [Not Applicable - Long Term Care/Home Health Agency] : Long Term Care/Home Health Agency: Not Applicable [] : No [FreeTextEntry2] : Infection Prevention\par HBOT\par Oral surgery f/u [FreeTextEntry3] : Initial visit [FreeTextEntry4] : Pt wishes to start HBOT March 13 2023\par Surgery; March 28 2023\par \par \par Pt processed for HBOT. \par Completed: \par HBOT Consent\par Pre-procedure checklist. \par HBOT Auth (ORN)\par 2.4 PEACE Order\par 2 V CXR\par TM Eval\par \par To be completed\par Chamber time / start date\par COVID 19 PCR swab\par \par Discussed sequence of treatment procedures and what to expect, e.g. pressure, temperature, noises, and wound care. \par Pt understanding of instructions and any return demonstrations. Provided patient with written education on hyperbaric oxygen therapy.\par Pt to bring outside lab work from recent physical (2/7/23)\par \par Pt discharged to Radiology dept. for completion of 2V CXR\par F/u 03/2023

## 2023-02-08 NOTE — PHYSICAL EXAM
[Normal Thyroid] : the thyroid was normal [Normal Breath Sounds] : Normal breath sounds [Normal Heart Sounds] : normal heart sounds [Normal Rate and Rhythm] : normal rate and rhythm [Calm] : calm [Alert] : alert [Oriented to Person] : oriented to person [Oriented to Place] : oriented to place [Oriented to Time] : oriented to time [JVD] : no jugular venous distention  [Abdomen Masses] : No abdominal massess [Abdomen Tenderness] : ~T ~M No abdominal tenderness [Tender] : nontender [Enlarged] : not enlarged [de-identified] : adult WM, NAD, alert, Ox3. [de-identified] : otoscope exam-bilateral intact eardrums; no fluid collection or erythema. 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

## 2023-02-10 DIAGNOSIS — Z80.1 FAMILY HISTORY OF MALIGNANT NEOPLASM OF TRACHEA, BRONCHUS AND LUNG: ICD-10-CM

## 2023-02-10 DIAGNOSIS — Z79.899 OTHER LONG TERM (CURRENT) DRUG THERAPY: ICD-10-CM

## 2023-02-10 DIAGNOSIS — Z86.16 PERSONAL HISTORY OF COVID-19: ICD-10-CM

## 2023-02-10 DIAGNOSIS — Z85.810 PERSONAL HISTORY OF MALIGNANT NEOPLASM OF TONGUE: ICD-10-CM
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
## 2023-02-10 DIAGNOSIS — Z98.890 OTHER SPECIFIED POSTPROCEDURAL STATES: ICD-10-CM
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
## 2023-02-10 DIAGNOSIS — Z80.8 FAMILY HISTORY OF MALIGNANT NEOPLASM OF OTHER ORGANS OR SYSTEMS: ICD-10-CM

## 2023-02-10 DIAGNOSIS — E78.00 PURE HYPERCHOLESTEROLEMIA, UNSPECIFIED: ICD-10-CM

## 2023-02-10 DIAGNOSIS — E03.9 HYPOTHYROIDISM, UNSPECIFIED: ICD-10-CM

## 2023-02-10 DIAGNOSIS — Z92.89 PERSONAL HISTORY OF OTHER MEDICAL TREATMENT: ICD-10-CM

## 2023-02-10 DIAGNOSIS — Z79.890 HORMONE REPLACEMENT THERAPY: ICD-10-CM

## 2023-02-10 DIAGNOSIS — M27.2 INFLAMMATORY CONDITIONS OF JAWS: ICD-10-CM

## 2023-02-10 DIAGNOSIS — Z79.82 LONG TERM (CURRENT) USE OF ASPIRIN: ICD-10-CM

## 2023-02-10 DIAGNOSIS — Z80.3 FAMILY HISTORY OF MALIGNANT NEOPLASM OF BREAST: ICD-10-CM

## 2023-02-13 ENCOUNTER — APPOINTMENT (OUTPATIENT)
Dept: HYPERBARIC MEDICINE | Facility: HOSPITAL | Age: 68
End: 2023-02-13

## 2023-02-14 ENCOUNTER — APPOINTMENT (OUTPATIENT)
Dept: HYPERBARIC MEDICINE | Facility: HOSPITAL | Age: 68
End: 2023-02-14

## 2023-02-14 ENCOUNTER — OUTPATIENT (OUTPATIENT)
Dept: OUTPATIENT SERVICES | Facility: HOSPITAL | Age: 68
LOS: 1 days | End: 2023-02-14
Payer: MEDICARE

## 2023-02-14 DIAGNOSIS — Z98.890 OTHER SPECIFIED POSTPROCEDURAL STATES: Chronic | ICD-10-CM

## 2023-02-14 DIAGNOSIS — Z98.52 VASECTOMY STATUS: Chronic | ICD-10-CM

## 2023-02-14 DIAGNOSIS — Z92.89 PERSONAL HISTORY OF OTHER MEDICAL TREATMENT: Chronic | ICD-10-CM

## 2023-02-14 DIAGNOSIS — Z20.828 CONTACT WITH AND (SUSPECTED) EXPOSURE TO OTHER VIRAL COMMUNICABLE DISEASES: ICD-10-CM

## 2023-02-14 LAB — SARS-COV-2 RNA SPEC QL NAA+PROBE: SIGNIFICANT CHANGE UP

## 2023-02-14 PROCEDURE — U0005: CPT

## 2023-02-14 PROCEDURE — U0003: CPT

## 2023-02-15 ENCOUNTER — TRANSCRIPTION ENCOUNTER (OUTPATIENT)
Age: 68
End: 2023-02-15

## 2023-02-15 ENCOUNTER — APPOINTMENT (OUTPATIENT)
Dept: HYPERBARIC MEDICINE | Facility: HOSPITAL | Age: 68
End: 2023-02-15

## 2023-02-16 ENCOUNTER — TRANSCRIPTION ENCOUNTER (OUTPATIENT)
Age: 68
End: 2023-02-16

## 2023-02-16 ENCOUNTER — APPOINTMENT (OUTPATIENT)
Dept: HYPERBARIC MEDICINE | Facility: HOSPITAL | Age: 68
End: 2023-02-16

## 2023-02-16 ENCOUNTER — OUTPATIENT (OUTPATIENT)
Dept: OUTPATIENT SERVICES | Facility: HOSPITAL | Age: 68
LOS: 1 days | End: 2023-02-16
Payer: MEDICARE

## 2023-02-16 VITALS
HEIGHT: 66.5 IN | WEIGHT: 147.71 LBS | SYSTOLIC BLOOD PRESSURE: 124 MMHG | HEART RATE: 59 BPM | TEMPERATURE: 98 F | DIASTOLIC BLOOD PRESSURE: 66 MMHG | RESPIRATION RATE: 18 BRPM | OXYGEN SATURATION: 100 %

## 2023-02-16 VITALS
RESPIRATION RATE: 15 BRPM | OXYGEN SATURATION: 99 % | SYSTOLIC BLOOD PRESSURE: 124 MMHG | HEART RATE: 60 BPM | DIASTOLIC BLOOD PRESSURE: 63 MMHG

## 2023-02-16 DIAGNOSIS — K40.90 UNILATERAL INGUINAL HERNIA, WITHOUT OBSTRUCTION OR GANGRENE, NOT SPECIFIED AS RECURRENT: ICD-10-CM

## 2023-02-16 DIAGNOSIS — Z98.890 OTHER SPECIFIED POSTPROCEDURAL STATES: Chronic | ICD-10-CM

## 2023-02-16 DIAGNOSIS — Z92.89 PERSONAL HISTORY OF OTHER MEDICAL TREATMENT: Chronic | ICD-10-CM

## 2023-02-16 DIAGNOSIS — Z01.818 ENCOUNTER FOR OTHER PREPROCEDURAL EXAMINATION: ICD-10-CM

## 2023-02-16 DIAGNOSIS — Z98.52 VASECTOMY STATUS: Chronic | ICD-10-CM

## 2023-02-16 PROCEDURE — 88302 TISSUE EXAM BY PATHOLOGIST: CPT

## 2023-02-16 PROCEDURE — 49525 REPAIR ING HERNIA SLIDING: CPT | Mod: LT

## 2023-02-16 PROCEDURE — C1781: CPT

## 2023-02-16 PROCEDURE — 88302 TISSUE EXAM BY PATHOLOGIST: CPT | Mod: 26

## 2023-02-16 DEVICE — PLUG AND PATCH PHASIX 2X1.4IN XL: Type: IMPLANTABLE DEVICE | Site: LEFT | Status: FUNCTIONAL

## 2023-02-16 RX ORDER — OXYCODONE HYDROCHLORIDE 5 MG/1
1 TABLET ORAL
Qty: 20 | Refills: 0
Start: 2023-02-16

## 2023-02-16 RX ORDER — CEFAZOLIN SODIUM 1 G
2000 VIAL (EA) INJECTION ONCE
Refills: 0 | Status: COMPLETED | OUTPATIENT
Start: 2023-02-16 | End: 2023-02-16

## 2023-02-16 RX ORDER — HYDROMORPHONE HYDROCHLORIDE 2 MG/ML
0.5 INJECTION INTRAMUSCULAR; INTRAVENOUS; SUBCUTANEOUS
Refills: 0 | Status: DISCONTINUED | OUTPATIENT
Start: 2023-02-16 | End: 2023-02-16

## 2023-02-16 RX ORDER — ROSUVASTATIN CALCIUM 5 MG/1
1 TABLET ORAL
Qty: 0 | Refills: 0 | DISCHARGE

## 2023-02-16 RX ORDER — PENTOXIFYLLINE 400 MG
0 TABLET, EXTENDED RELEASE ORAL
Qty: 0 | Refills: 0 | DISCHARGE

## 2023-02-16 RX ORDER — SODIUM CHLORIDE 9 MG/ML
1000 INJECTION, SOLUTION INTRAVENOUS
Refills: 0 | Status: DISCONTINUED | OUTPATIENT
Start: 2023-02-16 | End: 2023-02-16

## 2023-02-16 RX ORDER — OXYCODONE AND ACETAMINOPHEN 5; 325 MG/1; MG/1
1 TABLET ORAL ONCE
Refills: 0 | Status: DISCONTINUED | OUTPATIENT
Start: 2023-02-16 | End: 2023-02-16

## 2023-02-16 RX ORDER — HYDROMORPHONE HYDROCHLORIDE 2 MG/ML
0.25 INJECTION INTRAMUSCULAR; INTRAVENOUS; SUBCUTANEOUS
Refills: 0 | Status: DISCONTINUED | OUTPATIENT
Start: 2023-02-16 | End: 2023-02-16

## 2023-02-16 RX ORDER — ONDANSETRON 8 MG/1
4 TABLET, FILM COATED ORAL ONCE
Refills: 0 | Status: DISCONTINUED | OUTPATIENT
Start: 2023-02-16 | End: 2023-02-16

## 2023-02-16 RX ADMIN — CHLORHEXIDINE GLUCONATE 1 APPLICATION(S): 213 SOLUTION TOPICAL at 09:43

## 2023-02-16 RX ADMIN — SODIUM CHLORIDE 75 MILLILITER(S): 9 INJECTION, SOLUTION INTRAVENOUS at 12:25

## 2023-02-16 NOTE — ASU DISCHARGE PLAN (ADULT/PEDIATRIC) - CARE PROVIDER_API CALL
Madison Manning (MD)  ColonRectal Surgery; Surgery  [address], [address]  [place] [address]  Phone: [phone]  Fax: [phone]  Follow Up Time: 2 weeks

## 2023-02-16 NOTE — BRIEF OPERATIVE NOTE - COMMENTS
GLORIA Bull PA-C provided direct first assist support to the surgeon during this surgical procedure. My involvement included positioning, prepping and draping the patient prior to surgery, ensuring clear visibility and exposure for the surgeon by using instruments such as retractors and suction, closing surgical incisions and dressing wounds. As well as other tasks as directed by the surgeon.

## 2023-02-16 NOTE — ASU DISCHARGE PLAN (ADULT/PEDIATRIC) - CALL YOUR DOCTOR IF YOU HAVE ANY OF THE FOLLOWING:
Bleeding that does not stop/Swelling that gets worse/Pain not relieved by Medications/Fever greater than (need to indicate Fahrenheit or Celsius)/Unable to urinate Bleeding that does not stop/Swelling that gets worse/Pain not relieved by Medications/Fever greater than (need to indicate Fahrenheit or Celsius)/Wound/Surgical Site with redness, or foul smelling discharge or pus/Unable to urinate

## 2023-02-16 NOTE — ASU DISCHARGE PLAN (ADULT/PEDIATRIC) - ASU DC SPECIAL INSTRUCTIONSFT
You just had Hernia surgery. Please follow the instructions below to make your recovery as comfortable as possible.    **REST! Apply ice packs to incision sites 20 mins on, 20 mins off every 4 hours for the first 24 hours.    If taking narcotic pain medications, you may take COLACE (OTC stool softener) as directed to prevent constipation.**    1. Depending on the procedure, you may or may not have pain. Please fill any prescriptions you have been given and take as directed.    2. Remove outer dressing (if any) in 24 hours before showering, leave white steri strips underneath in place.    3. You could experience minimal to mild blood staining of your dressing. If bleeding is persistent, but light, apply direct and gentle pressure to the area and lie flat in bed for 10-15 min. If bleeding is persistent and heavy or is associated with clots call the office immediately.    4. If you are unable to urinate after adequate hydration in 8-10 hours, call the office.    5. No lifting >20 lbs for 6-8 weeks    NOTE: Some swelling and or bruising  near or around the testicles can be expected and is considered normal. If you have any swelling or pain, you can apply ice packs to the area (15 minutes on/ 15 minutes off).   **Please call the office for an appointment when you get home ([phone]). If you have any questions, problems or concerns, do not hesitate to call the office.**

## 2023-02-16 NOTE — ASU DISCHARGE PLAN (ADULT/PEDIATRIC) - NS MD DC FALL RISK RISK
For information on Fall & Injury Prevention, visit: [email or web address] OR  [email or web address] OR  https://www.cdc.gov/steadi/patient.html

## 2023-02-17 ENCOUNTER — APPOINTMENT (OUTPATIENT)
Dept: HYPERBARIC MEDICINE | Facility: HOSPITAL | Age: 68
End: 2023-02-17

## 2023-03-07 ENCOUNTER — NON-APPOINTMENT (OUTPATIENT)
Age: 68
End: 2023-03-07

## 2023-03-11 ENCOUNTER — OUTPATIENT (OUTPATIENT)
Dept: OUTPATIENT SERVICES | Facility: HOSPITAL | Age: 68
LOS: 1 days | Discharge: HOME | End: 2023-03-11
Payer: MEDICARE

## 2023-03-11 ENCOUNTER — APPOINTMENT (OUTPATIENT)
Dept: WOUND CARE | Facility: HOSPITAL | Age: 68
End: 2023-03-11

## 2023-03-11 VITALS
RESPIRATION RATE: 19 BRPM | DIASTOLIC BLOOD PRESSURE: 66 MMHG | HEIGHT: 66 IN | TEMPERATURE: 98.1 F | BODY MASS INDEX: 24.43 KG/M2 | SYSTOLIC BLOOD PRESSURE: 110 MMHG | WEIGHT: 152 LBS | OXYGEN SATURATION: 97 % | HEART RATE: 75 BPM

## 2023-03-11 DIAGNOSIS — Z20.828 CONTACT WITH AND (SUSPECTED) EXPOSURE TO OTHER VIRAL COMMUNICABLE DISEASES: ICD-10-CM

## 2023-03-11 DIAGNOSIS — Z98.52 VASECTOMY STATUS: Chronic | ICD-10-CM

## 2023-03-11 DIAGNOSIS — Z98.890 OTHER SPECIFIED POSTPROCEDURAL STATES: Chronic | ICD-10-CM

## 2023-03-11 DIAGNOSIS — Z92.89 PERSONAL HISTORY OF OTHER MEDICAL TREATMENT: Chronic | ICD-10-CM

## 2023-03-11 DIAGNOSIS — Z28.39 OTHER UNDERIMMUNIZATION STATUS: ICD-10-CM

## 2023-03-11 LAB — SARS-COV-2 RNA SPEC QL NAA+PROBE: DETECTED

## 2023-03-11 PROCEDURE — G0463: CPT

## 2023-03-11 PROCEDURE — U0005: CPT

## 2023-03-11 PROCEDURE — U0003: CPT

## 2023-03-13 ENCOUNTER — NON-APPOINTMENT (OUTPATIENT)
Age: 68
End: 2023-03-13

## 2023-03-14 ENCOUNTER — APPOINTMENT (OUTPATIENT)
Dept: HYPERBARIC MEDICINE | Facility: HOSPITAL | Age: 68
End: 2023-03-14

## 2023-03-15 ENCOUNTER — APPOINTMENT (OUTPATIENT)
Dept: HYPERBARIC MEDICINE | Facility: HOSPITAL | Age: 68
End: 2023-03-15

## 2023-03-15 DIAGNOSIS — U07.1 COVID-19: ICD-10-CM

## 2023-03-16 ENCOUNTER — APPOINTMENT (OUTPATIENT)
Dept: HYPERBARIC MEDICINE | Facility: HOSPITAL | Age: 68
End: 2023-03-16

## 2023-03-17 ENCOUNTER — APPOINTMENT (OUTPATIENT)
Dept: HYPERBARIC MEDICINE | Facility: HOSPITAL | Age: 68
End: 2023-03-17

## 2023-03-18 ENCOUNTER — APPOINTMENT (OUTPATIENT)
Dept: HYPERBARIC MEDICINE | Facility: HOSPITAL | Age: 68
End: 2023-03-18

## 2023-03-20 ENCOUNTER — APPOINTMENT (OUTPATIENT)
Dept: HYPERBARIC MEDICINE | Facility: HOSPITAL | Age: 68
End: 2023-03-20

## 2023-03-21 ENCOUNTER — NON-APPOINTMENT (OUTPATIENT)
Age: 68
End: 2023-03-21

## 2023-03-21 ENCOUNTER — APPOINTMENT (OUTPATIENT)
Dept: HYPERBARIC MEDICINE | Facility: HOSPITAL | Age: 68
End: 2023-03-21

## 2023-03-22 ENCOUNTER — APPOINTMENT (OUTPATIENT)
Dept: HYPERBARIC MEDICINE | Facility: HOSPITAL | Age: 68
End: 2023-03-22

## 2023-03-23 ENCOUNTER — APPOINTMENT (OUTPATIENT)
Dept: HYPERBARIC MEDICINE | Facility: HOSPITAL | Age: 68
End: 2023-03-23

## 2023-03-24 ENCOUNTER — APPOINTMENT (OUTPATIENT)
Dept: HYPERBARIC MEDICINE | Facility: HOSPITAL | Age: 68
End: 2023-03-24

## 2023-03-27 ENCOUNTER — NON-APPOINTMENT (OUTPATIENT)
Age: 68
End: 2023-03-27

## 2023-03-27 ENCOUNTER — APPOINTMENT (OUTPATIENT)
Dept: HYPERBARIC MEDICINE | Facility: HOSPITAL | Age: 68
End: 2023-03-27

## 2023-03-28 ENCOUNTER — APPOINTMENT (OUTPATIENT)
Dept: HYPERBARIC MEDICINE | Facility: HOSPITAL | Age: 68
End: 2023-03-28

## 2023-03-29 ENCOUNTER — APPOINTMENT (OUTPATIENT)
Dept: HYPERBARIC MEDICINE | Facility: HOSPITAL | Age: 68
End: 2023-03-29

## 2023-03-30 ENCOUNTER — APPOINTMENT (OUTPATIENT)
Dept: HYPERBARIC MEDICINE | Facility: HOSPITAL | Age: 68
End: 2023-03-30

## 2023-03-31 ENCOUNTER — APPOINTMENT (OUTPATIENT)
Dept: HYPERBARIC MEDICINE | Facility: HOSPITAL | Age: 68
End: 2023-03-31

## 2023-05-16 NOTE — ASU DISCHARGE PLAN (ADULT/PEDIATRIC) - DRIVING
Infectious Disease Progress Note      Patient: Seven Brandon Date: 2023   : 1949 Attending: Lisa Cabrales MD   73 year old male        Subjective:   Patient is on room air.  No shortness of breath.  No fever or chills.  Still has some night sweats.  Still with pending labs.      Review of Systems:  Constitutional: Negative for chills and fever.   HENT: Negative for congestion, mouth sores and sore throat.    Eyes: Negative for visual disturbance.   Respiratory: Negative for cough and shortness of breath.    Cardiovascular: Negative for chest pain and palpitations.   Gastrointestinal: Negative for abdominal distention, abdominal pain, diarrhea, nausea and vomiting.   Genitourinary: Negative for difficulty urinating, dysuria, flank pain and frequency.   Musculoskeletal: Negative for back pain, joint swelling and myalgias.   Skin: Negative for rash.   Neurological: Negative for seizures, weakness and headaches.     Objective:  Vitals with min/max:      Vital Last Value 24 Hour Range   Temperature 97.2 °F (36.2 °C) (23 1546) Temp  Min: 97.2 °F (36.2 °C)  Max: 98.4 °F (36.9 °C)   Pulse 74 (23 1748) Pulse  Min: 69  Max: 82   Respiratory 18 (23 1546) Resp  Min: 16  Max: 18   Non-Invasive  Blood Pressure 116/69 (23 1546) BP  Min: 106/66  Max: 118/77   Pulse Oximetry 95 % (23 1748) SpO2  Min: 95 %  Max: 96 %   Arterial   Blood Pressure   No data recorded          Physical Exam  Constitutional:       Appearance: In no acute distress.  HENT:      Head: Normocephalic and atraumatic.   Eyes:      Conjunctiva/sclera: Conjunctivae normal.      Cardiovascular:      Rate and Rhythm: Normal rate and regular rhythm.      Heart sounds: Normal heart sounds. No murmur heard.   Pulmonary:      Effort: No respiratory distress.      Breath sounds: No wheezing.   Abdominal:      General: Bowel sounds are normal.      Palpations: Abdomen is soft. There is no mass.      Tenderness: There is  no abdominal tenderness.   Musculoskeletal:         General: No tenderness.      Cervical back: Neck supple.  S/p left knee revision, no evidence of cellulitis   Lymphadenopathy:      Cervical: No cervical adenopathy.   Skin:     General: Skin is warm.      Findings: No erythema or rash.     Neurological:      Mental Status: Alert and oriented to person, place, and time.      Cranial Nerves: No cranial nerve deficit.   Psychiatric:         Behavior: Calm and cooperative.      Laboratory Results:  Recent Labs   Lab 05/16/23  0545 05/15/23  0525 05/14/23  0954   WBC 5.9 8.9 9.6   HCT 32.9* 35.6* 36.4*   HGB 10.7* 11.4* 11.8*    344 372   SODIUM 138 137 133*   POTASSIUM 4.5 4.1 3.9   CHLORIDE 103 102 100   CO2 28 26 25   GLUCOSE 99 101* 119*   BUN 22* 18 17   CREATININE 0.77 0.74 0.70   RESR  --   --  49*       US VASC EXTREMITY LOWER VENOUS DUPLEX   Final Result   No evidence for left lower extremity DVT.          Electronically Signed by: OLIVIA DAY D.O.    Signed on: 5/15/2023 11:21 AM    Workstation ID: [id number]      CTA CHEST PULMONARY EMBOLISM   Final Result   Unremarkable CTA central pulmonary arteries, specifically   without evidence of central thromboembolism.  In comparison with previous   10/29/2018, new Miliary pattern micronodules bilaterally with single   dominant 7 mm and 4 mm subpleural nodules lateral segment both lower lobes   suspicious of miliary tuberculosis or fungal infection with hematogenous   metastasis not excluded.  No other significant interval change.      Electronically Signed by: LAURA YANG M.D.    Signed on: 5/14/2023 12:47 PM    Workstation ID: [id number]      XR CHEST PA OR AP 1 VIEW   Final Result   FINDINGS AND IMPRESSION:       Mild hypoventilatory changes or atelectasis in the right lung base.    Subtle developing infiltrate would be difficult to exclude in the   appropriate clinical context.  Lung fields are otherwise clear.  Heart size   and pulmonary  vasculature are within normal limits.       Electronically Signed by: HAZEL ESQUIVEL M.D.    Signed on: 5/14/2023 10:52 AM    Workstation ID: [id number]             Cultures:   Microbiology Results     None           Assessment:   1.  Bibasilar pneumonia, interstitial nodular pattern  Blood cultures no growth to date.  2.  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  GERD.    Plan:  -Continue ceftriaxone 2 g IV daily.  -Continue Azithromycin for now  -Await fungal serology and Quantiferon gold assay test.  -Await Legionella, Mycoplasma urine antigen test  -Monitor clinical progress.    -Maintain isolation as per protocol.  -Further recommendations to follow.    Plan as discussed with patient, primary RN, and attending physician Dr. Lima.    Magdalene White NP  5/16/2023 5:58 PM   No.../until seen by surgeon.

## 2023-09-14 NOTE — PATIENT PROFILE ADULT - HAVE YOU EXPERIENCED A TRAUMATIC EVENT?
Patient complains of sore throat, congestion, cough and woke up with eye drainage. States was up coughing all night. Fever yesterday.    no

## 2024-01-29 NOTE — PROCEDURE
Medications:  Continuous Infusions:   heparin in 0.9% NaCl 1 mL/hr (01/29/24 0600)    heparin in 0.9% NaCl 1 mL/hr (01/29/24 0600)     Scheduled Meds:   ceFEPime IV (PEDS and ADULTS)  50 mg/kg (Dosing Weight) Intravenous Q12H    cholecalciferol (vitamin D3)  400 Units Per NG tube Daily    cloNIDine  12 mcg Per NG tube Q6H    dexAMETHasone  1.2 mg Intravenous Q12H    erythromycin ethylsuccinate  5 mg/kg (Dosing Weight) Per NG tube QID (WM & HS)    esomeprazole magnesium  5 mg Oral Before breakfast    furosemide  5 mg Per NG tube Q12H    levalbuterol  0.315 mg Nebulization Q4H    methadone  0.2 mg Per G Tube Q12H    PHENobarbitaL  10 mg Per NG tube Q24H    sodium chloride 0.9%  10 mL Intravenous Q6H    sodium chloride 3%  4 mL Nebulization Q4H    vancomycin (VANCOCIN) IV (PEDS and ADULTS)  15 mg/kg (Dosing Weight) Intravenous Q8H     PRN Meds:acetaminophen, dexAMETHasone, glycerin (laxative) Soln (Pedia-Lax), levalbuterol, morphine, simethicone, Flushing PICC/Midline Protocol **AND** sodium chloride 0.9% **AND** sodium chloride 0.9%, Pharmacy to dose Vancomycin consult **AND** vancomycin - pharmacy to dose, white petrolatum     Review of patient's allergies indicates:  No Known Allergies    Objective:     Vital Signs (Most Recent):  Temp: 98.6 °F (37 °C) (01/29/24 0400)  Pulse: 140 (01/29/24 0706)  Resp: 40 (01/29/24 0706)  BP: (!) 78/42 (01/29/24 0600)  SpO2: (!) 100 % (01/29/24 0706) Vital Signs (24h Range):  Temp:  [98 °F (36.7 °C)-99.1 °F (37.3 °C)] 98.6 °F (37 °C)  Pulse:  [127-156] 140  Resp:  [30-78] 40  SpO2:  [86 %-100 %] 100 %  BP: ()/(42-83) 78/42       Intake/Output Summary (Last 24 hours) at 1/29/2024 0709  Last data filed at 1/29/2024 0638  Gross per 24 hour   Intake 583.8 ml   Output 588 ml   Net -4.2 ml            Physical Exam  Constitutional:       General: She is active.      Appearance: She is not toxic-appearing.   Abdominal:      General: Abdomen is flat. There is no distension.       Palpations: Abdomen is soft.      Comments: Gtube in place. CDI, minimal drainage    Musculoskeletal:         General: Normal range of motion.   Skin:     General: Skin is warm.   Neurological:      Mental Status: She is alert.            Significant Labs:  I have reviewed all pertinent lab results within the past 24 hours.    Significant Diagnostics:  I have reviewed all pertinent imaging results/findings within the past 24 hours.   [Outpatient] : Outpatient [Ambulatory] : Patient is ambulatory. [THIS CHAMBER HAS BEEN CLEANED / DISINFECTED] : This chamber has been cleaned / disinfected according to local and hospital policy and procedure prior to this treatment. [Patient demonstrated and verbalized proper technique for using air break mask] : Patient demonstrated and verbalized proper technique for using air break mask [Patient educated on the risks of SMOKING prior to HBOT with understanding] : Patient educated on the risks of SMOKING prior to HBOT with understanding [Patient educated on the risks of CONSUMING ALCOHOL prior to HBOT with understanding] : Patient educated on the risks of CONSUMING ALCOHOL prior to HBOT with understanding [100% Cotton] : 100% cotton [Empty all pockets] : empty all pockets [No hair oils, wigs, hairpieces, pins] : no hair oils, wigs, hairpieces, pins  [Pre tx medications] : pre tx medications  [No make-up, creams] : no make-up, creams  [No jewelry] : no jewelry  [No matches, cigarettes, lighters] : no matches, cigarettes, lighters  [Dentures removed] : dentures removed [Hearing aid removed] : hearing aid removed [Ground bracelet on pt's wrist] : ground bracelet on pt's wrist  [Contacts removed] : contacts removed  [Remove nail polish] : remove nail polish  [No reading material] : no reading material  [Bra, undergarments removed] : bra, undergarments removed  [No contraindicated dressings] : no contraindicated dressings [Ground Wire - VISUAL Verification - Intact/Free of Obstruction] : Ground Wire - VISUAL Verification - Intact/Free of Obstruction  [Ground Continuity - Verified < 1 ohm w/ Wrist Strap [place]] : Ground Continuity - Verified < 1 ohm w/ Wrist Strap [place] [Number: ___] : Number: [unfilled] [Diagnosis: ___] : Diagnosis: [unfilled] [____] : Post-Dive: Time - [unfilled] [___] : Post-Dive: Value - [unfilled] mg/dL [Clear all fields] : clear all fields [] : No [FreeTextEntry2] : 2.4 [FreeTextEntry4] : 100 mins  [FreeTextEntry6] : 8957 [[facility]Entry8] : 5786 [de-identified] : 1815 [de-identified] : 1819 [de-identified] : 1840 [de-identified] : 1857 [de-identified] : 1920 [de-identified] : 1930 [de-identified] : 120 Mins

## 2024-03-30 ENCOUNTER — NON-APPOINTMENT (OUTPATIENT)
Age: 69
End: 2024-03-30

## 2024-05-09 ENCOUNTER — NON-APPOINTMENT (OUTPATIENT)
Age: 69
End: 2024-05-09

## 2024-05-10 ENCOUNTER — APPOINTMENT (OUTPATIENT)
Dept: OTOLARYNGOLOGY | Facility: CLINIC | Age: 69
End: 2024-05-10
Payer: MEDICARE

## 2024-05-10 VITALS
OXYGEN SATURATION: 96 % | SYSTOLIC BLOOD PRESSURE: 93 MMHG | HEIGHT: 66 IN | BODY MASS INDEX: 24.11 KG/M2 | WEIGHT: 150 LBS | HEART RATE: 79 BPM | DIASTOLIC BLOOD PRESSURE: 67 MMHG

## 2024-05-10 DIAGNOSIS — M27.2 INFLAMMATORY CONDITIONS OF JAWS: ICD-10-CM

## 2024-05-10 DIAGNOSIS — Z85.810 PERSONAL HISTORY OF MALIGNANT NEOPLASM OF TONGUE: ICD-10-CM
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
## 2024-05-10 PROCEDURE — 31575 DIAGNOSTIC LARYNGOSCOPY: CPT

## 2024-05-10 PROCEDURE — 99212 OFFICE O/P EST SF 10 MIN: CPT | Mod: 25

## 2024-05-10 RX ORDER — EZETIMIBE 10 MG/1
10 TABLET ORAL
Refills: 0 | Status: ACTIVE | COMMUNITY

## 2024-05-10 RX ORDER — ASPIRIN 81 MG
81 TABLET,CHEWABLE ORAL
Refills: 0 | Status: ACTIVE | COMMUNITY

## 2024-05-10 RX ORDER — LEVOTHYROXINE SODIUM 0.05 MG/1
50 TABLET ORAL
Refills: 0 | Status: ACTIVE | COMMUNITY

## 2024-05-10 RX ORDER — LEVOTHYROXINE SODIUM 0.03 MG/1
25 TABLET ORAL
Refills: 0 | Status: COMPLETED | COMMUNITY
End: 2024-05-10

## 2024-05-10 NOTE — REASON FOR VISIT
[Initial Evaluation] : an initial evaluation for [FreeTextEntry2] : hx base of tongue cancer and dysphagia

## 2024-05-10 NOTE — CONSULT LETTER
[Dear  ___] : Dear  [unfilled], [Courtesy Letter:] : I had the pleasure of seeing your patient, [unfilled], in my office today. [Please see my note below.] : Please see my note below. [Consult Closing:] : Thank you very much for allowing me to participate in the care of this patient.  If you have any questions, please do not hesitate to contact me. [Sincerely,] : Sincerely, [FreeTextEntry2] : Dr Sotero Segundo and Dr Kulwant Meraz  [FreeTextEntry3] : \par  Dinh Wang MD, FACS\par  \par  Otolaryngology-Head and Neck Surgery\par  [employer] [facility]\par

## 2024-05-10 NOTE — HISTORY OF PRESENT ILLNESS
[de-identified] : 69 yro male with hx of base of tongue SCC dx 2010. Pt had 35 sessions of radiation, completed in 2010.  MRI 12/26/2020: IMPRESSION:  Limited evaluation of the oral cavity/oropharynx due to dental artifact. No aerodigestive abnormality identified. No cervical adenopathy.  PT is here for f/up.  Pt needs to drink fluids to enable swallowing.  Denies pain.  PT has full upper dentures.  Pt has altered speech.  PT had HBO for ORN of jaw.  PT lost 2/3 of lower teeth from radiation.    PT had cardiac stents placed on 5/6/24.  PT is on blood thinner; he cannot remember the name.   Complete review of systems which was performed during a previous encounter was reviewed with the patient and there are no changes except as stated in the HPI section.

## 2024-05-30 NOTE — ADDENDUM
[FreeTextEntry1] : PT ARRIVED A&OX3 \par ALL VITALS WITHIN PARAMETERS FOR HBOT WITH THE EXCEPTION OF BGL\par MD NOTIFIED, PT WAS GIVEN 16 GRAMS OF SUGAR VIA 2 JUICE, AS PER MD NO RETEST REQUIRED\par PT CLEARED TO BEGIN HBOT \par PT PRE-DIVE CHECKLIST SIGNED AND WITNESSED BY CHT\par The pt. was provided positional measure to support maximally upright posture prior to start of HBOT x CHT.\par The pt. expressed satisfaction with the placement of same.  \par PT DESCENDED TO 2.4 PEACE @ 2.2 PSI/MIN IN CHAMBER #1  WITHOUT INCIDENT\par PT RESTING AT TX DEPTH WITH VISIBLE CHEST RISE AND FALL OBSERVED CHAMBER SIDE\par Transfer of Observation from [facility] to [facility] @ 16:45.\par The pt. was observed with visible chest motion and without incident for the duration of observed HBOT.\par The pt. was administered intermittent med. air over course of HBOT without incident.\par The pt. ascended from tx. depth to surface without incident.\par The pt. tolerated HBOT without incident. \par Post-HBOT, the pt. questioned side effects of HBOT, specifically if blindness is a known or anticipated side effect. Pt. reports he was told this by a former patient and same was confirmed by another MD.\par Pt. was advised of ocular-related side effects r/t HBOT ; pt. was advised that blindness is not a known side effect of HBOT, but that staff would research same. 
Detail Level: Detailed
General Sunscreen Counseling: I recommended a broad spectrum sunscreen with a SPF of 30 or higher.  I explained that SPF 30 sunscreens block approximately 97 percent of the sun's harmful rays.  Sunscreens should be applied at least 15 minutes prior to expected sun exposure and then every 2 hours after that as long as sun exposure continues. If swimming or exercising sunscreen should be reapplied every 45 minutes to an hour after getting wet or sweating.  One ounce, or the equivalent of a shot glass full of sunscreen, is adequate to protect the skin not covered by a bathing suit. I also recommended a lip balm with a sunscreen as well. Sun protective clothing can be used in lieu of sunscreen but must be worn the entire time you are exposed to the sun's rays.

## 2024-07-30 ENCOUNTER — NON-APPOINTMENT (OUTPATIENT)
Age: 69
End: 2024-07-30

## 2025-01-28 ENCOUNTER — APPOINTMENT (OUTPATIENT)
Dept: OTOLARYNGOLOGY | Facility: CLINIC | Age: 70
End: 2025-01-28
Payer: MEDICARE

## 2025-01-28 ENCOUNTER — NON-APPOINTMENT (OUTPATIENT)
Age: 70
End: 2025-01-28

## 2025-01-28 VITALS
HEART RATE: 54 BPM | SYSTOLIC BLOOD PRESSURE: 122 MMHG | BODY MASS INDEX: 24.27 KG/M2 | DIASTOLIC BLOOD PRESSURE: 74 MMHG | HEIGHT: 66 IN | WEIGHT: 151 LBS
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
## 2025-01-28 DIAGNOSIS — H90.3 SENSORINEURAL HEARING LOSS, BILATERAL: ICD-10-CM

## 2025-01-28 DIAGNOSIS — H81.12 BENIGN PAROXYSMAL VERTIGO, LEFT EAR: ICD-10-CM

## 2025-01-28 DIAGNOSIS — Z85.810 PERSONAL HISTORY OF MALIGNANT NEOPLASM OF TONGUE: ICD-10-CM

## 2025-01-28 DIAGNOSIS — M27.2 INFLAMMATORY CONDITIONS OF JAWS: ICD-10-CM

## 2025-01-28 PROCEDURE — 92567 TYMPANOMETRY: CPT

## 2025-01-28 PROCEDURE — 92557 COMPREHENSIVE HEARING TEST: CPT

## 2025-01-28 PROCEDURE — 99214 OFFICE O/P EST MOD 30 MIN: CPT

## 2025-01-28 RX ORDER — PENTOXIFYLLINE 400 MG/1
400 TABLET, EXTENDED RELEASE ORAL
Refills: 0 | Status: ACTIVE | COMMUNITY

## 2025-01-28 RX ORDER — PRASUGREL 10 MG/1
10 TABLET, FILM COATED ORAL
Refills: 0 | Status: ACTIVE | COMMUNITY

## 2025-02-04 ENCOUNTER — NON-APPOINTMENT (OUTPATIENT)
Age: 70
End: 2025-02-04

## 2025-02-19 ENCOUNTER — APPOINTMENT (OUTPATIENT)
Dept: PHARMACY | Facility: CLINIC | Age: 70
End: 2025-02-19
Payer: MEDICARE

## 2025-02-19 PROCEDURE — V5010 ASSESSMENT FOR HEARING AID: CPT | Mod: NC

## 2025-03-19 ENCOUNTER — APPOINTMENT (OUTPATIENT)
Dept: PHARMACY | Facility: CLINIC | Age: 70
End: 2025-03-19

## 2025-04-01 ENCOUNTER — APPOINTMENT (OUTPATIENT)
Dept: OTOLARYNGOLOGY | Facility: CLINIC | Age: 70
End: 2025-04-01
Payer: MEDICARE

## 2025-04-01 VITALS
HEIGHT: 66 IN | WEIGHT: 148 LBS | HEART RATE: 54 BPM | SYSTOLIC BLOOD PRESSURE: 103 MMHG | DIASTOLIC BLOOD PRESSURE: 67 MMHG | BODY MASS INDEX: 23.78 KG/M2

## 2025-04-01 DIAGNOSIS — C01 MALIGNANT NEOPLASM OF BASE OF TONGUE: ICD-10-CM

## 2025-04-01 DIAGNOSIS — H69.90 UNSPECIFIED EUSTACHIAN TUBE DISORDER, UNSPECIFIED EAR: ICD-10-CM

## 2025-04-01 DIAGNOSIS — J34.89 OTHER SPECIFIED DISORDERS OF NOSE AND NASAL SINUSES: ICD-10-CM

## 2025-04-01 DIAGNOSIS — Z85.810 PERSONAL HISTORY OF MALIGNANT NEOPLASM OF TONGUE: ICD-10-CM

## 2025-04-01 DIAGNOSIS — H90.3 SENSORINEURAL HEARING LOSS, BILATERAL: ICD-10-CM

## 2025-04-01 PROCEDURE — 31575 DIAGNOSTIC LARYNGOSCOPY: CPT

## 2025-04-01 PROCEDURE — 99214 OFFICE O/P EST MOD 30 MIN: CPT | Mod: 25

## 2025-04-01 PROCEDURE — 92567 TYMPANOMETRY: CPT

## 2025-04-02 ENCOUNTER — APPOINTMENT (OUTPATIENT)
Dept: PHARMACY | Facility: CLINIC | Age: 70
End: 2025-04-02
Payer: SELF-PAY

## 2025-04-02 PROCEDURE — V5261A: CUSTOM

## 2025-04-03 ENCOUNTER — OUTPATIENT (OUTPATIENT)
Dept: OUTPATIENT SERVICES | Facility: HOSPITAL | Age: 70
LOS: 1 days | End: 2025-04-03
Payer: MEDICARE

## 2025-04-03 ENCOUNTER — APPOINTMENT (OUTPATIENT)
Dept: CT IMAGING | Facility: CLINIC | Age: 70
End: 2025-04-03
Payer: MEDICARE

## 2025-04-03 DIAGNOSIS — Z92.89 PERSONAL HISTORY OF OTHER MEDICAL TREATMENT: Chronic | ICD-10-CM

## 2025-04-03 DIAGNOSIS — Z98.890 OTHER SPECIFIED POSTPROCEDURAL STATES: Chronic | ICD-10-CM

## 2025-04-03 DIAGNOSIS — C01 MALIGNANT NEOPLASM OF BASE OF TONGUE: ICD-10-CM

## 2025-04-03 DIAGNOSIS — Z98.52 VASECTOMY STATUS: Chronic | ICD-10-CM

## 2025-04-03 PROCEDURE — 70491 CT SOFT TISSUE NECK W/DYE: CPT | Mod: 26

## 2025-04-03 PROCEDURE — 70491 CT SOFT TISSUE NECK W/DYE: CPT

## 2025-04-16 ENCOUNTER — APPOINTMENT (OUTPATIENT)
Dept: PHARMACY | Facility: CLINIC | Age: 70
End: 2025-04-16
Payer: SELF-PAY

## 2025-04-16 PROCEDURE — V5299A: CUSTOM

## 2025-05-07 ENCOUNTER — APPOINTMENT (OUTPATIENT)
Dept: PHARMACY | Facility: CLINIC | Age: 70
End: 2025-05-07
Payer: SELF-PAY

## 2025-05-07 PROCEDURE — V5299A: CUSTOM

## 2025-05-16 NOTE — ASU PREOP CHECKLIST - RESPIRATORY RATE (BREATHS/MIN)
----- Message from Mamta sent at 5/16/2025 11:02 AM CDT -----  Type:  Needs Medical AdviceWho Called: DAVID CHRISTIANSON [[id number]]Symptoms (please be specific):  How long has patient had these symptoms:  Pharmacy name and phone #:  Would the patient rather a call back or a response via [facility]? Best Call Back Number:  [phone] Information: Patient 10 min running late  
12

## 2025-06-25 ENCOUNTER — APPOINTMENT (OUTPATIENT)
Dept: PHARMACY | Facility: CLINIC | Age: 70
End: 2025-06-25

## 2025-07-23 ENCOUNTER — APPOINTMENT (OUTPATIENT)
Dept: PHARMACY | Facility: CLINIC | Age: 70
End: 2025-07-23
Payer: SELF-PAY

## 2025-07-23 ENCOUNTER — APPOINTMENT (OUTPATIENT)
Dept: PHARMACY | Facility: CLINIC | Age: 70
End: 2025-07-23

## 2025-07-23 PROCEDURE — V5299A: CUSTOM

## 2025-07-28 ENCOUNTER — INPATIENT (INPATIENT)
Facility: HOSPITAL | Age: 70
LOS: 0 days | Discharge: HOME | DRG: 864 | End: 2025-07-29
Attending: INTERNAL MEDICINE | Admitting: INTERNAL MEDICINE
Payer: MEDICARE

## 2025-07-28 VITALS — HEIGHT: 66 IN

## 2025-07-28 DIAGNOSIS — R50.9 FEVER, UNSPECIFIED: ICD-10-CM

## 2025-07-28 DIAGNOSIS — Z98.890 OTHER SPECIFIED POSTPROCEDURAL STATES: Chronic | ICD-10-CM

## 2025-07-28 DIAGNOSIS — Z92.89 PERSONAL HISTORY OF OTHER MEDICAL TREATMENT: Chronic | ICD-10-CM

## 2025-07-28 DIAGNOSIS — Z98.52 VASECTOMY STATUS: Chronic | ICD-10-CM

## 2025-07-28 LAB
ALBUMIN SERPL ELPH-MCNC: 3 G/DL — LOW (ref 3.3–5)
ALP SERPL-CCNC: 98 U/L — SIGNIFICANT CHANGE UP (ref 40–120)
ALT FLD-CCNC: 98 U/L — HIGH (ref 12–78)
ANION GAP SERPL CALC-SCNC: 8 MMOL/L — SIGNIFICANT CHANGE UP (ref 5–17)
APPEARANCE UR: CLEAR — SIGNIFICANT CHANGE UP
APTT BLD: 32.4 SEC — SIGNIFICANT CHANGE UP (ref 26.1–36.8)
AST SERPL-CCNC: 88 U/L — HIGH (ref 15–37)
BACTERIA # UR AUTO: NEGATIVE /HPF — SIGNIFICANT CHANGE UP
BASOPHILS # BLD AUTO: 0 K/UL — SIGNIFICANT CHANGE UP (ref 0–0.2)
BASOPHILS NFR BLD AUTO: 0 % — SIGNIFICANT CHANGE UP (ref 0–2)
BILIRUB SERPL-MCNC: 0.9 MG/DL — SIGNIFICANT CHANGE UP (ref 0.2–1.2)
BILIRUB UR-MCNC: ABNORMAL
BUN SERPL-MCNC: 17 MG/DL — SIGNIFICANT CHANGE UP (ref 7–23)
CALCIUM SERPL-MCNC: 8.6 MG/DL — SIGNIFICANT CHANGE UP (ref 8.5–10.1)
CAST: 2 /LPF — SIGNIFICANT CHANGE UP (ref 0–4)
CHLORIDE SERPL-SCNC: 104 MMOL/L — SIGNIFICANT CHANGE UP (ref 96–108)
CO2 SERPL-SCNC: 24 MMOL/L — SIGNIFICANT CHANGE UP (ref 22–31)
COLOR SPEC: SIGNIFICANT CHANGE UP
CREAT SERPL-MCNC: 0.98 MG/DL — SIGNIFICANT CHANGE UP (ref 0.5–1.3)
DIFF PNL FLD: NEGATIVE — SIGNIFICANT CHANGE UP
EGFR: 82 ML/MIN/1.73M2 — SIGNIFICANT CHANGE UP
EGFR: 82 ML/MIN/1.73M2 — SIGNIFICANT CHANGE UP
EOSINOPHIL # BLD AUTO: 0 K/UL — SIGNIFICANT CHANGE UP (ref 0–0.5)
EOSINOPHIL NFR BLD AUTO: 0 % — SIGNIFICANT CHANGE UP (ref 0–6)
FLUAV AG NPH QL: SIGNIFICANT CHANGE UP
FLUBV AG NPH QL: SIGNIFICANT CHANGE UP
GLUCOSE SERPL-MCNC: 194 MG/DL — HIGH (ref 70–99)
GLUCOSE UR QL: NEGATIVE MG/DL — SIGNIFICANT CHANGE UP
HCT VFR BLD CALC: 34 % — LOW (ref 39–50)
HGB BLD-MCNC: 11.5 G/DL — LOW (ref 13–17)
IMM GRANULOCYTES # BLD AUTO: 0.01 K/UL — SIGNIFICANT CHANGE UP (ref 0–0.07)
IMM GRANULOCYTES NFR BLD AUTO: 0.3 % — SIGNIFICANT CHANGE UP (ref 0–0.9)
IMMATURE PLATELET FRACTION #: 2.5 K/UL — LOW (ref 3.9–12.5)
IMMATURE PLATELET FRACTION %: 4.8 % — SIGNIFICANT CHANGE UP (ref 1.6–7.1)
INR BLD: 1.29 RATIO — HIGH (ref 0.85–1.16)
KETONES UR QL: ABNORMAL MG/DL
LACTATE SERPL-SCNC: 1.8 MMOL/L — SIGNIFICANT CHANGE UP (ref 0.7–2)
LEUKOCYTE ESTERASE UR-ACNC: ABNORMAL
LYMPHOCYTES # BLD AUTO: 0.6 K/UL — LOW (ref 1–3.3)
LYMPHOCYTES NFR BLD AUTO: 19.6 % — SIGNIFICANT CHANGE UP (ref 13–44)
Lab: SLIGHT
MAGNESIUM SERPL-MCNC: 2.2 MG/DL — SIGNIFICANT CHANGE UP (ref 1.6–2.6)
MANUAL SMEAR VERIFICATION: SIGNIFICANT CHANGE UP
MCHC RBC-ENTMCNC: 30 PG — SIGNIFICANT CHANGE UP (ref 27–34)
MCHC RBC-ENTMCNC: 33.8 G/DL — SIGNIFICANT CHANGE UP (ref 32–36)
MCV RBC AUTO: 88.8 FL — SIGNIFICANT CHANGE UP (ref 80–100)
MONOCYTES # BLD AUTO: 0.71 K/UL — SIGNIFICANT CHANGE UP (ref 0–0.9)
MONOCYTES NFR BLD AUTO: 23.2 % — HIGH (ref 2–14)
NEUTROPHILS # BLD AUTO: 1.74 K/UL — LOW (ref 1.8–7.4)
NEUTROPHILS NFR BLD AUTO: 56.9 % — SIGNIFICANT CHANGE UP (ref 43–77)
NITRITE UR-MCNC: NEGATIVE — SIGNIFICANT CHANGE UP
NRBC # BLD AUTO: 0 K/UL — SIGNIFICANT CHANGE UP (ref 0–0)
NRBC # FLD: 0 K/UL — SIGNIFICANT CHANGE UP (ref 0–0)
NRBC BLD AUTO-RTO: 0 /100 WBCS — SIGNIFICANT CHANGE UP (ref 0–0)
PARASITE BLD: SIGNIFICANT CHANGE UP
PH UR: 5.5 — SIGNIFICANT CHANGE UP (ref 5–8)
PHOSPHATE SERPL-MCNC: 2 MG/DL — LOW (ref 2.5–4.5)
PLAT MORPH BLD: NORMAL — SIGNIFICANT CHANGE UP
PLATELET # BLD AUTO: 52 K/UL — LOW (ref 150–400)
PLATELET COUNT - ESTIMATE: ABNORMAL
PMV BLD: 11.5 FL — SIGNIFICANT CHANGE UP (ref 7–13)
POTASSIUM SERPL-MCNC: 3.5 MMOL/L — SIGNIFICANT CHANGE UP (ref 3.5–5.3)
POTASSIUM SERPL-SCNC: 3.5 MMOL/L — SIGNIFICANT CHANGE UP (ref 3.5–5.3)
PROT SERPL-MCNC: 6.6 GM/DL — SIGNIFICANT CHANGE UP (ref 6–8.3)
PROT UR-MCNC: 100 MG/DL
PROTHROM AB SERPL-ACNC: 15.2 SEC — HIGH (ref 9.9–13.4)
RBC # BLD: 3.83 M/UL — LOW (ref 4.2–5.8)
RBC # FLD: 13.7 % — SIGNIFICANT CHANGE UP (ref 10.3–14.5)
RBC BLD AUTO: ABNORMAL
RBC CASTS # UR COMP ASSIST: 8 /HPF — HIGH (ref 0–4)
RSV RNA NPH QL NAA+NON-PROBE: SIGNIFICANT CHANGE UP
SARS-COV-2 RNA SPEC QL NAA+PROBE: SIGNIFICANT CHANGE UP
SODIUM SERPL-SCNC: 136 MMOL/L — SIGNIFICANT CHANGE UP (ref 135–145)
SOURCE RESPIRATORY: SIGNIFICANT CHANGE UP
SP GR SPEC: >1.03 — HIGH (ref 1–1.03)
SQUAMOUS # UR AUTO: 1 /HPF — SIGNIFICANT CHANGE UP (ref 0–5)
UROBILINOGEN FLD QL: 1 MG/DL — SIGNIFICANT CHANGE UP (ref 0.2–1)
WBC # BLD: 3.06 K/UL — LOW (ref 3.8–10.5)
WBC # FLD AUTO: 3.06 K/UL — LOW (ref 3.8–10.5)
WBC MORPHOLOGY: NORMAL — SIGNIFICANT CHANGE UP
WBC UR QL: 2 /HPF — SIGNIFICANT CHANGE UP (ref 0–5)

## 2025-07-28 PROCEDURE — 87468 ANAPLSMA PHGCYTOPHLM AMP PRB: CPT

## 2025-07-28 PROCEDURE — 80048 BASIC METABOLIC PNL TOTAL CA: CPT

## 2025-07-28 PROCEDURE — 93010 ELECTROCARDIOGRAM REPORT: CPT

## 2025-07-28 PROCEDURE — 99223 1ST HOSP IP/OBS HIGH 75: CPT | Mod: AI

## 2025-07-28 PROCEDURE — 80076 HEPATIC FUNCTION PANEL: CPT

## 2025-07-28 PROCEDURE — 36415 COLL VENOUS BLD VENIPUNCTURE: CPT

## 2025-07-28 PROCEDURE — 74177 CT ABD & PELVIS W/CONTRAST: CPT | Mod: 26

## 2025-07-28 PROCEDURE — 71045 X-RAY EXAM CHEST 1 VIEW: CPT | Mod: 26

## 2025-07-28 PROCEDURE — 87798 DETECT AGENT NOS DNA AMP: CPT

## 2025-07-28 PROCEDURE — 86618 LYME DISEASE ANTIBODY: CPT | Mod: 59

## 2025-07-28 PROCEDURE — 71260 CT THORAX DX C+: CPT | Mod: 26

## 2025-07-28 PROCEDURE — 99285 EMERGENCY DEPT VISIT HI MDM: CPT

## 2025-07-28 PROCEDURE — 85027 COMPLETE CBC AUTOMATED: CPT

## 2025-07-28 PROCEDURE — 83036 HEMOGLOBIN GLYCOSYLATED A1C: CPT

## 2025-07-28 PROCEDURE — 87484 EHRLICHA CHAFFEENSIS AMP PRB: CPT

## 2025-07-28 RX ORDER — MAGNESIUM, ALUMINUM HYDROXIDE 200-200 MG
30 TABLET,CHEWABLE ORAL EVERY 4 HOURS
Refills: 0 | Status: DISCONTINUED | OUTPATIENT
Start: 2025-07-28 | End: 2025-07-29

## 2025-07-28 RX ORDER — ROSUVASTATIN CALCIUM 20 MG/1
40 TABLET, FILM COATED ORAL AT BEDTIME
Refills: 0 | Status: DISCONTINUED | OUTPATIENT
Start: 2025-07-28 | End: 2025-07-29

## 2025-07-28 RX ORDER — LEVOTHYROXINE SODIUM 300 MCG
25 TABLET ORAL DAILY
Refills: 0 | Status: DISCONTINUED | OUTPATIENT
Start: 2025-07-28 | End: 2025-07-28

## 2025-07-28 RX ORDER — AZITHROMYCIN 250 MG
500 CAPSULE ORAL DAILY
Refills: 0 | Status: DISCONTINUED | OUTPATIENT
Start: 2025-07-29 | End: 2025-07-29

## 2025-07-28 RX ORDER — PIPERACILLIN-TAZO-DEXTROSE,ISO 3.375G/5
3.38 IV SOLUTION, PIGGYBACK PREMIX FROZEN(ML) INTRAVENOUS ONCE
Refills: 0 | Status: COMPLETED | OUTPATIENT
Start: 2025-07-28 | End: 2025-07-28

## 2025-07-28 RX ORDER — SODIUM CHLORIDE 9 G/1000ML
1000 INJECTION, SOLUTION INTRAVENOUS ONCE
Refills: 0 | Status: COMPLETED | OUTPATIENT
Start: 2025-07-28 | End: 2025-07-28

## 2025-07-28 RX ORDER — EZETIMIBE 10 MG/1
1 TABLET ORAL
Refills: 0 | DISCHARGE

## 2025-07-28 RX ORDER — ATOVAQUONE 750 MG/5ML
750 SUSPENSION ORAL EVERY 12 HOURS
Refills: 0 | Status: DISCONTINUED | OUTPATIENT
Start: 2025-07-28 | End: 2025-07-29

## 2025-07-28 RX ORDER — PRASUGREL HYDROCHLORIDE 10 MG/1
1 TABLET, COATED ORAL
Refills: 0 | DISCHARGE

## 2025-07-28 RX ORDER — LEVOTHYROXINE SODIUM 300 MCG
50 TABLET ORAL DAILY
Refills: 0 | Status: DISCONTINUED | OUTPATIENT
Start: 2025-07-28 | End: 2025-07-29

## 2025-07-28 RX ORDER — LEVOTHYROXINE SODIUM 300 MCG
1 TABLET ORAL
Refills: 0 | DISCHARGE

## 2025-07-28 RX ORDER — ROSUVASTATIN CALCIUM 20 MG/1
1 TABLET, FILM COATED ORAL
Refills: 0 | DISCHARGE

## 2025-07-28 RX ORDER — ONDANSETRON HCL/PF 4 MG/2 ML
4 VIAL (ML) INJECTION EVERY 8 HOURS
Refills: 0 | Status: DISCONTINUED | OUTPATIENT
Start: 2025-07-28 | End: 2025-07-29

## 2025-07-28 RX ORDER — MELATONIN 5 MG
3 TABLET ORAL AT BEDTIME
Refills: 0 | Status: DISCONTINUED | OUTPATIENT
Start: 2025-07-28 | End: 2025-07-29

## 2025-07-28 RX ORDER — ROSUVASTATIN CALCIUM 20 MG/1
20 TABLET, FILM COATED ORAL AT BEDTIME
Refills: 0 | Status: DISCONTINUED | OUTPATIENT
Start: 2025-07-28 | End: 2025-07-28

## 2025-07-28 RX ORDER — AZITHROMYCIN 250 MG
500 CAPSULE ORAL ONCE
Refills: 0 | Status: COMPLETED | OUTPATIENT
Start: 2025-07-28 | End: 2025-07-28

## 2025-07-28 RX ORDER — ACETAMINOPHEN 500 MG/5ML
650 LIQUID (ML) ORAL EVERY 6 HOURS
Refills: 0 | Status: DISCONTINUED | OUTPATIENT
Start: 2025-07-28 | End: 2025-07-29

## 2025-07-28 RX ADMIN — ATOVAQUONE 750 MILLIGRAM(S): 750 SUSPENSION ORAL at 11:47

## 2025-07-28 RX ADMIN — ROSUVASTATIN CALCIUM 40 MILLIGRAM(S): 20 TABLET, FILM COATED ORAL at 21:39

## 2025-07-28 RX ADMIN — SODIUM CHLORIDE 1000 MILLILITER(S): 9 INJECTION, SOLUTION INTRAVENOUS at 12:10

## 2025-07-28 RX ADMIN — Medication 650 MILLIGRAM(S): at 14:45

## 2025-07-28 RX ADMIN — Medication 250 MILLIGRAM(S): at 10:56

## 2025-07-28 RX ADMIN — Medication 200 GRAM(S): at 08:44

## 2025-07-28 RX ADMIN — Medication 1000 MILLILITER(S): at 08:44

## 2025-07-28 RX ADMIN — ATOVAQUONE 750 MILLIGRAM(S): 750 SUSPENSION ORAL at 21:40

## 2025-07-29 ENCOUNTER — TRANSCRIPTION ENCOUNTER (OUTPATIENT)
Age: 70
End: 2025-07-29

## 2025-07-29 VITALS
RESPIRATION RATE: 16 BRPM | HEART RATE: 80 BPM | SYSTOLIC BLOOD PRESSURE: 107 MMHG | OXYGEN SATURATION: 100 % | DIASTOLIC BLOOD PRESSURE: 59 MMHG | TEMPERATURE: 99 F

## 2025-07-29 LAB
A1C WITH ESTIMATED AVERAGE GLUCOSE RESULT: 6.2 % — HIGH (ref 4–5.6)
ALBUMIN SERPL ELPH-MCNC: 2.9 G/DL — LOW (ref 3.3–5)
ALP SERPL-CCNC: 89 U/L — SIGNIFICANT CHANGE UP (ref 40–120)
ALT FLD-CCNC: 114 U/L — HIGH (ref 12–78)
ANION GAP SERPL CALC-SCNC: 5 MMOL/L — SIGNIFICANT CHANGE UP (ref 5–17)
AST SERPL-CCNC: 98 U/L — HIGH (ref 15–37)
B BURGDOR C6 AB SER-ACNC: NEGATIVE — SIGNIFICANT CHANGE UP
B BURGDOR IGG+IGM SER QL IB: SIGNIFICANT CHANGE UP
B BURGDOR IGG+IGM SER-ACNC: 0.67 INDEX — SIGNIFICANT CHANGE UP (ref 0.01–0.9)
B MICROTI DNA BLD QL NAA+PROBE: DETECTED
BABESIA 18S RRNA BLD QL NAA+PROBE: DETECTED
BILIRUB DIRECT SERPL-MCNC: 0.4 MG/DL — HIGH (ref 0–0.3)
BILIRUB INDIRECT FLD-MCNC: 0.7 MG/DL — SIGNIFICANT CHANGE UP (ref 0.2–1)
BILIRUB SERPL-MCNC: 1.1 MG/DL — SIGNIFICANT CHANGE UP (ref 0.2–1.2)
BUN SERPL-MCNC: 11 MG/DL — SIGNIFICANT CHANGE UP (ref 7–23)
CALCIUM SERPL-MCNC: 8.7 MG/DL — SIGNIFICANT CHANGE UP (ref 8.5–10.1)
CHLORIDE SERPL-SCNC: 103 MMOL/L — SIGNIFICANT CHANGE UP (ref 96–108)
CO2 SERPL-SCNC: 29 MMOL/L — SIGNIFICANT CHANGE UP (ref 22–31)
CREAT SERPL-MCNC: 0.89 MG/DL — SIGNIFICANT CHANGE UP (ref 0.5–1.3)
CULTURE RESULTS: ABNORMAL
CULTURE RESULTS: SIGNIFICANT CHANGE UP
EGFR: 92 ML/MIN/1.73M2 — SIGNIFICANT CHANGE UP
EGFR: 92 ML/MIN/1.73M2 — SIGNIFICANT CHANGE UP
ESTIMATED AVERAGE GLUCOSE: 131 MG/DL — HIGH (ref 68–114)
GLUCOSE SERPL-MCNC: 115 MG/DL — HIGH (ref 70–99)
HCT VFR BLD CALC: 34.7 % — LOW (ref 39–50)
HGB BLD-MCNC: 11.5 G/DL — LOW (ref 13–17)
IMMATURE PLATELET FRACTION #: 3.3 K/UL — LOW (ref 3.9–12.5)
IMMATURE PLATELET FRACTION %: 4.9 % — SIGNIFICANT CHANGE UP (ref 1.6–7.1)
MCHC RBC-ENTMCNC: 30 PG — SIGNIFICANT CHANGE UP (ref 27–34)
MCHC RBC-ENTMCNC: 33.1 G/DL — SIGNIFICANT CHANGE UP (ref 32–36)
MCV RBC AUTO: 90.6 FL — SIGNIFICANT CHANGE UP (ref 80–100)
NRBC # BLD AUTO: 0 K/UL — SIGNIFICANT CHANGE UP (ref 0–0)
NRBC # FLD: 0 K/UL — SIGNIFICANT CHANGE UP (ref 0–0)
NRBC BLD AUTO-RTO: 0 /100 WBCS — SIGNIFICANT CHANGE UP (ref 0–0)
PLATELET # BLD AUTO: 68 K/UL — LOW (ref 150–400)
PMV BLD: 11.5 FL — SIGNIFICANT CHANGE UP (ref 7–13)
POTASSIUM SERPL-MCNC: 3.7 MMOL/L — SIGNIFICANT CHANGE UP (ref 3.5–5.3)
POTASSIUM SERPL-SCNC: 3.7 MMOL/L — SIGNIFICANT CHANGE UP (ref 3.5–5.3)
PROT SERPL-MCNC: 6.7 GM/DL — SIGNIFICANT CHANGE UP (ref 6–8.3)
RBC # BLD: 3.83 M/UL — LOW (ref 4.2–5.8)
RBC # FLD: 13.7 % — SIGNIFICANT CHANGE UP (ref 10.3–14.5)
SODIUM SERPL-SCNC: 137 MMOL/L — SIGNIFICANT CHANGE UP (ref 135–145)
SPECIMEN SOURCE: SIGNIFICANT CHANGE UP
SPECIMEN SOURCE: SIGNIFICANT CHANGE UP
WBC # BLD: 3.75 K/UL — LOW (ref 3.8–10.5)
WBC # FLD AUTO: 3.75 K/UL — LOW (ref 3.8–10.5)

## 2025-07-29 PROCEDURE — 99239 HOSP IP/OBS DSCHRG MGMT >30: CPT

## 2025-07-29 RX ORDER — AZITHROMYCIN 250 MG
1 CAPSULE ORAL
Qty: 8 | Refills: 0
Start: 2025-07-29 | End: 2025-08-05

## 2025-07-29 RX ORDER — ROSUVASTATIN CALCIUM 20 MG/1
1 TABLET, FILM COATED ORAL
Refills: 0 | DISCHARGE

## 2025-07-29 RX ORDER — ATOVAQUONE 750 MG/5ML
5 SUSPENSION ORAL
Qty: 80 | Refills: 0
Start: 2025-07-29 | End: 2025-08-05

## 2025-07-29 RX ADMIN — Medication 500 MILLIGRAM(S): at 09:37

## 2025-07-29 RX ADMIN — Medication 50 MICROGRAM(S): at 06:19

## 2025-07-29 RX ADMIN — ATOVAQUONE 750 MILLIGRAM(S): 750 SUSPENSION ORAL at 09:37

## 2025-08-01 LAB
A PHAGOCYTOPH DNA BLD QL NAA+PROBE: NEGATIVE — SIGNIFICANT CHANGE UP
E CHAFFEENSIS DNA BLD QL NAA+PROBE: NEGATIVE — SIGNIFICANT CHANGE UP
E EWINGII DNA SPEC QL NAA+PROBE: NEGATIVE — SIGNIFICANT CHANGE UP
EHRLICHIA DNA SPEC QL NAA+PROBE: NEGATIVE — SIGNIFICANT CHANGE UP

## 2025-08-02 LAB
CULTURE RESULTS: SIGNIFICANT CHANGE UP
CULTURE RESULTS: SIGNIFICANT CHANGE UP
SPECIMEN SOURCE: SIGNIFICANT CHANGE UP
SPECIMEN SOURCE: SIGNIFICANT CHANGE UP

## 2025-08-06 DIAGNOSIS — E03.9 HYPOTHYROIDISM, UNSPECIFIED: ICD-10-CM

## 2025-08-06 DIAGNOSIS — E78.5 HYPERLIPIDEMIA, UNSPECIFIED: ICD-10-CM

## 2025-08-06 DIAGNOSIS — B60.09: ICD-10-CM

## 2025-08-06 DIAGNOSIS — R73.9 HYPERGLYCEMIA, UNSPECIFIED: ICD-10-CM

## 2025-08-06 DIAGNOSIS — I25.10 ATHEROSCLEROTIC HEART DISEASE OF NATIVE CORONARY ARTERY WITHOUT ANGINA PECTORIS: ICD-10-CM

## 2025-08-06 DIAGNOSIS — Z79.890 HORMONE REPLACEMENT THERAPY: ICD-10-CM

## 2025-08-06 DIAGNOSIS — D61.818 OTHER PANCYTOPENIA: ICD-10-CM

## 2025-09-09 ENCOUNTER — NON-APPOINTMENT (OUTPATIENT)
Age: 70
End: 2025-09-09

## (undated) DEVICE — POSITIONER STRAP ARMBOARD VELCRO TS-30

## (undated) DEVICE — Device

## (undated) DEVICE — BASIN SET DOUBLE

## (undated) DEVICE — DRSG STERISTRIPS 0.5 X 4"

## (undated) DEVICE — ELCTR BOVIE TIP BLADE INSULATED 2.75" EDGE

## (undated) DEVICE — SUT PROLENE 0 30" CT-2

## (undated) DEVICE — PACK MINOR WITH LAP

## (undated) DEVICE — SUT MONOCRYL 4-0 27" PS-2 UNDYED

## (undated) DEVICE — SUT PROLENE 1 30" CT-1

## (undated) DEVICE — DRAIN PENROSE 0.5X12" SILICONE

## (undated) DEVICE — DRAPE LAPAROTOMY TRANSVERSE

## (undated) DEVICE — CANISTER DISPOSABLE THIN WALL 3000CC

## (undated) DEVICE — SOL IRR POUR NS 0.9% 500ML

## (undated) DEVICE — DRAPE 3/4 SHEET 52X76"

## (undated) DEVICE — SUT VICRYL 2-0 27" SH UNDYED

## (undated) DEVICE — SYR LUER LOK 10CC

## (undated) DEVICE — SUT PROLENE 2-0 30" CT-2

## (undated) DEVICE — SUT VICRYL 0 27" CT-2 UNDYED

## (undated) DEVICE — VENODYNE/SCD SLEEVE CALF MEDIUM

## (undated) DEVICE — SUT VICRYL 3-0 27" SH UNDYED

## (undated) DEVICE — ELCTR GROUNDING PAD ADULT COVIDIEN

## (undated) DEVICE — SOL IRR POUR H2O 500ML

## (undated) DEVICE — PREP BETADINE KIT

## (undated) DEVICE — DRAIN PENROSE .25" X 12" SILICONE

## (undated) DEVICE — SPONGE DISSECTOR PEANUT

## (undated) DEVICE — SUT VICRYL 2-0 18" TIES UNDYED